# Patient Record
Sex: MALE | Race: WHITE | NOT HISPANIC OR LATINO | Employment: OTHER | URBAN - NONMETROPOLITAN AREA
[De-identification: names, ages, dates, MRNs, and addresses within clinical notes are randomized per-mention and may not be internally consistent; named-entity substitution may affect disease eponyms.]

---

## 2017-01-25 ENCOUNTER — HISTORY (OUTPATIENT)
Dept: SURGERY | Facility: OTHER | Age: 72
End: 2017-01-25

## 2017-01-25 ENCOUNTER — TRANSFERRED RECORDS (OUTPATIENT)
Dept: MULTI SPECIALTY CLINIC | Facility: CLINIC | Age: 72
End: 2017-01-25

## 2017-01-25 ENCOUNTER — SURGERY (OUTPATIENT)
Dept: SURGERY | Facility: OTHER | Age: 72
End: 2017-01-25

## 2017-01-25 ENCOUNTER — HOSPITAL ENCOUNTER (OUTPATIENT)
Dept: SURGERY | Facility: OTHER | Age: 72
Discharge: HOME | End: 2017-01-25
Attending: SURGERY | Admitting: SURGERY

## 2017-01-26 ENCOUNTER — COMMUNICATION - GICH (OUTPATIENT)
Dept: SURGERY | Facility: OTHER | Age: 72
End: 2017-01-26

## 2017-01-26 ENCOUNTER — HISTORY (OUTPATIENT)
Dept: SURGERY | Facility: OTHER | Age: 72
End: 2017-01-26

## 2017-07-10 ENCOUNTER — HISTORY (OUTPATIENT)
Dept: FAMILY MEDICINE | Facility: OTHER | Age: 72
End: 2017-07-10

## 2017-07-10 ENCOUNTER — OFFICE VISIT - GICH (OUTPATIENT)
Dept: FAMILY MEDICINE | Facility: OTHER | Age: 72
End: 2017-07-10

## 2017-07-10 DIAGNOSIS — H18.891 OTHER SPECIFIED DISORDERS OF CORNEA, RIGHT EYE: ICD-10-CM

## 2017-08-26 ENCOUNTER — COMMUNICATION - GICH (OUTPATIENT)
Dept: INTERNAL MEDICINE | Facility: OTHER | Age: 72
End: 2017-08-26

## 2017-08-26 DIAGNOSIS — I10 ESSENTIAL (PRIMARY) HYPERTENSION: ICD-10-CM

## 2017-08-31 ENCOUNTER — COMMUNICATION - GICH (OUTPATIENT)
Dept: INTERNAL MEDICINE | Facility: OTHER | Age: 72
End: 2017-08-31

## 2017-09-12 ENCOUNTER — OFFICE VISIT - GICH (OUTPATIENT)
Dept: INTERNAL MEDICINE | Facility: OTHER | Age: 72
End: 2017-09-12

## 2017-09-12 ENCOUNTER — HISTORY (OUTPATIENT)
Dept: INTERNAL MEDICINE | Facility: OTHER | Age: 72
End: 2017-09-12

## 2017-09-12 DIAGNOSIS — E78.5 HYPERLIPIDEMIA: ICD-10-CM

## 2017-09-12 DIAGNOSIS — Z23 ENCOUNTER FOR IMMUNIZATION: ICD-10-CM

## 2017-09-12 DIAGNOSIS — J45.20 MILD INTERMITTENT ASTHMA, UNCOMPLICATED: ICD-10-CM

## 2017-09-12 DIAGNOSIS — K21.9 GASTRO-ESOPHAGEAL REFLUX DISEASE WITHOUT ESOPHAGITIS: ICD-10-CM

## 2017-09-12 DIAGNOSIS — N13.8 OTHER OBSTRUCTIVE AND REFLUX UROPATHY: ICD-10-CM

## 2017-09-12 DIAGNOSIS — I10 ESSENTIAL (PRIMARY) HYPERTENSION: ICD-10-CM

## 2017-09-12 DIAGNOSIS — Z99.89 DEPENDENCE ON OTHER ENABLING MACHINES AND DEVICES: ICD-10-CM

## 2017-09-12 DIAGNOSIS — N40.1 ENLARGED PROSTATE WITH LOWER URINARY TRACT SYMPTOMS (LUTS): ICD-10-CM

## 2017-09-12 DIAGNOSIS — G47.33 OBSTRUCTIVE SLEEP APNEA: ICD-10-CM

## 2017-09-12 ASSESSMENT — PATIENT HEALTH QUESTIONNAIRE - PHQ9: SUM OF ALL RESPONSES TO PHQ QUESTIONS 1-9: 0

## 2017-11-17 ENCOUNTER — AMBULATORY - GICH (OUTPATIENT)
Dept: SCHEDULING | Facility: OTHER | Age: 72
End: 2017-11-17

## 2017-11-28 ENCOUNTER — AMBULATORY - GICH (OUTPATIENT)
Dept: SCHEDULING | Facility: OTHER | Age: 72
End: 2017-11-28

## 2017-12-18 ENCOUNTER — AMBULATORY - GICH (OUTPATIENT)
Dept: SCHEDULING | Facility: OTHER | Age: 72
End: 2017-12-18

## 2017-12-26 ENCOUNTER — AMBULATORY - GICH (OUTPATIENT)
Dept: SCHEDULING | Facility: OTHER | Age: 72
End: 2017-12-26

## 2017-12-28 NOTE — TELEPHONE ENCOUNTER
Patient Information     Patient Name MRJared Clay [id number] Male 1945      Telephone Encounter by Dahiana Alexis LPN at 2017  8:41 AM     Author:  Dahiana Alexis LPN Service:  (none) Author Type:  NURS- Licensed Practical Nurse     Filed:  2017  8:41 AM Encounter Date:  2017 Status:  Signed     :  Dahiana Alexis LPN (NURS- Licensed Practical Nurse)            Left a message for the patient to call back.  Dahiana Alexis LPN......2017  8:41 AM

## 2017-12-28 NOTE — TELEPHONE ENCOUNTER
Patient Information     Patient Name Jared Sewell [id number] Male 1945      Telephone Encounter by Poornima Paz at 2017 10:31 AM     Author:  Poornima Paz Service:  (none) Author Type:  (none)     Filed:  2017 10:31 AM Encounter Date:  2017 Status:  Signed     :  Poornima Paz            The patient has an appointment today with Dr Agrawal. He can address this issue then.  Poornima Paz LPN..................2017   10:31 AM

## 2017-12-28 NOTE — TELEPHONE ENCOUNTER
Patient Information     Patient Name Jared Sewell [id number] Male 1945      Telephone Encounter by Unique Balbuena at 2017  3:55 PM     Author:  Unique Balbuena Service:  (none) Author Type:  (none)     Filed:  2017  3:58 PM Encounter Date:  2017 Status:  Signed     :  Unique Balbuena            Patient is leaving [facility] for more than 90 days and wants to see if he can get more than a 90 day supply of meds.

## 2017-12-28 NOTE — PROGRESS NOTES
Patient Information     Patient Name MRN Sex Jared Khanna [id number] Male 1945      Progress Notes by Dia Guerrero NP at 7/10/2017  2:30 PM     Author:  Dia Guerrero NP Service:  (none) Author Type:  PHYS- Nurse Practitioner     Filed:  7/10/2017  4:50 PM Encounter Date:  7/10/2017 Status:  Signed     :  Dia Guerrero NP (PHYS- Nurse Practitioner)            HPI:    Jared Parry is a 71 y.o. male who presents to clinic today for eye concern.   States he was mowing the grass and there was dust and debris that got into his right eye.  Denies any eye trauma or penetrating injury.  Right eye with irritated feeling.  Denies eye pain.   Mild light sensitivity.  Denies vision loss or blurriness.  Mild watering.   Washed/rinsed it out at home with water.        Nursing Notes:   Ful-Juju ordered by Dia Guerrero NP.  Medication administered per order in [facility]   Lot # [id number]  Exp.   NDC 07444-646-85  Patient tolerated well.  Jackelyn Vanegas LPN..................7/10/2017  3:29 PM    Sodium Chloride Inhalation Solution ordered by Dia Guerrero NP.  Medication administered per order in [facility]   Lot # [id number]  Exp.   NDC 5363-8363-87  Patient tolerated well.  Jackelyn Vanegas LPN..................7/10/2017  3:30 PM      Tetracaine Hydrochloride ordered by Dia Guerrero NP.  Medication administered per order in [facility]   Lot # [id number]  Exp. 2018  NDC 6551-7608-05  Patient tolerated well.  Jackelyn Vanegas LPN..................7/10/2017  3:31 PM        Past Medical History:     Diagnosis  Date     ADD (attention deficit disorder with hyperactivity)      Adenomatous colon polyp     Next colonoscopy due 2016      Asthma      BPH (benign prostatic hyperplasia)      Hyperlipidemia      Hypertension      Hypertension      Nephrolithiasis      DUNIA on CPAP      Past Surgical History:      Procedure  Laterality Date     APPENDECTOMY       COLONOSCOPY SCREENING       INGUINAL  "HERNIA REPAIR BILATERAL       ORIF left femur       SC COLONOSCOPY BIOPSY SINGLE OR MULTIPLE  2017    tubular adenoma- due in  if indicated       TURP       Social History      Substance Use Topics        Smoking status:  Former Smoker     Types: Cigarettes     Quit date: 1983     Smokeless tobacco:  Former User     Quit date: 1986     Alcohol use  No     Current Outpatient Prescriptions       Medication  Sig Dispense Refill     losartan-hydrochlorothiazide, 50-12.5 mg, (HYZAAR) 50-12.5 mg tablet Take 1 tablet by mouth once daily. 90 tablet 3     No current facility-administered medications for this visit.      Medications have been reviewed by me and are current to the best of my knowledge and ability.    Allergies      Allergen   Reactions     Cephalexin  Hives     Sodium Pentothal [Thiopental]  *Unknown - Childhood Rxn     Patient states \"almost  from Pentothal as child\"        Past medical history, past surgical history, current medications and allergies reviewed and accurate to the best of my knowledge.        ROS:  Refer to HPI    /64  Pulse 68  Temp 97.5  F (36.4  C) (Tympanic)   Resp 18  Ht 1.702 m (5' 7\")  Wt 73.2 kg (161 lb 6.4 oz)  BMI 25.28 kg/m2    EXAM:  General Appearance: Well appearing elderly male, appropriate appearance for age. No acute distress  Head: normocephalic, atraumatic  Eyes: Right palpebral and bulbar conjunctivae with mild erythema and irritation.  Left conjunctivae normal.  Corneas clear bilaterally.  No conjunctival hemorrhage, hypopyon, or hyphema bilaterally.  No drainage or crusting noted bilaterally.  Patient able to look at lights without apparent light sensitivity or squinting.  PERRLA bilaterally.  EOMs intact.  No foreign body appreciated of right eye or beneath eyelids.  Fluorescein stain of right eye reveals no appreciated corneal abrasions.  Bilateral eyelids without swelling or erythema.    Respiratory: Normal effort.    Cardiac: RRR " with no murmurs  Musculoskeletal:  Normal gait.  Equal movement of bilateral upper extremities.  Equal movement of bilateral lower extremities.    Dermatological: no rashes or lesions noted of exposed skin  Psychological: normal affect, alert and pleasant        ASSESSMENT/PLAN:    ICD-10-CM    1. Corneal irritation of right eye H18.891 erythromycin ophthalmic ointment 0.5%        No foreign body appreciated of right eye or beneath eyelids.  Right eye flushed repeatedly with sterile saline without foreign body appreciated.  Fluorescein stain of right eye reveals no appreciated corneal abrasions.   Erythromycin eye ointment QID for 2-3 days for corneal irritation  May use lubricating eye drops PRN  Follow up if symptoms persist or worsen or concerns            There are no Patient Instructions on file for this visit.

## 2017-12-28 NOTE — PROGRESS NOTES
Patient Information     Patient Name MRN Jared Warren [id number] Male 1945      Progress Notes by Stevie Agrawal MD at 2017  3:00 PM     Author:  Stevie Agrawal MD Service:  (none) Author Type:  Physician     Filed:  2017  3:50 PM Encounter Date:  2017 Status:  Signed     :  Stevie Agrawal MD (Physician)            SUBJECTIVE:    Jared Parry is a 71 y.o. male who presents for comprehensive review of their multiple medical problems and review of medications, renewal of medications and update on necessary health maintenance issues.      HPI Comments: He is here today for complete evaluation. He is feeling well in most respects. He says that everything is great except for the fact that he is having a little bit of discomfort in his calf. He would like to have that checked.    Last year he had a colonoscopy showing a tubular adenoma as well as a sessile serrated adenoma. Both of these were very small. It was recommended that he consider follow-up colonoscopy after 5 years.    The patient was recently in [place] and had a physical done through his union. He had lab work done there. He apparently had a stress test done at some point as well. Apparently he passed that without difficulty.    He does have treated hypertension and is on single drug therapy. He has no known end organ disease as a result of this. He has mild hyperlipidemia that is not treated. He has some mild lower urinary tract symptoms but nothing too severe. He does have sleep apnea and wears his CPAP every evening. He has some very mild reflux disease, he takes over-the-counter Zantac and that seems to control his symptoms.    I spent time today updating his past medical history, past surgical history, family history and social history. Medications were reconciled. He is due for Prevnar, otherwise everything else is up-to-date.      Allergies      Allergen   Reactions     Cephalexin  Hives     Sodium Pentothal  "[Thiopental]  *Unknown - Childhood Rxn     Patient states \"almost  from Pentothal as child\"    ,   Current Outpatient Prescriptions     Medication  Sig     losartan-hydrochlorothiazide, 50-12.5 mg, (HYZAAR) 50-12.5 mg tablet TAKE 1 TABLET BY MOUTH EVERY DAY     Omega-3-DHA-EPA-Fish Oil (FISH OIL) 1,000 mg (120 mg-180 mg) cap Take 1 capsule by mouth once daily.     ranitidine (ZANTAC) 75 mg tablet Take 1 tablet by mouth once daily.     No current facility-administered medications for this visit.      Medications have been reviewed by me and are current to the best of my knowledge and ability. ,   Past Medical History:     Diagnosis  Date     ADD (attention deficit disorder with hyperactivity)      Adenomatous colon polyp     Next colonoscopy due       Asthma      BPH (benign prostatic hyperplasia)      Hyperlipidemia      Hypertension      Nephrolithiasis      DUNIA on CPAP    ,   Patient Active Problem List       Diagnosis  Date Noted     Hypertension       DUNIA on CPAP       ACP (advance care planning)  2013     BPH (benign prostatic hypertrophy) with urinary obstruction  2012     NEPHROLITHIASIS       GERD       CORONARY ARTERY DISEASE, FAMILY HX       COLONIC POLYPS, ADENOMATOUS, HX OF       ADD       ASTHMA, INTERMITTENT       probable          HYPERLIPIDEMIA, MILD     ,   Past Surgical History:      Procedure  Laterality Date     APPENDECTOMY       INGUINAL HERNIA REPAIR BILATERAL       KIDNEY STONE SURGERY  2016     ORIF left femur       PA COLONOSCOPY BIOPSY SINGLE OR MULTIPLE  2017    tubular adenoma- due in  if indicated       TURP      and   Social History      Substance Use Topics        Smoking status:  Former Smoker     Types: Cigarettes     Quit date: 1983     Smokeless tobacco:  Former User     Quit date: 1986     Alcohol use  No     Family Status     Relation  Status     Father  at age 56    MI      Mother  at age 82    Old age      Brother Alive " "    Brother Alive     Brother Alive     Sister      Sister Alive     Social History     Social History        Marital status:       Spouse name: N/A     Number of children:  N/A     Years of education:  N/A     Social History Main Topics        Smoking status:  Former Smoker     Types: Cigarettes     Quit date: 1983     Smokeless tobacco:  Former User     Quit date: 1986     Alcohol use  No     Drug use:  No     Sexual activity:  Not Asked     Other Topics  Concern     None      Social History Narrative     Samantha Wife, ~ with two daughters from a previous marriage and five grandchildren.  Retired as a .  Lives with his wife on [address] in [place].                           REVIEW OF SYSTEMS:  Review of Systems   Musculoskeletal: Positive for myalgias.   All other systems reviewed and are negative.      OBJECTIVE:  /68  Pulse 76  Ht 1.727 m (5' 8\")  Wt 73.8 kg (162 lb 12.8 oz)  BMI 24.75 kg/m2    EXAM:   Physical Exam   Constitutional: He is oriented to person, place, and time and well-developed, well-nourished, and in no distress. No distress.   HENT:   Head: Normocephalic and atraumatic.   Right Ear: Tympanic membrane and external ear normal.   Left Ear: Tympanic membrane and external ear normal.   Nose: Nose normal.   Mouth/Throat: Oropharynx is clear and moist and mucous membranes are normal. No oropharyngeal exudate.   Eyes: Conjunctivae are normal. Pupils are equal, round, and reactive to light. No scleral icterus.   Neck: Normal range of motion. Neck supple. Normal carotid pulses, no hepatojugular reflux and no JVD present. Carotid bruit is not present. No tracheal deviation and no edema present. No thyroid mass and no thyromegaly present.   Cardiovascular: Normal rate, regular rhythm, normal heart sounds and intact distal pulses.  Exam reveals no gallop and no friction rub.    No murmur heard.  Pulmonary/Chest: Effort normal and " breath sounds normal. No respiratory distress. He has no decreased breath sounds. He has no wheezes. He has no rhonchi. He has no rales. He exhibits no tenderness.   Abdominal: Soft. Bowel sounds are normal. He exhibits no distension and no mass. There is no hepatosplenomegaly. There is no tenderness. There is no rebound and no guarding. Hernia confirmed negative in the right inguinal area and confirmed negative in the left inguinal area.   Genitourinary: Rectum normal, prostate normal, testes/scrotum normal and penis normal.   Musculoskeletal: Normal range of motion. He exhibits no edema or tenderness.   Subjective pain in left calf.  No palpable abnormalities   Lymphadenopathy:     He has no cervical adenopathy.   Neurological: He is alert and oriented to person, place, and time. He has normal motor skills. He displays normal reflexes. No cranial nerve deficit. He exhibits normal muscle tone. Gait normal. Coordination normal.   Skin: Skin is warm and dry. No rash noted. He is not diaphoretic. No cyanosis or erythema. No pallor. Nails show no clubbing.   Psychiatric: Mood, memory, affect and judgment normal.   Nursing note and vitals reviewed.      ASSESSMENT/PLAN:    ICD-10-CM    1. Mild intermittent asthma without complication J45.20 OMNI PREVNAR 13 (AKA PNEUMOCOCCAL VACCINE 13-VALENT IM)   2. Hyperlipidemia, unspecified hyperlipidemia type E78.5    3. DUNIA on CPAP G47.33      Z99.89    4. Hypertension I10 losartan-hydrochlorothiazide, 50-12.5 mg, (HYZAAR) 50-12.5 mg tablet   5. BPH (benign prostatic hypertrophy) with urinary obstruction N40.1      N13.8    6. Gastroesophageal reflux disease, esophagitis presence not specified K21.9    7. Encounter for immunization  Z23 OMNI PREVNAR 13 (AKA PNEUMOCOCCAL VACCINE 13-VALENT IM)        Plan: He really appears to be healthy at this point in time. Reassured regarding his calf discomfort, I really don't find anything there that I would be concerned about. He probably  just has a strain of that muscle. Return if this doesn't improve. Medications will remain the same. Prevnar given today. He will come in at some point and drop off his lab work that he had recently in [place], therefore lab was not done today. Assuming all goes well he will follow-up on an annual basis or anytime sooner if there are problems.

## 2017-12-28 NOTE — TELEPHONE ENCOUNTER
Patient Information     Patient Name MRJared Clay [id number] Male 1945      Telephone Encounter by Dahiana Alexis LPN at 2017 10:10 AM     Author:  Dahiana Alexis LPN Service:  (none) Author Type:  NURS- Licensed Practical Nurse     Filed:  2017 10:10 AM Encounter Date:  2017 Status:  Signed     :  Dahiana Alexis LPN (NURS- Licensed Practical Nurse)            Left a message for the patient to call back.  Dahiana Alexis LPN......2017  10:10 AM

## 2017-12-28 NOTE — TELEPHONE ENCOUNTER
Patient Information     Patient Name MRJared Clay [id number] Male 1945      Telephone Encounter by Dahiana Alexis LPN at 2017  3:45 PM     Author:  Dahiana Alexis LPN Service:  (none) Author Type:  NURS- Licensed Practical Nurse     Filed:  2017  3:45 PM Encounter Date:  2017 Status:  Signed     :  Dahiana Alexis LPN (NURS- Licensed Practical Nurse)            Left a message for the patient to call back.  Dahiana Alexis LPN......2017  3:45 PM

## 2017-12-28 NOTE — TELEPHONE ENCOUNTER
Patient Information     Patient Name MRN Jared Warren [id number] Male 1945      Telephone Encounter by Renetta Powers RN at 2017  4:38 PM     Author:  Renetta Powers RN Service:  (none) Author Type:  NURS- Registered Nurse     Filed:  2017  4:41 PM Encounter Date:  2017 Status:  Signed     :  Renetta Powers RN (NURS- Registered Nurse)            Diuretic Combinations    Office visit in the past 12 months or per provider note.    Last visit with DONNIE GARCIA was on: 2016 in [facility]  Next visit with DONNIE GARCIA is on: No future appointment listed with this provider  Next visit with Internal Medicine is on: No future appointment listed in this department    Lab test requirements:  Creatinine and Potassium annually, if ordering lab, order BMP.  CREATININE (mg/dL)    Date Value   2016 0.95     POTASSIUM (mmol/L)    Date Value   2016 4.2       Max refill for 12 months from last office visit or per provider note.  BP Readings from Last 4 Encounters:    07/10/17 112/64   17 121/85   16 118/70   08/10/16 156/82         Patient is due for medication management appointment. Limited refill provided at this time. [employer] message and/or letter sent for reminder to patient. Prescription refilled per RN Medication Refill Policy.................... Renetta Powers RN ....................  2017   4:40 PM

## 2017-12-28 NOTE — TELEPHONE ENCOUNTER
Patient Information     Patient Name Jared Sewell [id number] Male 1945      Telephone Encounter by Poornima Paz at 2017 10:08 AM     Author:  Poornima Paz Service:  (none) Author Type:  (none)     Filed:  2017 10:08 AM Encounter Date:  2017 Status:  Signed     :  Poornima Paz            Left a message to call back with his wife.  Poornima Paz LPN..................2017   10:08 AM

## 2017-12-30 NOTE — NURSING NOTE
Patient Information     Patient Name MRN Jared Warren [id number] Male 1945      Nursing Note by Jackelyn Vanegas at 7/10/2017  2:30 PM     Author:  Jackelyn Vanegas  Service:  (none) Author Type:  (none)     Filed:  7/10/2017  3:32 PM  Encounter Date:  7/10/2017 Status:  Addendum     :  Jackelyn Vanegas        Related Notes: Original Note by Jackelyn Vanegas filed at 7/10/2017  3:30 PM            Patient presents to clinic with right eye pain. He was mowing the grass and dust blew up and his eye became irritated.  Jackelyn FinneganN ....................  7/10/2017   2:51 PM    Ful-Juju ordered by Dia Guerrero NP.  Medication administered per order in [facility]   Lot # [id number]  Exp.   NDC 49106-922-23  Patient tolerated well.  Jackelyn Vanegas LPN..................7/10/2017  3:29 PM    Sodium Chloride Inhalation Solution ordered by Dia Guerrero NP.  Medication administered per order in [facility]   Lot # [id number]  Exp.   NDC 7366-7581-25  Patient tolerated well.  Jackelyn Vanegas LPN..................7/10/2017  3:30 PM      Tetracaine Hydrochloride ordered by Dia Guerrero NP.  Medication administered per order in [facility]   Lot # [id number]  Exp. 2018  NDC 1309-0851-03  Patient tolerated well.  Jackelyn Vanegas LPN..................7/10/2017  3:31 PM

## 2018-01-02 ENCOUNTER — AMBULATORY - GICH (OUTPATIENT)
Dept: SCHEDULING | Facility: OTHER | Age: 73
End: 2018-01-02

## 2018-01-03 NOTE — H&P
"Patient Information     Patient Name MRN Jared Warren [id number] Male 1945      H&P by Marianne Nguyễn MD at 2017  8:30 AM     Author:  Marianne Nguyễn MD Service:  (none) Author Type:  Physician     Filed:  2017  8:31 AM Date of Service:  2017  8:30 AM Status:  Signed     :  Marianne Nguyễn MD (Physician)            PRE-PROCEDURE NOTE    CHIEF COMPLAINT / REASON FOR PROCEDURE:  Need for screening colonoscopy.    PERTINENT HISTORY   Patient with no complaints. Previous colonoscopy -no polyps but had polyps in . No diarrhea, constipation, abdominal pain or rectal bleeding. No family history of colon polyps or colon cancer.    Past Medical History      Diagnosis   Date     ADD (attention deficit disorder with hyperactivity)       Adenomatous colon polyp       Next colonoscopy due 2016      Asthma       BPH (benign prostatic hyperplasia)       Hyperlipidemia       Hypertension       Hypertension       Nephrolithiasis       DUNIA on CPAP       Past Surgical History      Procedure  Laterality Date     Appendectomy       Orif left femur       Inguinal hernia repair bilateral       Colonoscopy screening       Turp       Other:  None  Bleeding tendencies:  No    Relevant Family History:  None    Relevant Social History:  None    A relevant review of systems was performed and was negative.    ALLERGIES/SENSITIVITIES:   Allergies      Allergen   Reactions     Cephalexin  Hives     Sodium Pentothal [Thiopental]  *Unknown - Childhood Rxn     Patient states \"almost  from Pentothal as child\"         CURRENT MEDICATIONS:    Current Facility-Administered Medications        Medication  Dose Route Frequency Last Rate     lactated ringers infusion  25 mL/hr Intravenous continuous       lidocaine (1%) injection 0.1-1 mL  0.1-1 mL Intra-Dermal one time prn       sodium chloride 0.9% 5 mL syringe (NORMAL SALINE)  5 mL Intravenous Each Time PRN        Prior to Admission medications        "   Medication Sig Start Date End Date Taking? Last Dose Authorizing Provider   losartan-hydrochlorothiazide, 50-12.5 mg, (HYZAAR) 50-12.5 mg tablet Take 1 tablet by mouth once daily. 8/31/16 1/25/2017 at Unknown time Stevie Agrawal MD       PRE-SEDATION ASSESSMENT:    Lung Exam:  Normal  Heart Exam:  Normal    Comment(s):      IMPRESSION:  Need for screening colonoscopy.    PLAN:  I discussed screening colonoscopy with the patient. Anesthesia coverage requested due to age more than 71..    Marianne Nguyễn MD

## 2018-01-03 NOTE — PROCEDURES
Patient Information     Patient Name MRN Jared Warren [id number] Male 1945      Procedures by Marianne Nguyễn MD at 2017  9:17 AM     Author:  Marianne Nguyễn MD Service:  (none) Author Type:  Physician     Filed:  2017  9:21 AM Date of Service:  2017  9:17 AM Status:  Signed     :  Marianne Nguyễn MD (Physician)        Pre-procedure Diagnoses:    1. Special screening for malignant neoplasms, colon [Z12.11]           Post-procedure Diagnoses:    1. Colon polyps [K63.5]    2. Diverticulosis of colon without diverticulitis [K57.30]           Procedures:    1. NH COLONOSCOPY BIOPSY SINGLE OR MULTIPLE [89809.0]               PROCEDURE NOTE    SURGEON: Marianne Nguyễn MD.    PRE-OP DIAGNOSIS:  Screening Colonoscopy      POST-OP DIAGNOSIS: colon polyps transverse and sigmoid colon, diverticula of colon    PROCEDURE:  Colonoscopy with polypectomies-cold forceps    ESTIMATED BLOOD LOSS: none    COMPLICATIONS:  None    SPECIMEN:  Transverse and sigmoid polyps    ANESTHESIA:  See anesthesia note, anesthesia requested due to:age more than 71    INDICATION FOR THE PROCEDURE: The patient is a 71 y.o. male. The patient has no complaints. I explained to the patient the risks, benefits and alternatives to screening colonoscopy for evaluating the colon for colon polyps and colon cancer. We specifically discussed the risks of bleeding, infection, perforation, potential inability to reach the cecum and the risks of sedation. The patient's questions were answered and the patient wished to proceed. Informed consent paperwork was completed.    PROCEDURE: The patient was taken to the endoscopy suite. Appropriate monitors were attached. The patient was placed in the left lateral decubitus position.Timeout was performed confirming the patient's identity and procedure to be performed.  After appropriate sedation was confirmed, digital rectal exam was performed.  There was normal tone and no gross  abnormality was noted. The lubricated colonoscope was introduced into the anus the colon was insufflated with air. The prep quality was adequate. Under direct visualization the scope was advanced to the cecum. The terminal ileum was inspected. No gross abnormality was noted. The scope was withdrawn back into the cecum. The mucosa of colon was inspected while withdrawing the scope. A small sessile polyp was noted in the transverse colon and removed with cold forceps. A tiny sessile polyp was noted in the distal sigmoid colon and removed with cold forceps. The scope was retroflexed in the rectum and the anorectal junction was inspected. No abnormalities were noted other than internal hemorrhoids. The scope was returned to a neutral position and the colon was decompressed. The scope was removed. The patient tolerated the procedure with no immediately apparent complication. The patient was taken to recovery in stable condition.    FOLLOW UP:  RECOMMEND high fiber diet, follow up: will call with pathology results.    Marianne Nguyễn MD

## 2018-01-03 NOTE — OR ANESTHESIA
Patient Information     Patient Name MRN Sex     Jared Parry [id number] Male 1945      OR Anesthesia by Manolo Bajwa CRNA at 2017 10:01 AM     Author:  Manolo Bajwa CRNA Service:  (none) Author Type:  NURS- Nurse Anesthetist     Filed:  2017 10:01 AM Date of Service:  2017 10:01 AM Status:  Signed     :  Manolo Bajwa CRNA (NURS- Nurse Anesthetist)            Anesthesia Post Operative Care Note    Name: Jared Parry  MRN:   [id number]  :    1945       Procedure Done:  See Surgeon Note   Case Cancelled for Anesthetic Reason:  No      Anesthesia Technique    Anesthetic Type:  MAC       MAC Type:  NC     Oral Trauma:  No    Intraoperative Course   Hemodynamics:  Stable    Ventilation Normal:  Yes Lung Sounds:  Normal      PACU Course        Nondepolarizer Used:       Reversed: N/A   Hemodynamics:  Stable      Hydration: Euvolemic   Temperature:  36.1 - 38.3      Mental Status:  Awake, alert, follows commands   Pain Management:  Adequate   Regional Block:  No   Anesthesia Complications:  None      Vital Signs:  Temp: 97.1  F (36.2  C)  Pulse: 67  BP: 121/85  Resp: 14  SpO2: 98 %    O2 Device: Room Air                  Active Lines:  Patient Lines/Drains/Airways Status    Active Line     Name: Placement date: Placement time: Site: Days:    PERIPHERAL VAD Right Hand 17   Hand   less than 1                Intake & Output:  Date  17 07 - 17 0659(Not Admitted)    17 07 - 17 0659      Shift  0549-4737 6633-5573 9274-1013 24 Hour Total 5296-6931 1480-5502 6225-3026 24 Hour Total   I  N  T  A  K  E   Shift Total           O  U  T  P  U  T   Other     200   200       +I/O+    Liquid Stool     200   200    Shift Total     200   200   NET      -200   -200   Weight (kg)  73.5 73.5 73.5 73.5 73.5 73.5 73.5 73.5         Labs:  No results for input(s): UB2NZVKOFMJ, HCK5BYMEPSGW, PHARTERIAL, BSZ0GCKUMUUL, H4OKJGOZITAJ in the  last 24 hours.    No results for input(s): MAGNESIUM in the last 24 hours.    No results for input(s): GLUCOSEMETER in the last 720 hours.        Manolo Bajwa CRNA ....................  1/25/2017   10:01 AM

## 2018-01-03 NOTE — OR NURSING
Patient Information     Patient Name MRN Jared Warren [id number] Male 1945      OR Nursing by Arlyn Ch RN at 2017  8:37 AM     Author:  Arlyn Ch RN Service:  (none) Author Type:  NURS- Registered Nurse     Filed:  2017  8:37 AM Date of Service:  2017  8:37 AM Status:  Signed     :  Arlyn Ch RN (NURS- Registered Nurse)            Received preop report from Patricia Stephens RN.

## 2018-01-03 NOTE — OR ANESTHESIA
"Patient Information     Patient Name MRN Sex Jared Khanna [id number] Male 1945      OR Anesthesia by Manolo Bajwa CRNA at 2017  8:21 AM     Author:  Manolo Bajwa CRNA Service:  (none) Author Type:  NURS- Nurse Anesthetist     Filed:  2017  8:21 AM Date of Service:  2017  8:21 AM Status:  Signed     :  Manolo Bajwa CRNA (NURS- Nurse Anesthetist)            ANESTHESIAPREOP      PREANESTHETIC EXAM    Jared Parry is a 71 y.o. male    Visit Vitals       /78     Pulse 58     Temp 97.4  F (36.3  C)     Resp 14     Ht 1.727 m (5' 8\")     Wt 73.5 kg (162 lb)     SpO2 97%     BMI 24.63 kg/m2     Body mass index is 24.63 kg/(m^2).    ALLERGIES    Cephalexin and Sodium pentothal [thiopental]      PAST MEDICAL HISTORY    Past Medical History      Diagnosis   Date     ADD (attention deficit disorder with hyperactivity)       Adenomatous colon polyp       Next colonoscopy due 2016      Asthma       BPH (benign prostatic hyperplasia)       Hyperlipidemia       Hypertension       Hypertension       Nephrolithiasis       DUNIA on CPAP         Patient Active Problem List     Diagnosis  Code     GERD K21.9     CORONARY ARTERY DISEASE, FAMILY HX Z82.49     COLONIC POLYPS, ADENOMATOUS, HX OF Z86.010     ADD F98.8     ASTHMA, INTERMITTENT J45.909     HYPERLIPIDEMIA, MILD E78.5     NEPHROLITHIASIS N20.0     BPH (benign prostatic hypertrophy) with urinary obstruction N40.1, N13.8     ACP (advance care planning) Z71.89     DUNIA on CPAP G47.33     Hypertension I10     Special screening for malignant neoplasms, colon Z12.11       Family History       Problem   Relation Age of Onset     Diabetes  Mother       at age 82       Heart Disease  Mother      \"enlarged heart.\"        Heart Disease  Father       at age 56 of an MI       Hypertension  Father      Hyperlipidemia  Father      hyperlipidemia       Alcohol/Drug  Father      alcohol use       Hypertension  " Brother      Heart Disease  Brother      CAD starting in his late 50s       Hyperlipidemia  Brother      hyperlipidemia       Other  Brother      obesity       Diabetes  Brother      type 2       Hypertension  Brother      Heart Disease  Brother      CAD starting in his late 50s       Hyperlipidemia  Brother      Other  Brother      obesity       Diabetes  Brother      type 2       Hypertension  Brother      Heart Disease  Brother      Other  Sister       in her mid 60's.  Viral cardiomyopathy,       Diabetes  Sister      Arthritis  Sister      Hypertension  Sister      Hyperlipidemia  Sister       possible hyperlipidemia         Past Surgical History      Procedure  Laterality Date     Appendectomy       Orif left femur       Inguinal hernia repair bilateral       Colonoscopy screening       Turp         Major Anesthetic Reactions: none    PMH/PSH Reviewed      History     Smoking Status       Former Smoker      Types: Cigarettes     Quit date: 1983   Smokeless Tobacco       Former User      Quit date: 1986     History    Alcohol Use No       Medications have been reviewed in coordination with proposed intra-procedure medications.    Prescriptions Prior to Admission       Medication  Sig Dispense Refill     losartan-hydrochlorothiazide, 50-12.5 mg, (HYZAAR) 50-12.5 mg tablet Take 1 tablet by mouth once daily. 90 tablet 3       Recent Labs  No results found for this visit on 17.    NPO Status Noted:  Yes    Airway Class:  2 Short TMD    ASA Physical Status: 2    ANESTHETIC PLAN    Anesthetic Plan: Mac    The risks, benefits, and alternatives of the procedure were discussed.    Postop Note: Please see the chart for the postop note.    Manolo Bajwa CRNA ....................  2017   8:21 AM

## 2018-01-03 NOTE — OR POSTOP
Patient Information     Patient Name MRN Sex Jared Khanna [id number] Male 1945      OR PostOp by Carolyn Lomeli RN at 2017 10:09 AM     Author:  Carolyn Lomeli RN Service:  (none) Author Type:  NURS- Registered Nurse     Filed:  2017 10:09 AM Date of Service:  2017 10:09 AM Status:  Signed     :  Carolyn Lomeli RN (NURS- Registered Nurse)            Discharge Note    Data:  Jared Parry has been discharged home at 1002 via ambulatory accompanied by Registered Nurse.      Action:  Written discharge/follow-up instructions were provided to patient and Spouse. Prescriptions : None.  Belongings sent with patient. Medications from home sent with patient/family: Not Applicable  Equipment none .     Response:  Patient and Spouse verbalized understanding of discharge instructions, reason for discharge, and necessary follow-up appointments.

## 2018-01-10 ENCOUNTER — OFFICE VISIT - GICH (OUTPATIENT)
Dept: INTERNAL MEDICINE | Facility: OTHER | Age: 73
End: 2018-01-10

## 2018-01-10 ENCOUNTER — HISTORY (OUTPATIENT)
Dept: INTERNAL MEDICINE | Facility: OTHER | Age: 73
End: 2018-01-10

## 2018-01-10 DIAGNOSIS — E78.5 HYPERLIPIDEMIA: ICD-10-CM

## 2018-01-10 DIAGNOSIS — I74.3 EMBOLISM AND THROMBOSIS OF ARTERY OF LOWER EXTREMITY (H): ICD-10-CM

## 2018-01-10 DIAGNOSIS — I10 ESSENTIAL (PRIMARY) HYPERTENSION: ICD-10-CM

## 2018-01-10 ASSESSMENT — PATIENT HEALTH QUESTIONNAIRE - PHQ9: SUM OF ALL RESPONSES TO PHQ QUESTIONS 1-9: 0

## 2018-01-25 ENCOUNTER — DOCUMENTATION ONLY (OUTPATIENT)
Dept: FAMILY MEDICINE | Facility: OTHER | Age: 73
End: 2018-01-25

## 2018-01-25 PROBLEM — I74.3: Status: ACTIVE | Noted: 2017-01-01

## 2018-01-25 PROBLEM — E78.5 HYPERLIPIDEMIA, MILD: Status: ACTIVE | Noted: 2018-01-25

## 2018-01-25 PROBLEM — F98.8 ATTENTION DEFICIT DISORDER: Status: ACTIVE | Noted: 2018-01-25

## 2018-01-25 PROBLEM — G47.33 OSA ON CPAP: Status: ACTIVE | Noted: 2018-01-25

## 2018-01-25 PROBLEM — I10 HYPERTENSION: Status: ACTIVE | Noted: 2018-01-25

## 2018-01-25 PROBLEM — J45.909 ASTHMA: Status: ACTIVE | Noted: 2018-01-25

## 2018-01-25 PROBLEM — Z86.0100 HISTORY OF COLONIC POLYPS: Status: ACTIVE | Noted: 2018-01-25

## 2018-01-25 PROBLEM — K21.9 ESOPHAGEAL REFLUX: Status: ACTIVE | Noted: 2018-01-25

## 2018-01-25 PROBLEM — N20.0 NEPHROLITHIASIS: Status: ACTIVE | Noted: 2018-01-25

## 2018-01-25 PROBLEM — Z82.49 FAMILY HISTORY OF ISCHEMIC HEART DISEASE: Status: ACTIVE | Noted: 2018-01-25

## 2018-01-25 RX ORDER — LOSARTAN POTASSIUM AND HYDROCHLOROTHIAZIDE 12.5; 5 MG/1; MG/1
1 TABLET ORAL DAILY
COMMUNITY
Start: 2017-09-12 | End: 2018-08-20

## 2018-01-25 RX ORDER — FERROUS SULFATE 325(65) MG
1 TABLET, DELAYED RELEASE (ENTERIC COATED) ORAL DAILY
COMMUNITY
Start: 2018-01-10 | End: 2018-05-29

## 2018-01-25 RX ORDER — ATORVASTATIN CALCIUM 20 MG/1
1 TABLET, FILM COATED ORAL DAILY
COMMUNITY
Start: 2018-01-10 | End: 2019-06-26

## 2018-01-28 VITALS
DIASTOLIC BLOOD PRESSURE: 68 MMHG | BODY MASS INDEX: 24.67 KG/M2 | WEIGHT: 162.8 LBS | HEIGHT: 68 IN | HEART RATE: 76 BPM | SYSTOLIC BLOOD PRESSURE: 102 MMHG

## 2018-01-28 VITALS
SYSTOLIC BLOOD PRESSURE: 112 MMHG | DIASTOLIC BLOOD PRESSURE: 64 MMHG | WEIGHT: 161.4 LBS | RESPIRATION RATE: 18 BRPM | BODY MASS INDEX: 25.33 KG/M2 | TEMPERATURE: 97.5 F | HEART RATE: 68 BPM | HEIGHT: 67 IN

## 2018-02-03 ASSESSMENT — PATIENT HEALTH QUESTIONNAIRE - PHQ9: SUM OF ALL RESPONSES TO PHQ QUESTIONS 1-9: 0

## 2018-02-09 VITALS
WEIGHT: 167.6 LBS | BODY MASS INDEX: 25.48 KG/M2 | DIASTOLIC BLOOD PRESSURE: 78 MMHG | SYSTOLIC BLOOD PRESSURE: 122 MMHG | HEART RATE: 76 BPM

## 2018-02-10 ASSESSMENT — PATIENT HEALTH QUESTIONNAIRE - PHQ9: SUM OF ALL RESPONSES TO PHQ QUESTIONS 1-9: 0

## 2018-02-12 NOTE — PROGRESS NOTES
Patient Information     Patient Name MRN Jared Warren [id number] Male 1945      Progress Notes by Stevie Agrawal MD at 1/10/2018  3:20 PM     Author:  Stevie Agrawal MD Service:  (none) Author Type:  Physician     Filed:  1/10/2018  3:47 PM Encounter Date:  1/10/2018 Status:  Signed     :  Stevie Agrawal MD (Physician)            SUBJECTIVE:    Jared Parry is a 72 y.o. male who presents for f/u on medical issues.    HPI Comments: He is here for follow-up. He had a popliteal embolectomy done in [place]. The source of this was unclear but it was felt to be possibly cardiac. He was placed on Xarelto which she is currently on and tolerates without difficulty. For secondary prevention, they recommended an antiplatelet. He does not tolerate aspirin or Plavix because of GI upset so he is no longer on either of these. He was started on moderate intensity statin therapy and is taking atorvastatin 20 mg daily. His cholesterol last fall in [place] was 220, most recently it was 160 in [place]. He is tolerating this without difficulty.    They did fairly extensive evaluation on him while he was there so nothing else really needs to be done today. He was a little bit anemic initially and he is taking some iron and is just finishing that off. He was having a little bit of diarrhea and the iron has helped with that so he is happy to take the iron for the time being. His last hemoglobin was 14.5 so I told him he could certainly go off of that whenever he so desired.    He is having minimal leg symptoms. He has an occasional little ache in the calf and some numbness in his toe on occasion but otherwise feels well. It's interesting because he was having actual claudication in the left calf prior to the embolectomy. His history sounded more like progressive vascular obstruction from atherosclerosis rather than an acute embolic event.      Allergies      Allergen   Reactions     Aspirin  GI Upset      "Cephalexin  Hives     Sodium Pentothal [Thiopental]  *Unknown - Childhood Rxn     Patient states \"almost  from Pentothal as child\"    ,   Current Outpatient Prescriptions     Medication  Sig     atorvastatin (LIPITOR) 20 mg tablet Take 1 tablet by mouth once daily.     clopidogrel (PLAVIX) 75 mg tablet Take 1 tablet by mouth once daily.     ferrous sulfate 325 mg delayed release tablet Take 1 tablet by mouth once daily.     losartan-hydrochlorothiazide, 50-12.5 mg, (HYZAAR) 50-12.5 mg tablet Take 1 tablet by mouth once daily.     Omega-3-DHA-EPA-Fish Oil (FISH OIL) 1,000 mg (120 mg-180 mg) cap Take 1 capsule by mouth once daily.     ranitidine (ZANTAC) 150 mg tablet Take 1 tablet by mouth 2 times daily.     rivaroxaban (XARELTO) 20 mg tablet Take 1 tablet by mouth once daily with evening meal.     No current facility-administered medications for this visit.      Medications have been reviewed by me and are current to the best of my knowledge and ability. ,   Past Medical History:     Diagnosis  Date     ADD (attention deficit disorder with hyperactivity)      Adenomatous colon polyp     Next colonoscopy due 2016      Asthma      BPH (benign prostatic hyperplasia)      Hyperlipidemia      Hypertension      Nephrolithiasis      DUNIA on CPAP      Popliteal artery embolism, left (HC) 2017   ,   Patient Active Problem List       Diagnosis  Date Noted     Popliteal artery embolism, left (HC)  2017     Hypertension       DUNIA on CPAP       ACP (advance care planning)  2013     NEPHROLITHIASIS       GERD       CORONARY ARTERY DISEASE, FAMILY HX       COLONIC POLYPS, ADENOMATOUS, HX OF       ADD       ASTHMA, INTERMITTENT       probable          HYPERLIPIDEMIA, MILD     ,   Past Surgical History:      Procedure  Laterality Date     APPENDECTOMY       EMBOLECTOMY Bilateral 2017    Popliteal       INGUINAL HERNIA REPAIR BILATERAL       KIDNEY STONE SURGERY  2016     ORIF left femur       [facility] COLONOSCOPY BIOPSY " SINGLE OR MULTIPLE  01/25/2017    tubular adenoma- due in 2022 if indicated       TURP      and   Social History      Substance Use Topics        Smoking status:  Former Smoker     Types: Cigarettes     Quit date: 9/11/1983     Smokeless tobacco:  Former User     Quit date: 9/11/1986     Alcohol use  No       REVIEW OF SYSTEMS:  Review of Systems   All other systems reviewed and are negative.      OBJECTIVE:  /78 (Cuff Site: Right Arm, Position: Sitting, Cuff Size: Adult Regular)  Pulse 76  Wt 76 kg (167 lb 9.6 oz)  BMI 25.48 kg/m2    EXAM:   Physical Exam   Constitutional: He is well-developed, well-nourished, and in no distress. No distress.   Cardiovascular: Normal rate, regular rhythm and normal heart sounds.  Exam reveals no gallop and no friction rub.    No murmur heard.  Pulmonary/Chest: Effort normal and breath sounds normal. No respiratory distress. He has no wheezes. He has no rales.   Skin: He is not diaphoretic.   Nursing note and vitals reviewed.      ASSESSMENT/PLAN:    ICD-10-CM    1. Hypertension I10    2. 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 Hyperlipidemia, unspecified hyperlipidemia type E78.5         Plan:  He appears to be stable at this time. He is not able to take any antiplatelets because of GI distress. He will continue on Lipitor and continue on Xarelto. He was wondering about alternative anticoagulants due to the cost of the Xarelto. I reviewed warfarin with him, he is not interested in having his levels monitored so he will continue as is. Currently appears to be stable and will follow-up on an as-needed basis.

## 2018-02-12 NOTE — NURSING NOTE
Patient Information     Patient Name MRJared Clay [id number] Male 1945      Nursing Note by Dahiana Alexis LPN at 1/10/2018  3:20 PM     Author:  Dahiana Alexis LPN Service:  (none) Author Type:  NURS- Licensed Practical Nurse     Filed:  1/10/2018  3:21 PM Encounter Date:  1/10/2018 Status:  Signed     :  Dahiana Alexis LPN (NURS- Licensed Practical Nurse)            The patient is here today to have a hospital follow up.  Dahiana Alexis LPN......1/10/2018  3:17 PM

## 2018-03-14 ENCOUNTER — TRANSFERRED RECORDS (OUTPATIENT)
Dept: HEALTH INFORMATION MANAGEMENT | Facility: OTHER | Age: 73
End: 2018-03-14

## 2018-05-18 ENCOUNTER — OFFICE VISIT (OUTPATIENT)
Dept: FAMILY MEDICINE | Facility: OTHER | Age: 73
End: 2018-05-18
Attending: NURSE PRACTITIONER
Payer: COMMERCIAL

## 2018-05-18 VITALS
WEIGHT: 165.38 LBS | HEART RATE: 76 BPM | SYSTOLIC BLOOD PRESSURE: 130 MMHG | DIASTOLIC BLOOD PRESSURE: 80 MMHG | BODY MASS INDEX: 25.15 KG/M2

## 2018-05-18 DIAGNOSIS — J01.10 ACUTE NON-RECURRENT FRONTAL SINUSITIS: Primary | ICD-10-CM

## 2018-05-18 PROCEDURE — G0463 HOSPITAL OUTPT CLINIC VISIT: HCPCS

## 2018-05-18 PROCEDURE — 99213 OFFICE O/P EST LOW 20 MIN: CPT | Performed by: NURSE PRACTITIONER

## 2018-05-18 ASSESSMENT — PAIN SCALES - GENERAL: PAINLEVEL: MILD PAIN (3)

## 2018-05-18 NOTE — NURSING NOTE
Patient presents to clinic today for a headache. He is unsure if it is a sinus headache. He states it has been going on for 2-2 1/2 months.     Destiny Velasquez LPN...................5/18/2018  2:54 PM

## 2018-05-18 NOTE — PROGRESS NOTES
HPI:    Jared Parry is a 72 year old male who presents to clinic today for headaches. Started while in [place] about 2 months ago. Has had influenza x2. He has had ongoing intermittent headaches. Headache is in his forehead. No trouble sleeping at night. Headaches start around noon if he bends over, coughs, sneezes. Continuous headaches until he sleeps. He has been taking tylenol for headaches with minimal relief. He does have ongoing sinus sx including runny nose, sinus drainage, PND. Minimal coughing. No fevers. Does not smoke. On Xaralto, saw this may cause headaches so stopped this for 3 weeks, no change so restarted this medication. No hx of headaches.     Past Medical History:   Diagnosis Date     Attention-deficit hyperactivity disorder     No Comments Provided     Benign neoplasm of colon     Next colonoscopy due 2016     Calculus of kidney     No Comments Provided     Dependence on other enabling machines and devices     No Comments Provided     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 (H)     2017     Enlarged prostate without lower urinary tract symptoms (luts)     No Comments Provided     Essential (primary) hypertension     No Comments Provided     Hyperlipidemia     No Comments Provided     Uncomplicated asthma     No Comments Provided       Past Surgical History:   Procedure Laterality Date     APPENDECTOMY OPEN      No Comments Provided     CYSTOSCOPY, TRANSURETHRAL RESECTION (TUR) PROSTATE, COMBINED      No Comments Provided     HERNIORRHAPHY INGUINAL BILATERAL      No Comments Provided     OTHER SURGICAL HISTORY      [id number],OTHER     OTHER SURGICAL HISTORY      01/25/2017,96946.0,[facility] COLONOSCOPY BIOPSY SINGLE OR MULTIPLE,tubular adenoma- due in 2022 if indicated     OTHER SURGICAL HISTORY      2016,[id number],KIDNEY STONE SURGERY     OTHER SURGICAL HISTORY      2017,,EMBOLECTOMY,Bilateral,Popliteal       Family History   Problem Relation Age of Onset     HEART DISEASE Father       "Heart Disease, at age 56 of an MI     Hypertension Father      Hypertension     Hyperlipidemia Father      Hyperlipidemia     Substance Abuse Father      Alcohol/Drug,alcohol use     DIABETES Mother      Diabetes, at age 82     HEART DISEASE Mother      Heart Disease,\"enlarged heart.\"     Hypertension Brother      Hypertension     HEART DISEASE Brother      Heart Disease,CAD starting in his late 50s     Hyperlipidemia Brother      Hyperlipidemia,hyperlipidemia     Other - See Comments Brother      obesity     DIABETES Brother      Diabetes,type 2     Hypertension Brother      Hypertension     HEART DISEASE Brother      Heart Disease,CAD starting in his late 50s     Hyperlipidemia Brother      Hyperlipidemia     Other - See Comments Brother      obesity     DIABETES Brother      Diabetes,type 2     Hypertension Brother      Hypertension     HEART DISEASE Brother      Heart Disease     Prostate Cancer Brother      Cancer-prostate     Other - See Comments Sister       in her mid 60's.  Viral cardiomyopathy,     DIABETES Sister      Diabetes     Arthritis Sister      Arthritis     Hypertension Sister      Hypertension     Hyperlipidemia Sister      Hyperlipidemia, possible hyperlipidemia       Social History     Social History     Marital status:      Spouse name: N/A     Number of children: N/A     Years of education: N/A     Occupational History     Not on file.     Social History Main Topics     Smoking status: Former Smoker     Types: Cigarettes     Quit date: 1983     Smokeless tobacco: Former User     Quit date: 1986     Alcohol use No     Drug use: No      Comment: Drug use: No     Sexual activity: Not on file     Other Topics Concern     Not on file     Social History Narrative    Samantha Wife,      with two daughters from a previous marriage and five grandchildren.  Retired as a .  Lives with his wife on [facility]. " "      Current Outpatient Prescriptions   Medication Sig Dispense Refill     amoxicillin-clavulanate (AUGMENTIN) 875-125 MG per tablet Take 1 tablet by mouth 2 times daily 20 tablet 0     atorvastatin (LIPITOR) 20 MG tablet Take 1 tablet by mouth daily       ferrous sulfate 325 (65 FE) MG TBEC EC tablet Take 1 tablet by mouth daily       losartan-hydrochlorothiazide (HYZAAR) 50-12.5 MG per tablet Take 1 tablet by mouth daily       ranitidine (ZANTAC) 150 MG tablet Take 1 tablet by mouth 2 times daily       rivaroxaban ANTICOAGULANT (XARELTO) 20 MG TABS tablet Take 1 tablet by mouth daily (with dinner)         Allergies   Allergen Reactions     Aspirin      Other reaction(s): GI Upset     Cephalexin Hives     Thiopental      Other reaction(s): *Unknown - Childhood Rxn  Patient states \"almost  from Pentothal as child\"       ROS:  Pertinent positives and negatives are noted in HPI.    EXAM:  General appearance: well appearing male, in no acute distress  Head: normocephalic, atraumatic, frontal sinus tenderness  Ears: TM's with cone of light, no erythema, canals clear bilaterally  Eyes: conjunctivae normal  Orophayrnx: moist mucous membranes, tonsils without erythema, exudates or petechiae, post nasal drip seen  Nose: bilateral turbinates swollen, stringy discharge in nose  Neck: supple without adenopathy  Respiratory: clear to auscultation bilaterally  Cardiac: RRR with no murmurs  Psychological: normal affect, alert and pleasant      ASSESSMENT AND PLAN:    1. Acute non-recurrent frontal sinusitis      Suspect given hx that headache is related to sinus issues. Tx with Augmentin. Will f/u with PCP as needed if sx persist or worsen. Reviewed need to complete all antibiotics. Discussed typical course of illness, symptomatic treatment and when to return to clinic. Patient in agreement with plan and all questions were answered.         Rosangela Arreaga..................2018 3:04 PM  "

## 2018-05-18 NOTE — MR AVS SNAPSHOT
"              After Visit Summary   5/18/2018    Jared Parry    MRN: [id number]           Patient Information     Date Of Birth          1945        Visit Information        Provider Department      5/18/2018 2:45 PM Rosangela Arreaga APRN CNP [facility]        Today's Diagnoses     Acute non-recurrent frontal sinusitis    -  1      Care Instructions    Augmentin twice daily for 10 days  Take yogurt or probiotic while on antibiotic  Follow up in 2 weeks if headaches continue          Follow-ups after your visit        Your next 10 appointments already scheduled     May 21, 2018  2:40 PM CDT   [facility] with Stevie [facility]rawal [facility]   [facility]    [address]  [place] [address] [phone]              Who to contact     If you have questions or need follow up information about today's clinic visit or your schedule please contact [facility] directly at [phone].  Normal or non-critical lab and imaging results will be communicated to you by Yammerhart, letter or phone within 4 business days after the clinic has received the results. If you do not hear from us within 7 days, please contact the clinic through Oonyt or phone. If you have a critical or abnormal lab result, we will notify you by phone as soon as possible.  Submit refill requests through exurbe cosmetics or call your pharmacy and they will forward the refill request to us. Please allow 3 business days for your refill to be completed.          Additional Information About Your Visit        YammerharMobivox Information     [employer] lets you send messages to your doctor, view your test results, renew your prescriptions, schedule appointments and more. To sign up, go to [email or web address] . Click on \"Log in\" on the left side of the screen, which will take you to the Welcome page. Then click on \"Sign up Now\" on the right side of the page.     You will " be asked to enter the access code listed below, as well as some personal information. Please follow the directions to create your username and password.     Your access code is: [email or web address]  Expires: 2018  3:12 PM     Your access code will  in 90 days. If you need help or a new code, please call your [place] clinic or [phone].        Care EveryWhere ID     This is your Care EveryWhere ID. This could be used by other organizations to access your [place] medical records  [id number]        Your Vitals Were     Pulse BMI (Body Mass Index)                76 25.15 kg/m2           Blood Pressure from Last 3 Encounters:   18 130/80   01/10/18 122/78   17 102/68    Weight from Last 3 Encounters:   18 165 lb 6 oz (75 kg)   01/10/18 167 lb 9.6 oz (76 kg)   17 162 lb 12.8 oz (73.8 kg)              Today, you had the following     No orders found for display         Today's Medication Changes          These changes are accurate as of 18  3:12 PM.  If you have any questions, ask your nurse or doctor.               Start taking these medicines.        Dose/Directions    amoxicillin-clavulanate 875-125 MG per tablet   Commonly known as:  AUGMENTIN   Used for:  Acute non-recurrent frontal sinusitis   Started by:  Rosangela Arreaga APRN CNP        Dose:  1 tablet   Take 1 tablet by mouth 2 times daily   Quantity:  20 tablet   Refills:  0            Where to get your medicines      These medications were sent to [id number] - [place], [place] - [address]  [address], [facility] [address]     Phone:  [phone]     amoxicillin-clavulanate 875-125 MG per tablet                Primary Care Provider Office Phone # Fax #    Stevie Agrawal [phone] [address] [facility] [address]        Equal Access to Services     LC CONNER AH: Hadii fannie Madera, divine amanda, alberto mckinnon  jessica spraguechanelle tysoniam byrnesaan ah. So [facility] [phone].    ATENCIÓN: Si ignaciola teetee, tiene a cavanaugh disposición servicios gratuitos de asistencia lingüística. Chelly al [phone].    We comply with applicable federal civil rights laws and [place] laws. We do not discriminate on the basis of race, color, national origin, age, disability, sex, sexual orientation, or gender identity.            Thank you!     Thank you for choosing [facility] AND [facility]  for your care. Our goal is always to provide you with excellent care. Hearing back from our patients is one way we can continue to improve our services. Please take a few minutes to complete the written survey that you may receive in the mail after your visit with us. Thank you!             Your Updated Medication List - Protect others around you: Learn how to safely use, store and throw away your medicines at www.disposemymeds.org.          This list is accurate as of 5/18/18  3:12 PM.  Always use your most recent med list.                   Brand Name Dispense Instructions for use Diagnosis    amoxicillin-clavulanate 875-125 MG per tablet    AUGMENTIN    20 tablet    Take 1 tablet by mouth 2 times daily    Acute non-recurrent frontal sinusitis       atorvastatin 20 MG tablet    LIPITOR     Take 1 tablet by mouth daily        ferrous sulfate 325 (65 Fe) MG Tbec EC tablet      Take 1 tablet by mouth daily        losartan-hydrochlorothiazide 50-12.5 MG per tablet    HYZAAR     Take 1 tablet by mouth daily        ranitidine 150 MG tablet    ZANTAC     Take 1 tablet by mouth 2 times daily        rivaroxaban ANTICOAGULANT 20 MG Tabs tablet    XARELTO     Take 1 tablet by mouth daily (with dinner)

## 2018-05-18 NOTE — PATIENT INSTRUCTIONS
Augmentin twice daily for 10 days  Take yogurt or probiotic while on antibiotic  Follow up in 2 weeks if headaches continue

## 2018-05-19 ASSESSMENT — PATIENT HEALTH QUESTIONNAIRE - PHQ9: SUM OF ALL RESPONSES TO PHQ QUESTIONS 1-9: 0

## 2018-05-29 ENCOUNTER — OFFICE VISIT (OUTPATIENT)
Dept: FAMILY MEDICINE | Facility: OTHER | Age: 73
End: 2018-05-29
Attending: NURSE PRACTITIONER
Payer: COMMERCIAL

## 2018-05-29 VITALS
DIASTOLIC BLOOD PRESSURE: 70 MMHG | SYSTOLIC BLOOD PRESSURE: 122 MMHG | BODY MASS INDEX: 25.09 KG/M2 | HEART RATE: 60 BPM | WEIGHT: 165 LBS

## 2018-05-29 DIAGNOSIS — J30.1 CHRONIC SEASONAL ALLERGIC RHINITIS DUE TO POLLEN: ICD-10-CM

## 2018-05-29 DIAGNOSIS — G47.33 OSA ON CPAP: Primary | ICD-10-CM

## 2018-05-29 DIAGNOSIS — S70.261A TICK BITE OF HIP, RIGHT, INITIAL ENCOUNTER: ICD-10-CM
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
## 2018-05-29 PROCEDURE — G0463 HOSPITAL OUTPT CLINIC VISIT: HCPCS

## 2018-05-29 PROCEDURE — 99213 OFFICE O/P EST LOW 20 MIN: CPT | Performed by: NURSE PRACTITIONER

## 2018-05-29 RX ORDER — DOXYCYCLINE 100 MG/1
100 CAPSULE ORAL 2 TIMES DAILY
Qty: 6 CAPSULE | Refills: 0 | Status: SHIPPED | OUTPATIENT
Start: 2018-05-29 | End: 2018-06-01

## 2018-05-29 ASSESSMENT — PAIN SCALES - GENERAL: PAINLEVEL: NO PAIN (0)

## 2018-05-29 ASSESSMENT — ENCOUNTER SYMPTOMS
SINUS PAIN: 1
RHINORRHEA: 1

## 2018-05-29 NOTE — PATIENT INSTRUCTIONS
"  Tick Bites  Ticks are small arachnids that feed on the blood of rodents, rabbits, birds, deer, dogs, and people. A tick bite may cause a reaction like a spider bite. You may have redness, itching, and slight swelling at the site. Sometimes you may have no reaction where the tick bit you.  Ticks may gorge themselves for days before you find and remove them. The bites themselves aren't cause for concern. But ticks can carry and pass on illnesses such as Lyme disease and [place] spotted fever. Both diseases begin with a rash and symptoms similar to the flu. In advanced stages, these diseases can be quite serious.     A \"bull's eye\" rash is a common symptom of Lyme disease.   When to go to the emergency room (ER)  Not all ticks carry disease. And a tick must stay attached for at least 24 hours to infect you. If you find a tick, don't panic. Try to carefully remove it with tweezers. Grasp the tick near its head and pull without twisting. If you can't easily dislodge the tick or if you leave the head in your skin, get medical care right away.  What to expect in the ER    The tick or any parts of the tick will be removed and the bite will be cleaned.    To prevent disease, you may be given antibiotics. Both Lyme disease and [place] spotted fever respond quickly to these medicines.    You may be asked to see your healthcare provider for a blood test to check for Lyme disease.  Follow-up care  Some states and [facility] have services that test ticks for Lyme disease and other diseases. Check with your local officials to see if this service is available in your area.  If you remove a tick yourself, watch for signs of a tick-borne illness. Symptoms may show up within a few days or weeks after a bite. Call your healthcare provider if you notice any of the following:    Rash. The rash may spread outward in a ring from a hard white lump. Or it may move up your arms and legs to your chest.    Chills and fever    Body " aches and joint pain    Severe headache  Date Last Reviewed: 12/1/2016 2000-2017 [employer]. [address] [place] [address], [place] [address]. All rights reserved. This information is not intended as a substitute for professional medical care. Always follow your healthcare professional's instructions.

## 2018-05-29 NOTE — MR AVS SNAPSHOT
"              After Visit Summary   5/29/2018    Jared Parry    MRN: [id number]           Patient Information     Date Of Birth          1945        Visit Information        Provider Department      5/29/2018 12:45 PM Jacqueline Velasco APRN CNP [facility]        Today's Diagnoses     DUNIA on CPAP    -  1    Tick bite of hip, right, initial encounter        Chronic seasonal allergic rhinitis due to pollen          Care Instructions      Tick Bites  Ticks are small arachnids that feed on the blood of rodents, rabbits, birds, deer, dogs, and people. A tick bite may cause a reaction like a spider bite. You may have redness, itching, and slight swelling at the site. Sometimes you may have no reaction where the tick bit you.  Ticks may gorge themselves for days before you find and remove them. The bites themselves aren't cause for concern. But ticks can carry and pass on illnesses such as Lyme disease and [place] spotted fever. Both diseases begin with a rash and symptoms similar to the flu. In advanced stages, these diseases can be quite serious.     A \"bull's eye\" rash is a common symptom of Lyme disease.   When to go to the emergency room (ER)  Not all ticks carry disease. And a tick must stay attached for at least 24 hours to infect you. If you find a tick, don't panic. Try to carefully remove it with tweezers. Grasp the tick near its head and pull without twisting. If you can't easily dislodge the tick or if you leave the head in your skin, get medical care right away.  What to expect in the ER    The tick or any parts of the tick will be removed and the bite will be cleaned.    To prevent disease, you may be given antibiotics. Both Lyme disease and [place] spotted fever respond quickly to these medicines.    You may be asked to see your healthcare provider for a blood test to check for Lyme disease.  Follow-up care  Some states and counties have services that test ticks " for Lyme disease and other diseases. Check with your local officials to see if this service is available in your area.  If you remove a tick yourself, watch for signs of a tick-borne illness. Symptoms may show up within a few days or weeks after a bite. Call your healthcare provider if you notice any of the following:    Rash. The rash may spread outward in a ring from a hard white lump. Or it may move up your arms and legs to your chest.    Chills and fever    Body aches and joint pain    Severe headache  Date Last Reviewed: 12/1/2016 2000-2017 [employer]. [address] [place] [address]. All rights reserved. This information is not intended as a substitute for professional medical care. Always follow your healthcare professional's instructions.                Follow-ups after your visit        Follow-up notes from your care team     Return if symptoms worsen or fail to improve.      Your next 10 appointments already scheduled     Mauricio 15, 2018  2:00 PM CDT   PHYSICAL with Stevie Agrawal MD   [facility]    [address]  [facility] [id number] [phone]              Who to contact     If you have questions or need follow up information about today's clinic visit or your schedule please contact [facility] directly at [phone].  Normal or non-critical lab and imaging results will be communicated to you by MyChart, letter or phone within 4 business days after the clinic has received the results. If you do not hear from us within 7 days, please contact the clinic through MyChart or phone. If you have a critical or abnormal lab result, we will notify you by phone as soon as possible.  Submit refill requests through frintit or call your pharmacy and they will forward the refill request to us. Please allow 3 business days for your refill to be completed.          Additional Information About Your  Visit        [employer] Information     [employer] gives you secure access to your electronic health record. If you see a primary care provider, you can also send messages to your care team and make appointments. If you have questions, please call your primary care clinic.  If you do not have a primary care provider, please call [phone] and they will assist you.        Care EveryWhere ID     This is your Care EveryWhere ID. This could be used by other organizations to access your [place] medical records  [id number]        Your Vitals Were     Pulse BMI (Body Mass Index)                60 25.09 kg/m2           Blood Pressure from Last 3 Encounters:   05/29/18 122/70   05/18/18 130/80   01/10/18 122/78    Weight from Last 3 Encounters:   05/29/18 165 lb (74.8 kg)   05/18/18 165 lb 6 oz (75 kg)   01/10/18 167 lb 9.6 oz (76 kg)              Today, you had the following     No orders found for display         Today's Medication Changes          These changes are accurate as of 5/29/18  1:23 PM.  If you have any questions, ask your nurse or doctor.               Start taking these medicines.        Dose/Directions    doxycycline 100 MG capsule   Commonly known as:  VIBRAMYCIN   Used for:  Tick bite of hip, right, initial encounter   Started by:  Jacqueline Velasco APRN CNP        Dose:  100 mg   Take 1 capsule (100 mg) by mouth 2 times daily for 3 days   Quantity:  6 capsule   Refills:  0            Where to get your medicines      These medications were sent to [facility] - [place], [place] - [address]  [address], [facility] [address]     Phone:  [phone]     doxycycline 100 MG capsule                Primary Care Provider Office Phone # Fax #    Stevie Agrawal [phone] [address]  [facility] [address]        Equal Access to Services     CHRISTIN CONNER AH: Rachna Madera, wanicoleda vasiliy, qacedta ino sprague,  jessica grandachanelle frey'aan ah. So [facility] [phone].    ATENCIÓN: Si habla teetee, tiene a cavanaugh disposición servicios gratuitos de asistencia lingüística. Chelly morfin [phone].    We comply with applicable federal civil rights laws and [place] laws. We do not discriminate on the basis of race, color, national origin, age, disability, sex, sexual orientation, or gender identity.            Thank you!     Thank you for choosing [facility] AND [facility]  for your care. Our goal is always to provide you with excellent care. Hearing back from our patients is one way we can continue to improve our services. Please take a few minutes to complete the written survey that you may receive in the mail after your visit with us. Thank you!             Your Updated Medication List - Protect others around you: Learn how to safely use, store and throw away your medicines at www.disposemymeds.org.          This list is accurate as of 5/29/18  1:23 PM.  Always use your most recent med list.                   Brand Name Dispense Instructions for use Diagnosis    atorvastatin 20 MG tablet    LIPITOR     Take 1 tablet by mouth daily        doxycycline 100 MG capsule    VIBRAMYCIN    6 capsule    Take 1 capsule (100 mg) by mouth 2 times daily for 3 days    Tick bite of hip, right, initial encounter       losartan-hydrochlorothiazide 50-12.5 MG per tablet    HYZAAR     Take 1 tablet by mouth daily        ranitidine 150 MG tablet    ZANTAC     Take 1 tablet by mouth 2 times daily        rivaroxaban ANTICOAGULANT 20 MG Tabs tablet    XARELTO     Take 1 tablet by mouth daily (with dinner)

## 2018-05-29 NOTE — PROGRESS NOTES
SUBJECTIVE:   Jared Parry is a 72 year old male who presents to clinic today for the following health issues:    Presents with black tick that he found embedded right hip area uncertain of how long has an area about 2 cm diameter macular erythema.  Denies any systemic symptoms at this time    His completed Augmentin was treated for sinus infection, reports he initially felt better but then his sinus pain and occasional headache has returned a few days after starting the Augmentin  Reports he does have seasonal allergies with rhinitis and started using a nasal corticosteroid 3 days ago.  Has tried Claritin in the past but reports that has increased his blood pressure before.    He has an appointment with Dr. Agrawal in 2 weeks for his physical    Previously lived in [place], reports Dr. Maldonado was his vascular surgeon who had done his embolectomy in his left lower leg  He is on Xarelto----    HPI    Patient Active Problem List   Diagnosis     ACP (advance care planning)     Attention deficit disorder     Asthma     History of colonic polyps     Family history of ischemic heart disease     Esophageal reflux     Hyperlipidemia, mild     Hypertension     Nephrolithiasis     DUNIA on CPAP     Popliteal artery embolism, left (H)     Past Surgical History:   Procedure Laterality Date     APPENDECTOMY OPEN      No Comments Provided     CYSTOSCOPY, TRANSURETHRAL RESECTION (TUR) PROSTATE, COMBINED      No Comments Provided     HERNIORRHAPHY INGUINAL BILATERAL      No Comments Provided     OTHER SURGICAL HISTORY      263254,OTHER     OTHER SURGICAL HISTORY      01/25/2017,56738.0,TN COLONOSCOPY BIOPSY SINGLE OR MULTIPLE,tubular adenoma- due in 2022 if indicated     OTHER SURGICAL HISTORY      2016,GHX906,KIDNEY STONE SURGERY     OTHER SURGICAL HISTORY      2017,,EMBOLECTOMY,Bilateral,Popliteal       Social History   Substance Use Topics     Smoking status: Former Smoker     Types: Cigarettes     Quit date: 9/11/1983      "Smokeless tobacco: Former User     Quit date: 1986     Alcohol use No     Family History   Problem Relation Age of Onset     HEART DISEASE Father      Heart Disease, at age 56 of an MI     Hypertension Father      Hypertension     Hyperlipidemia Father      Hyperlipidemia     Substance Abuse Father      Alcohol/Drug,alcohol use     DIABETES Mother      Diabetes, at age 82     HEART DISEASE Mother      Heart Disease,\"enlarged heart.\"     Hypertension Brother      Hypertension     HEART DISEASE Brother      Heart Disease,CAD starting in his late 50s     Hyperlipidemia Brother      Hyperlipidemia,hyperlipidemia     Other - See Comments Brother      obesity     DIABETES Brother      Diabetes,type 2     Hypertension Brother      Hypertension     HEART DISEASE Brother      Heart Disease,CAD starting in his late 50s     Hyperlipidemia Brother      Hyperlipidemia     Other - See Comments Brother      obesity     DIABETES Brother      Diabetes,type 2     Hypertension Brother      Hypertension     HEART DISEASE Brother      Heart Disease     Prostate Cancer Brother      Cancer-prostate     Other - See Comments Sister       in her mid 60's.  Viral cardiomyopathy,     DIABETES Sister      Diabetes     Arthritis Sister      Arthritis     Hypertension Sister      Hypertension     Hyperlipidemia Sister      Hyperlipidemia, possible hyperlipidemia         Current Outpatient Prescriptions   Medication Sig Dispense Refill     atorvastatin (LIPITOR) 20 MG tablet Take 1 tablet by mouth daily       doxycycline (VIBRAMYCIN) 100 MG capsule Take 1 capsule (100 mg) by mouth 2 times daily for 3 days 6 capsule 0     losartan-hydrochlorothiazide (HYZAAR) 50-12.5 MG per tablet Take 1 tablet by mouth daily       ranitidine (ZANTAC) 150 MG tablet Take 1 tablet by mouth 2 times daily       rivaroxaban ANTICOAGULANT (XARELTO) 20 MG TABS tablet Take 1 tablet by mouth daily (with dinner)       Allergies   Allergen " "Reactions     Aspirin      Other reaction(s): GI Upset     Cephalexin Hives     Thiopental      Other reaction(s): *Unknown - Childhood Rxn  Patient states \"almost  from Pentothal as child\"     BP Readings from Last 3 Encounters:   18 122/70   18 130/80   01/10/18 122/78    Wt Readings from Last 3 Encounters:   18 165 lb (74.8 kg)   18 165 lb 6 oz (75 kg)   01/10/18 167 lb 9.6 oz (76 kg)                  Labs reviewed in EPIC    Review of Systems   HENT: Positive for congestion, postnasal drip, rhinorrhea and sinus pain.    Skin: Positive for rash.   All other systems reviewed and are negative.       OBJECTIVE:     /70 (BP Location: Right arm, Patient Position: Sitting, Cuff Size: Adult Regular)  Pulse 60  Wt 165 lb (74.8 kg)  BMI 25.09 kg/m2  Body mass index is 25.09 kg/(m^2).  Physical Exam   Constitutional: He is oriented to person, place, and time. He appears well-developed and well-nourished.   Cardiovascular: Normal rate.    Pulmonary/Chest: Effort normal.   Musculoskeletal: Normal range of motion.   Neurological: He is alert and oriented to person, place, and time.   Skin: Skin is warm and dry. Rash noted. There is erythema.   2-3 cm macular erythematous rash surrounding tick bite left anterior hip area  Tick appears mildly engorged   Psychiatric: He has a normal mood and affect. His behavior is normal. Judgment and thought content normal.   Nursing note and vitals reviewed.          ASSESSMENT/PLAN:   We will treat with prophylactic doxycycline ×3 days for embedded tick and history of multiple vascular comorbidities    Recommend using nasal saline and continuing to use his daily corticosteroid to improve congestion, sinus flow and hopefully reduce intermittent headaches        1. DUNIA on CPAP      2. Tick bite of hip, right, initial encounter    - doxycycline (VIBRAMYCIN) 100 MG capsule; Take 1 capsule (100 mg) by mouth 2 times daily for 3 days  Dispense: 6 capsule; " Refill: 0    3. Chronic seasonal allergic rhinitis due to pollen            KIANNA Schultz [facility]

## 2018-05-29 NOTE — NURSING NOTE
Patient presents for Tick bite patient feels thick was attached for less than 24 hours.   Edita Bronson LPN........................5/29/2018  12:47 PM

## 2018-06-15 ENCOUNTER — OFFICE VISIT (OUTPATIENT)
Dept: INTERNAL MEDICINE | Facility: OTHER | Age: 73
End: 2018-06-15
Attending: INTERNAL MEDICINE
Payer: MEDICARE

## 2018-06-15 VITALS
DIASTOLIC BLOOD PRESSURE: 70 MMHG | BODY MASS INDEX: 24.86 KG/M2 | HEIGHT: 68 IN | HEART RATE: 72 BPM | SYSTOLIC BLOOD PRESSURE: 122 MMHG | WEIGHT: 164 LBS

## 2018-06-15 DIAGNOSIS — F98.8 ATTENTION DEFICIT DISORDER, UNSPECIFIED HYPERACTIVITY PRESENCE: ICD-10-CM

## 2018-06-15 DIAGNOSIS — I74.3: ICD-10-CM

## 2018-06-15 DIAGNOSIS — I70.212 ATHEROSCLEROSIS OF NATIVE ARTERY OF LEFT LOWER EXTREMITY WITH INTERMITTENT CLAUDICATION (H): ICD-10-CM

## 2018-06-15 DIAGNOSIS — G47.33 OSA ON CPAP: ICD-10-CM

## 2018-06-15 DIAGNOSIS — E78.5 HYPERLIPIDEMIA, MILD: Primary | ICD-10-CM

## 2018-06-15 DIAGNOSIS — I10 ESSENTIAL HYPERTENSION: ICD-10-CM

## 2018-06-15 DIAGNOSIS — K21.9 GASTROESOPHAGEAL REFLUX DISEASE WITHOUT ESOPHAGITIS: ICD-10-CM

## 2018-06-15 DIAGNOSIS — R09.89 DECREASED CAROTID PULSE: ICD-10-CM

## 2018-06-15 PROBLEM — I70.219 ATHEROSCLEROSIS OF NATIVE ARTERY OF EXTREMITY WITH INTERMITTENT CLAUDICATION (H): Status: ACTIVE | Noted: 2018-06-15

## 2018-06-15 LAB
ALBUMIN SERPL-MCNC: 4.4 G/DL (ref 3.5–5.7)
ALP SERPL-CCNC: 69 U/L (ref 34–104)
ALT SERPL W P-5'-P-CCNC: 20 U/L (ref 7–52)
ANION GAP SERPL CALCULATED.3IONS-SCNC: 8 MMOL/L (ref 3–14)
AST SERPL W P-5'-P-CCNC: 20 U/L (ref 13–39)
BILIRUB SERPL-MCNC: 0.6 MG/DL (ref 0.3–1)
BUN SERPL-MCNC: 26 MG/DL (ref 7–25)
CALCIUM SERPL-MCNC: 9.6 MG/DL (ref 8.6–10.3)
CHLORIDE SERPL-SCNC: 102 MMOL/L (ref 98–107)
CHOLEST SERPL-MCNC: 141 MG/DL
CO2 SERPL-SCNC: 31 MMOL/L (ref 21–31)
CREAT SERPL-MCNC: 0.96 MG/DL (ref 0.7–1.3)
ERYTHROCYTE [DISTWIDTH] IN BLOOD BY AUTOMATED COUNT: 13.3 % (ref 10–15)
GFR SERPL CREATININE-BSD FRML MDRD: 77 ML/MIN/1.7M2
GLUCOSE SERPL-MCNC: 113 MG/DL (ref 70–105)
HCT VFR BLD AUTO: 43.7 % (ref 40–53)
HDLC SERPL-MCNC: 53 MG/DL (ref 23–92)
HGB BLD-MCNC: 14.6 G/DL (ref 13.3–17.7)
LDLC SERPL CALC-MCNC: 55 MG/DL
MCH RBC QN AUTO: 29.1 PG (ref 26.5–33)
MCHC RBC AUTO-ENTMCNC: 33.4 G/DL (ref 31.5–36.5)
MCV RBC AUTO: 87 FL (ref 78–100)
NONHDLC SERPL-MCNC: 88 MG/DL
PLATELET # BLD AUTO: 199 10E9/L (ref 150–450)
POTASSIUM SERPL-SCNC: 3.6 MMOL/L (ref 3.5–5.1)
PROT SERPL-MCNC: 6.8 G/DL (ref 6.4–8.9)
RBC # BLD AUTO: 5.02 10E12/L (ref 4.4–5.9)
SODIUM SERPL-SCNC: 141 MMOL/L (ref 134–144)
TRIGL SERPL-MCNC: 165 MG/DL
WBC # BLD AUTO: 9.3 10E9/L (ref 4–11)

## 2018-06-15 PROCEDURE — 85027 COMPLETE CBC AUTOMATED: CPT | Performed by: INTERNAL MEDICINE

## 2018-06-15 PROCEDURE — 80061 LIPID PANEL: CPT | Performed by: INTERNAL MEDICINE

## 2018-06-15 PROCEDURE — 36415 COLL VENOUS BLD VENIPUNCTURE: CPT | Performed by: INTERNAL MEDICINE

## 2018-06-15 PROCEDURE — 80053 COMPREHEN METABOLIC PANEL: CPT | Performed by: INTERNAL MEDICINE

## 2018-06-15 PROCEDURE — 99215 OFFICE O/P EST HI 40 MIN: CPT | Performed by: INTERNAL MEDICINE

## 2018-06-15 PROCEDURE — G0463 HOSPITAL OUTPT CLINIC VISIT: HCPCS

## 2018-06-15 ASSESSMENT — ENCOUNTER SYMPTOMS
NECK PAIN: 0
FLANK PAIN: 0
CONFUSION: 0
CHILLS: 0
WEAKNESS: 0
SLEEP DISTURBANCE: 0
ABDOMINAL DISTENTION: 0
NUMBNESS: 0
WOUND: 0
SINUS PRESSURE: 0
BLOOD IN STOOL: 0
FREQUENCY: 0
FEVER: 0
LIGHT-HEADEDNESS: 0
CHEST TIGHTNESS: 0
VOMITING: 0
AGITATION: 0
HALLUCINATIONS: 0
TROUBLE SWALLOWING: 0
HEADACHES: 0
EYE REDNESS: 0
EYE PAIN: 0
HEMATURIA: 0
WHEEZING: 0
NAUSEA: 0
JOINT SWELLING: 0
DIFFICULTY URINATING: 0
CONSTIPATION: 0
ABDOMINAL PAIN: 0
BRUISES/BLEEDS EASILY: 0
SINUS PAIN: 0
BACK PAIN: 0
SORE THROAT: 0
APPETITE CHANGE: 0
UNEXPECTED WEIGHT CHANGE: 0
SEIZURES: 0
SPEECH DIFFICULTY: 0
DYSURIA: 0
RHINORRHEA: 0
DIZZINESS: 0
PALPITATIONS: 0
ADENOPATHY: 0
VOICE CHANGE: 0
COUGH: 0
DIAPHORESIS: 0
NECK STIFFNESS: 0
DIARRHEA: 0
SHORTNESS OF BREATH: 0
TREMORS: 0
NERVOUS/ANXIOUS: 0
FATIGUE: 0

## 2018-06-15 NOTE — LETTER
Christelle 15, 2018      Jared Parry  [address] [facility] [place] [address]        Dear Jared Parry,    Below are the results of your recent labs:    Results for orders placed or performed in visit on 06/15/18   Comprehensive metabolic panel   Result Value Ref Range    Sodium 141 134 - 144 mmol/L    Potassium 3.6 3.5 - 5.1 mmol/L    Chloride 102 98 - 107 mmol/L    Carbon Dioxide 31 21 - 31 mmol/L    Anion Gap 8 3 - 14 mmol/L    Glucose 113 (H) 70 - 105 mg/dL    Urea Nitrogen 26 (H) 7 - 25 mg/dL    Creatinine 0.96 0.70 - 1.30 mg/dL    GFR Estimate 77 >60 mL/min/1.7m2    GFR Estimate If Black >90 >60 mL/min/1.7m2    Calcium 9.6 8.6 - 10.3 mg/dL    Bilirubin Total 0.6 0.3 - 1.0 mg/dL    Albumin 4.4 3.5 - 5.7 g/dL    Protein Total 6.8 6.4 - 8.9 g/dL    Alkaline Phosphatase 69 34 - 104 U/L    ALT 20 7 - 52 U/L    AST 20 13 - 39 U/L   Lipid Panel   Result Value Ref Range    Cholesterol 141 <200 mg/dL    Triglycerides 165 (H) <150 mg/dL    HDL Cholesterol 53 23 - 92 mg/dL    LDL Cholesterol Calculated 55 <100 mg/dL    Non HDL Cholesterol 88 <130 mg/dL   CBC W PLT No Diff   Result Value Ref Range    WBC 9.3 4.0 - 11.0 10e9/L    RBC Count 5.02 4.4 - 5.9 10e12/L    Hemoglobin 14.6 13.3 - 17.7 g/dL    Hematocrit 43.7 40.0 - 53.0 %    MCV 87 78 - 100 fl    MCH 29.1 26.5 - 33.0 pg    MCHC 33.4 31.5 - 36.5 g/dL    RDW 13.3 10.0 - 15.0 %    Platelet Count 199 150 - 450 10e9/L        Your blood tests look great.  Congratulations on this report and keep up the good work.    Sincerely,        Stevie Agrawal MD  Internal Medicine  [facility]

## 2018-06-15 NOTE — NURSING NOTE
The patient is here today to be seen for a refill on his medications.  Dahiana Alexis LPN on 6/15/2018 at 2:06 PM

## 2018-06-15 NOTE — MR AVS SNAPSHOT
After Visit Summary   6/15/2018    Jared Parry    MRN: [id number]           Patient Information     Date Of Birth          1945        Visit Information        Provider Department      6/15/2018 2:00 PM Stevie Agrawal MD [facility]        Today's Diagnoses     Hyperlipidemia, mild    -  1    Essential hypertension        DUNIA on CPAP        Popliteal artery embolism, left (H)        Gastroesophageal reflux disease without esophagitis        Attention deficit disorder, unspecified hyperactivity presence        Atherosclerosis of native artery of left lower extremity with intermittent claudication (H)        Decreased carotid pulse           Follow-ups after your visit        Future tests that were ordered for you today     Open Future Orders        Priority Expected Expires Ordered    US Carotid Bilateral Routine  6/15/2019 6/15/2018            Who to contact     If you have questions or need follow up information about today's clinic visit or your schedule please contact [facility] directly at [phone].  Normal or non-critical lab and imaging results will be communicated to you by MyChart, letter or phone within 4 business days after the clinic has received the results. If you do not hear from us within 7 days, please contact the clinic through [employer] or phone. If you have a critical or abnormal lab result, we will notify you by phone as soon as possible.  Submit refill requests through [employer] or call your pharmacy and they will forward the refill request to us. Please allow 3 business days for your refill to be completed.          Additional Information About Your Visit        MyChart Information     [employer] gives you secure access to your electronic health record. If you see a primary care provider, you can also send messages to your care team and make appointments. If you have questions, please call your primary care clinic.  If you do not  "have a primary care provider, please call [phone] and they will assist you.        Care EveryWhere ID     This is your Care EveryWhere ID. This could be used by other organizations to access your [place] medical records  [id number]        Your Vitals Were     Pulse Height BMI (Body Mass Index)             72 5' 8\" (1.727 m) 24.94 kg/m2          Blood Pressure from Last 3 Encounters:   06/15/18 122/70   05/29/18 122/70   05/18/18 130/80    Weight from Last 3 Encounters:   06/15/18 164 lb (74.4 kg)   05/29/18 165 lb (74.8 kg)   05/18/18 165 lb 6 oz (75 kg)              We Performed the Following     CBC W PLT No Diff     Comprehensive metabolic panel     Lipid Panel        Primary Care Provider Office Phone # Fax #    Stevie Agrawal [phone] [address] [facility] [address]        Equal Access to Services     [facility]: Hadii aad ku hadasho Somary jane, waaxda luqadaha, qaybta kaalmada adeegyada, jessica sorenson . So [facility] [phone].    ATENCIÓN: Si habla español, tiene a cavanaugh disposición servicios gratuitos de asistencia lingüística. Llame al [phone].    We comply with applicable federal civil rights laws and [place] laws. We do not discriminate on the basis of race, color, national origin, age, disability, sex, sexual orientation, or gender identity.            Thank you!     Thank you for choosing [facility]  for your care. Our goal is always to provide you with excellent care. Hearing back from our patients is one way we can continue to improve our services. Please take a few minutes to complete the written survey that you may receive in the mail after your visit with us. Thank you!             Your Updated Medication List - Protect others around you: Learn how to safely use, store and throw away your medicines at www.disposemymeds.org.          This list is accurate as of 6/15/18  2:39 PM.  Always use your most " recent med list.                   Brand Name Dispense Instructions for use Diagnosis    atorvastatin 20 MG tablet    LIPITOR     Take 1 tablet by mouth daily        losartan-hydrochlorothiazide 50-12.5 MG per tablet    HYZAAR     Take 1 tablet by mouth daily        ranitidine 150 MG tablet    ZANTAC     Take 1 tablet by mouth 2 times daily        rivaroxaban ANTICOAGULANT 20 MG Tabs tablet    XARELTO     Take 1 tablet by mouth daily (with dinner)

## 2018-06-15 NOTE — PROGRESS NOTES
Chief Complaint:  This patient is here for a comprehensive review of their multiplemedical problems and review of medications, renewal of medications and update on necessary health maintenance issues.      HPI: This patient is here today for complete evaluation and review of his chronic medical problems.  He is feeling well in most respects.  He has a history of hypertension and is on single drug therapy for that.  He tolerates this medication without difficulty and has excellent control of his blood pressure.  He has hyperlipidemia and is treated with moderate intensity statin therapy.  He has good results with this and no side effects.  He does have some atherosclerotic peripheral vascular disease necessitating treatment with the statin.    Within the last year the patient [place]  The source of the thrombus was never found.  He is on long-term Xarelto which he tolerates without difficulty.  He does not have any bleeding problems at all.    He has a couple spots on his cheeks that he would like looked at.  He has had some recent troubles with sinuses and allergies but is now better with Flonase.  He has no other complaints or concerns.    Medications are reconciled.  Past medical history, past surgical history, family history and social histories are reviewed and updated.  Immunizations up-to-date.  Colonoscopy up-to-date.    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)     2017     Enlarged prostate without lower urinary tract symptoms (luts)     No Comments Provided     Essential (primary) hypertension     No Comments Provided     GERD (gastroesophageal reflux disease)      Hyperlipidemia     No Comments Provided     DUNIA on CPAP        Past  "Surgical History:   Procedure Laterality Date     APPENDECTOMY OPEN      No Comments Provided     AS REMOVAL OF KIDNEY STONE  2016     COLONOSCOPY  2017    Adenomatous, f/u in      CYSTOSCOPY, TRANSURETHRAL RESECTION (TUR) PROSTATE, COMBINED      No Comments Provided     EMBOLECTOMY LOWER EXTREMITY  2017    Popliteal     HERNIORRHAPHY INGUINAL BILATERAL      No Comments Provided     OPEN REDUCTION INTERNAL FIXATION FEMUR DISTAL         Current Outpatient Prescriptions   Medication Sig Dispense Refill     atorvastatin (LIPITOR) 20 MG tablet Take 1 tablet by mouth daily       losartan-hydrochlorothiazide (HYZAAR) 50-12.5 MG per tablet Take 1 tablet by mouth daily       ranitidine (ZANTAC) 150 MG tablet Take 1 tablet by mouth 2 times daily       rivaroxaban ANTICOAGULANT (XARELTO) 20 MG TABS tablet Take 1 tablet by mouth daily (with dinner)         Allergies   Allergen Reactions     Aspirin      Other reaction(s): GI Upset     Cephalexin Hives     Thiopental      Other reaction(s): *Unknown - Childhood Rxn  Patient states \"almost  from Pentothal as child\"       Family History   Problem Relation Age of Onset     HEART DISEASE Father       at age 56 of an MI     Hypertension Father      Hyperlipidemia Father      Substance Abuse Father      alcohol use     DIABETES Mother      HEART DISEASE Mother      Hypertension Brother      HEART DISEASE Brother      Heart Disease,CAD starting in his late 50s     Hyperlipidemia Brother      Other - See Comments Brother      obesity     DIABETES Brother      Hypertension Brother      HEART DISEASE Brother      Hyperlipidemia Brother      Other - See Comments Brother      obesity     DIABETES Brother      Hypertension Brother      HEART DISEASE Brother      Prostate Cancer Brother      Other - See Comments Sister       in her mid 60's.  Viral cardiomyopathy,     DIABETES Sister      Arthritis Sister      Hypertension Sister      Hyperlipidemia Sister  "       Social History     Social History     Marital status:      Spouse name: N/A     Number of children: N/A     Years of education: N/A     Occupational History     Not on file.     Social History Main Topics     Smoking status: Former Smoker     Types: Cigarettes     Quit date: 9/11/1983     Smokeless tobacco: Former User     Quit date: 9/11/1986     Alcohol use No     Drug use: No      Comment: Drug use: No     Sexual activity: Not on file     Other Topics Concern     Not on file     Social History Narrative     with two daughters from a previous marriage and five grandchildren.  Retired as a .  Lives with his wife on [address] with wife Samantha in [place].       Review of Systems   Constitutional: Negative for appetite change, chills, diaphoresis, fatigue, fever and unexpected weight change.   HENT: Negative for ear pain, rhinorrhea, sinus pain, sinus pressure, sore throat, trouble swallowing and voice change.    Eyes: Negative for pain, redness and visual disturbance.   Respiratory: Negative for cough, chest tightness, shortness of breath and wheezing.    Cardiovascular: Negative for chest pain, palpitations and leg swelling.   Gastrointestinal: Negative for abdominal distention, abdominal pain, blood in stool, constipation, diarrhea, nausea and vomiting.   Endocrine: Negative for cold intolerance and heat intolerance.   Genitourinary: Negative for difficulty urinating, dysuria, flank pain, frequency and hematuria.   Musculoskeletal: Negative for back pain, joint swelling, neck pain and neck stiffness.   Skin: Negative for rash and wound.   Allergic/Immunologic: Negative for immunocompromised state.   Neurological: Negative for dizziness, tremors, seizures, syncope, speech difficulty, weakness, light-headedness, numbness and headaches.   Hematological: Negative for adenopathy. Does not bruise/bleed easily.   Psychiatric/Behavioral: Negative for agitation, behavioral  problems, confusion, hallucinations and sleep disturbance. The patient is not nervous/anxious.        Physical Exam   Constitutional: He is oriented to person, place, and time. He appears well-developed and well-nourished. No distress.   HENT:   Head: Normocephalic.   Right Ear: External ear normal.   Left Ear: External ear normal.   Nose: Nose normal.   Mouth/Throat: Oropharynx is clear and moist.   Eyes: Conjunctivae are normal. Pupils are equal, round, and reactive to light.   Neck: Normal range of motion. Neck supple. Decreased carotid pulses present. No JVD present. Carotid bruit is not present. No tracheal deviation present. No thyromegaly present.   Decreased carotid pulsation noted on the right   Cardiovascular: Normal rate and regular rhythm.   Occasional extrasystoles are present. Exam reveals no gallop and no friction rub.    No murmur heard.  Pulmonary/Chest: Effort normal and breath sounds normal. No respiratory distress. He has no wheezes. He has no rales.   Abdominal: Soft. Bowel sounds are normal. He exhibits no distension and no mass. There is no tenderness. There is no rebound and no guarding. No hernia.   Genitourinary: Rectum normal, prostate normal and penis normal.   Musculoskeletal: Normal range of motion. He exhibits no edema.   Lymphadenopathy:     He has no cervical adenopathy.   Neurological: He is alert and oriented to person, place, and time. He has normal reflexes. No cranial nerve deficit. He exhibits normal muscle tone. Coordination normal.   Skin: Skin is warm and dry. No rash noted. He is not diaphoretic.   Actinic lesions on his cheeks, nothing of concern.   Psychiatric: He has a normal mood and affect. His behavior is normal.   Nursing note and vitals reviewed.      Assessment:      ICD-10-CM    1. Hyperlipidemia, mild E78.5 Comprehensive metabolic panel     Lipid Panel   2. Essential hypertension I10    3. DUNIA on CPAP G47.33     Z99.89    4. Popliteal artery embolism, left (H)  I74.3 CBC W PLT No Diff   5. Gastroesophageal reflux disease without esophagitis K21.9    6. Attention deficit disorder, unspecified hyperactivity presence F98.8    7. Atherosclerosis of native artery of left lower extremity with intermittent claudication (H) I70.212    8. Decreased carotid pulse R09.89 US Carotid Bilateral        Plan: This patient appears to be stable at this point in time.  Since he is tolerating the anticoagulation, I would not make any changes with this.  He needs to be treated aggressively because of his vascular disease so he will continue with his current medications including his statin therapy.  Complete lab drawn and pending, I will send him a letter with the results.  Carotid ultrasound scheduled to rule out carotid artery disease, I will let him know those results as well.  He will be following up with his vascular [place]  We talked about having him seen by cardiology or hematology here but we will hold on that for the present time.  Continue taking Flonase and add Zyrtec if needed for headaches and allergy symptoms, return for reevaluation on those if needed.

## 2018-06-16 ASSESSMENT — PATIENT HEALTH QUESTIONNAIRE - PHQ9: SUM OF ALL RESPONSES TO PHQ QUESTIONS 1-9: 0

## 2018-06-16 ASSESSMENT — ASTHMA QUESTIONNAIRES: ACT_TOTALSCORE: 25

## 2018-06-18 ENCOUNTER — HOSPITAL ENCOUNTER (OUTPATIENT)
Dept: ULTRASOUND IMAGING | Facility: OTHER | Age: 73
Discharge: HOME | End: 2018-06-18
Attending: INTERNAL MEDICINE | Admitting: INTERNAL MEDICINE
Payer: MEDICARE

## 2018-06-18 DIAGNOSIS — R09.89 DECREASED CAROTID PULSE: ICD-10-CM

## 2018-06-18 PROCEDURE — 93880 EXTRACRANIAL BILAT STUDY: CPT

## 2018-07-23 NOTE — PROGRESS NOTES
Patient Information     Patient Name  Jared Parry MRN  [id number] Sex  Male   1945      Letter by Stevie Agrawal MD at      Author:  Stevie Agrawal MD Service:  (none) Author Type:  (none)    Filed:   Encounter Date:  2017 Status:  (Other)           Jared Parry  [address] [address] [address] [address]          2017    Dear Mr. Parry:    This letter is to remind you that you are due for your annual exam with Stevie Agrawal MD. Your last comprehensive visit was more than 12 months ago.    A LIMITED refill of losartan-hydrochlorothiazide, 50-12.5 mg, (HYZAAR) 50-12.5 mg tablet has been called into your pharmacy. Additional refills require you to complete this appointment.    Please call the clinic at [phone] to schedule your appointment.    If you should require additional refills before your scheduled appointment, please contact your pharmacy and we will refill your medication until the date of your appointment.    If you are no longer seeing Stevie Agrawal MD for primary care, please call to let us know. Doing so will remove you from our call/contact list.      Thank you for choosing [facility] and [facility] for your health care needs.    Sincerely,    Refill RN  [facility]

## 2018-07-24 NOTE — PROGRESS NOTES
Patient Information     Patient Name  Jared Parry MRN  [id number] Sex  Male   1945      Letter by Marianne Nguyễn MD at      Author:  Marianne Nguyễn MD Service:  (none) Author Type:  (none)    Filed:   Encounter Date:  2017 Status:  (Other)           Jared Parry  [address] [address] [address]          2017    Dear Mr. Parry:    This letter is in regards to your colonoscopy that was done by Dr Nguyễn on 17.   The polyp(s) removed from your colon were TUBULAR ADENOMAS.  Adenomatous polyps are  pre-cancerous, yet BENIGN.  The polyp was removed. You have an increases risk for developing other polyps in the future.   Dr. Nguyễn recommends a repeat colonoscopy in 5 years if your primary care giver thinks it is appropriate.      Dr. Nguyễn also recommends a high fiber diet. A fiber supplement such as Metamucil, FiberCon, or Citrucel is recommended. Generic forms of these supplements are fine. These are available over the counter.       If you have any questions regarding this report, please call the Surgery department at [phone].  A copy of this report will be placed in your electronic medical record for your primary care provider's review.    Sincerely,      Carmen Cohen  General Surgery      Reviewed and electronically signed by provider.

## 2018-08-20 DIAGNOSIS — I10 ESSENTIAL HYPERTENSION: Primary | ICD-10-CM

## 2018-08-22 RX ORDER — LOSARTAN POTASSIUM AND HYDROCHLOROTHIAZIDE 12.5; 5 MG/1; MG/1
TABLET ORAL
Qty: 90 TABLET | Refills: 2 | Status: SHIPPED | OUTPATIENT
Start: 2018-08-22 | End: 2019-02-13

## 2018-08-22 NOTE — TELEPHONE ENCOUNTER
Hyzaar  LOV and labs-06/15/2018  Prescription refilled per RN Medication RefillPolicy.................... Nayeli Ledesma ....................  8/22/2018   3:26 PM

## 2018-11-13 ENCOUNTER — TRANSFERRED RECORDS (OUTPATIENT)
Dept: HEALTH INFORMATION MANAGEMENT | Facility: OTHER | Age: 73
End: 2018-11-13

## 2018-12-26 ENCOUNTER — OFFICE VISIT (OUTPATIENT)
Dept: INTERNAL MEDICINE | Facility: OTHER | Age: 73
End: 2018-12-26
Attending: INTERNAL MEDICINE
Payer: MEDICARE

## 2018-12-26 VITALS
RESPIRATION RATE: 16 BRPM | BODY MASS INDEX: 26 KG/M2 | HEART RATE: 72 BPM | DIASTOLIC BLOOD PRESSURE: 78 MMHG | TEMPERATURE: 98.3 F | WEIGHT: 171 LBS | SYSTOLIC BLOOD PRESSURE: 136 MMHG

## 2018-12-26 DIAGNOSIS — R05.9 COUGH: Primary | ICD-10-CM

## 2018-12-26 LAB
FLUAV+FLUBV RNA SPEC QL NAA+PROBE: NEGATIVE
FLUAV+FLUBV RNA SPEC QL NAA+PROBE: NEGATIVE
RSV RNA SPEC NAA+PROBE: NEGATIVE
SPECIMEN SOURCE: NORMAL

## 2018-12-26 PROCEDURE — 99213 OFFICE O/P EST LOW 20 MIN: CPT | Performed by: INTERNAL MEDICINE

## 2018-12-26 PROCEDURE — G0463 HOSPITAL OUTPT CLINIC VISIT: HCPCS

## 2018-12-26 PROCEDURE — 87631 RESP VIRUS 3-5 TARGETS: CPT | Performed by: INTERNAL MEDICINE

## 2018-12-26 RX ORDER — CLOPIDOGREL BISULFATE 75 MG/1
75 TABLET ORAL DAILY
Qty: 90 TABLET | Refills: 3 | COMMUNITY
Start: 2018-12-26 | End: 2019-06-26

## 2018-12-26 RX ORDER — BUSPIRONE HYDROCHLORIDE 10 MG/1
TABLET ORAL
Qty: 270 TABLET | Refills: 3 | COMMUNITY
Start: 2018-12-26 | End: 2019-06-26

## 2018-12-26 RX ORDER — CODEINE PHOSPHATE AND GUAIFENESIN 10; 100 MG/5ML; MG/5ML
2 SOLUTION ORAL EVERY 4 HOURS PRN
Qty: 236 ML | Refills: 0 | Status: SHIPPED | OUTPATIENT
Start: 2018-12-26 | End: 2019-06-26

## 2018-12-26 ASSESSMENT — ENCOUNTER SYMPTOMS
CONSTITUTIONAL NEGATIVE: 1
ENDOCRINE NEGATIVE: 1
EYES NEGATIVE: 1
ALLERGIC/IMMUNOLOGIC NEGATIVE: 1

## 2018-12-26 ASSESSMENT — PAIN SCALES - GENERAL: PAINLEVEL: NO PAIN (0)

## 2018-12-26 NOTE — PROGRESS NOTES
"Chief Complaint:  Flu symptoms.    HPI: This patient comes in today complaining that he has the flu.  He has been sick for 2-3 days with a cough and head congestion.  He has not had a fever.  He is a little bit of a sore throat with this.    He had a number of other issues he wanted to discuss as well including his anxiety and his anticoagulants.  Those were reviewed and medication list was updated today.  He was concerned because his blood pressure would go up when he is anxious, I told him that was a normal physiologic response.    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)     2017     Enlarged prostate without lower urinary tract symptoms (luts)     No Comments Provided     Essential (primary) hypertension     No Comments Provided     GERD (gastroesophageal reflux disease)      Hyperlipidemia     No Comments Provided     DUNIA on CPAP        Past Surgical History:   Procedure Laterality Date     APPENDECTOMY OPEN      No Comments Provided     AS REMOVAL OF KIDNEY STONE  2016     COLONOSCOPY  2017    Adenomatous, f/u in      CYSTOSCOPY, TRANSURETHRAL RESECTION (TUR) PROSTATE, COMBINED      No Comments Provided     EMBOLECTOMY LOWER EXTREMITY  2017    Popliteal     HERNIORRHAPHY INGUINAL BILATERAL      No Comments Provided     OPEN REDUCTION INTERNAL FIXATION FEMUR DISTAL         Allergies   Allergen Reactions     Aspirin      Other reaction(s): GI Upset     Cephalexin Hives     Thiopental      Other reaction(s): *Unknown - Childhood Rxn  Patient states \"almost  from Pentothal as child\"       Current Outpatient Medications   Medication Sig Dispense Refill     atorvastatin (LIPITOR) 20 MG tablet Take 1 tablet by mouth daily       busPIRone (BUSPAR) 10 MG tablet 1/2 to 1 q 8 hours " prn anxiety 270 tablet 3     clopidogrel (PLAVIX) 75 MG tablet Take 1 tablet (75 mg) by mouth daily 90 tablet 3     losartan-hydrochlorothiazide (HYZAAR) 50-12.5 MG per tablet TAKE 1 TABLET BY MOUTH EVERY DAY 90 tablet 2     ranitidine (ZANTAC) 150 MG tablet Take 1 tablet by mouth 2 times daily         Review of Systems   Constitutional: Negative.    Eyes: Negative.    Endocrine: Negative.    Allergic/Immunologic: Negative.        Physical Exam   Constitutional: He appears well-developed and well-nourished. No distress.   HENT:   Mouth/Throat: Oropharynx is clear and moist. No oropharyngeal exudate.   Neck: Normal range of motion.   Pulmonary/Chest: Effort normal and breath sounds normal.   Lymphadenopathy:     He has no cervical adenopathy.   Skin: He is not diaphoretic.   Nursing note and vitals reviewed.      Assessment:        ICD-10-CM    1. Cough R05 Influenza A and B and RSV PCR       Plan: Although I had a low index of suspicion, the patient was convinced that he had the flu so I did a influenza screen which was negative.  He has a viral URI, reassurance provided.  Cough syrup was also prescribed.  Follow-up if not improving.  He wanted to try back on the Advair which seems reasonable so a prescription was given for that as well.

## 2018-12-26 NOTE — NURSING NOTE
Patient presenting with a 2 to 3 day history of a cough, sore throat, and body aches.  No LMP for male patient.  Medication Reconciliation: complete    Ayleen Flores LPN  12/26/2018 11:13 AM

## 2019-02-13 DIAGNOSIS — I10 ESSENTIAL HYPERTENSION: ICD-10-CM

## 2019-02-15 RX ORDER — LOSARTAN POTASSIUM AND HYDROCHLOROTHIAZIDE 12.5; 5 MG/1; MG/1
TABLET ORAL
Qty: 90 TABLET | Refills: 0 | Status: SHIPPED | OUTPATIENT
Start: 2019-02-15 | End: 2019-05-14

## 2019-02-15 NOTE — TELEPHONE ENCOUNTER
"[id number] in [place], [place], sent Rx request for the following:      LOSARTAN/HCTZ 50/12.5MG TABLETS  Sig: TAKE 1 TABLET BY MOUTH EVERY DAY    Last Written Prescription:  losartan-hydrochlorothiazide (HYZAAR) 50-12.5 MG per tablet 90 tablet 2 2018  No   Sig: TAKE 1 TABLET BY MOUTH EVERY DAY   Sent to pharmacy as: losartan-hydrochlorothiazide (HYZAAR) 50-12.5 MG per tablet     [id number] - [place], [place] - [address]     Called and spoke with KAIA (Pharmacist) at [facility] in [facility], after verifying Patient's last name and . He states that the above Rx was transferred to [facility]'s pharmacy (location unknown) and then transferred to their store with no refills. They are seeking a new Rx, as \"the refills must have dropped off somewhere along the line.\"    Last Office Visit: 18 (cough), 6/15/18 (comprehensive review)  Future Office visit: None.    Prescription approved per [facility] Refill Protocol for 90 days at this time. Cassia Rivera RN .............. 2/15/2019  2:07 PM            "

## 2019-02-27 ENCOUNTER — TRANSFERRED RECORDS (OUTPATIENT)
Dept: HEALTH INFORMATION MANAGEMENT | Facility: OTHER | Age: 74
End: 2019-02-27

## 2019-05-14 DIAGNOSIS — I10 ESSENTIAL HYPERTENSION: ICD-10-CM

## 2019-05-14 NOTE — LETTER
May 16, 2019      Jared Parry  [address]  [place] [address]        A refill request was received from your pharmacy for lisinopril-hydrochlorothiazide .    Additional refills require an office visit with Dr. Agrawal sometime after 6/15/18 for annual review and labs.    Please call [phone] to schedule appointment.      Sincerely,      Refill Nurse

## 2019-05-16 RX ORDER — LOSARTAN POTASSIUM AND HYDROCHLOROTHIAZIDE 12.5; 5 MG/1; MG/1
TABLET ORAL
Qty: 90 TABLET | Refills: 0 | Status: SHIPPED | OUTPATIENT
Start: 2019-05-16 | End: 2019-06-26

## 2019-05-16 NOTE — TELEPHONE ENCOUNTER
Prescription approved per [facility] Refill Protocol.  LOV and labs: 6/15/18  Patient will be due for annual review and labs.    Letter and my chart sent.  Cherri Flores RN on 5/16/2019 at 2:43 PM

## 2019-05-18 ENCOUNTER — DOCUMENTATION ONLY (OUTPATIENT)
Dept: OTHER | Facility: CLINIC | Age: 74
End: 2019-05-18

## 2019-05-21 ENCOUNTER — TELEPHONE (OUTPATIENT)
Dept: INTERNAL MEDICINE | Facility: OTHER | Age: 74
End: 2019-05-21

## 2019-05-21 DIAGNOSIS — J45.20 MILD INTERMITTENT ASTHMA, UNCOMPLICATED: Primary | ICD-10-CM

## 2019-05-21 DIAGNOSIS — I74.3 EMBOLISM AND THROMBOSIS OF ARTERY OF LOWER EXTREMITY (H): ICD-10-CM

## 2019-05-21 NOTE — TELEPHONE ENCOUNTER
Received a fax from [facility] stating that the patients insurance will not cover his advair 250/50mcg, but they will cover the advair disk UAER or breo elliptain. Please choose inhaler for patient.   Dahiana Alexis LPN on 5/21/2019 at 8:38 AM

## 2019-06-03 ENCOUNTER — OFFICE VISIT (OUTPATIENT)
Dept: PEDIATRICS | Facility: OTHER | Age: 74
End: 2019-06-03
Attending: INTERNAL MEDICINE
Payer: COMMERCIAL

## 2019-06-03 VITALS
WEIGHT: 165.5 LBS | RESPIRATION RATE: 20 BRPM | TEMPERATURE: 97.7 F | BODY MASS INDEX: 25.08 KG/M2 | HEIGHT: 68 IN | OXYGEN SATURATION: 98 % | HEART RATE: 70 BPM | DIASTOLIC BLOOD PRESSURE: 80 MMHG | SYSTOLIC BLOOD PRESSURE: 126 MMHG

## 2019-06-03 DIAGNOSIS — Z87.891 HISTORY OF TOBACCO USE DISORDER: ICD-10-CM

## 2019-06-03 DIAGNOSIS — R05.9 COUGH: Primary | ICD-10-CM

## 2019-06-03 PROCEDURE — 99213 OFFICE O/P EST LOW 20 MIN: CPT | Performed by: INTERNAL MEDICINE

## 2019-06-03 PROCEDURE — G0463 HOSPITAL OUTPT CLINIC VISIT: HCPCS

## 2019-06-03 RX ORDER — INHALER, ASSIST DEVICES
SPACER (EA) MISCELLANEOUS
Qty: 1 EACH | Refills: 11 | Status: SHIPPED | OUTPATIENT
Start: 2019-06-03 | End: 2019-06-26

## 2019-06-03 RX ORDER — ALBUTEROL SULFATE 90 UG/1
2 AEROSOL, METERED RESPIRATORY (INHALATION) EVERY 4 HOURS PRN
Qty: 8.5 G | Refills: 11 | Status: SHIPPED | OUTPATIENT
Start: 2019-06-03 | End: 2020-01-08

## 2019-06-03 ASSESSMENT — ANXIETY QUESTIONNAIRES
6. BECOMING EASILY ANNOYED OR IRRITABLE: NOT AT ALL
GAD7 TOTAL SCORE: 0
7. FEELING AFRAID AS IF SOMETHING AWFUL MIGHT HAPPEN: NOT AT ALL
3. WORRYING TOO MUCH ABOUT DIFFERENT THINGS: NOT AT ALL
1. FEELING NERVOUS, ANXIOUS, OR ON EDGE: NOT AT ALL
2. NOT BEING ABLE TO STOP OR CONTROL WORRYING: NOT AT ALL
5. BEING SO RESTLESS THAT IT IS HARD TO SIT STILL: NOT AT ALL
IF YOU CHECKED OFF ANY PROBLEMS ON THIS QUESTIONNAIRE, HOW DIFFICULT HAVE THESE PROBLEMS MADE IT FOR YOU TO DO YOUR WORK, TAKE CARE OF THINGS AT HOME, OR GET ALONG WITH OTHER PEOPLE: NOT DIFFICULT AT ALL

## 2019-06-03 ASSESSMENT — MIFFLIN-ST. JEOR: SCORE: 1475.2

## 2019-06-03 ASSESSMENT — PAIN SCALES - GENERAL: PAINLEVEL: MILD PAIN (2)

## 2019-06-03 ASSESSMENT — PATIENT HEALTH QUESTIONNAIRE - PHQ9: 5. POOR APPETITE OR OVEREATING: NOT AT ALL

## 2019-06-03 NOTE — PROGRESS NOTES
"Subjective  Jared Parry is a 73 year old male with a PMH notable for HTN, GERD, and DUNIA on CPAP who presents for 2 days of concerns of having the flu. Starting  he started having increasing fatigue, muscle aches, sore throat, a runny nose with clear drainage, a mild cough with yellow sputum production, mild SOB, and chills. He denies any sick contacts or chest pains and says that he did receive his flu vaccine in the fall.     Upon further questioning is sounds as if Jared has had recurring \"flu\" like symptoms that occur almost monthly at times, sometimes lasting up to 2 weeks. He said there was a period of a few years where he went without the flu after taking a few months of a maintenance inhaler. He has not been using this for many years now and has never used a rescue inhaler. He does see a pulmonologist for his DUNIA, but on chart review they did not seem to address his recurrent URIs. He also see occupational health and says that they have had him perform yearly breathing tests that he consistently performs poorly on, but these records couldn't be found.     He says that he cleans his CPAP regularly.     He has approximately 30 pack year smoking history but quit many years ago.      Problem List/PMH: reviewed in EMR, and made relevant updates today.  Medications: reviewed in EMR, and made relevant updates today.  Allergies: reviewed in EMR, and made relevant updates today.    Social Hx:  Social History     Tobacco Use     Smoking status: Former Smoker     Packs/day: 1.00     Years: 30.00     Pack years: 30.00     Types: Cigarettes     Last attempt to quit: 1983     Years since quittin.7     Smokeless tobacco: Former User     Quit date: 1986   Substance Use Topics     Alcohol use: No     Alcohol/week: 0.0 oz     Drug use: No     Comment: Drug use: No     Social History     Social History Narrative     with two daughters from a previous marriage and five grandchildren.  Retired as a " ".  Lives with his wife on [address] with wife Samantha in [place].     I reviewed social history and made relevant updates today.    Family Hx:   Family History   Problem Relation Age of Onset     Heart Disease Father          at age 56 of an MI     Hypertension Father      Hyperlipidemia Father      Substance Abuse Father         alcohol use     Diabetes Mother      Heart Disease Mother      Hypertension Brother      Heart Disease Brother         Heart Disease,CAD starting in his late 50s     Hyperlipidemia Brother      Other - See Comments Brother         obesity     Diabetes Brother      Hypertension Brother      Heart Disease Brother      Hyperlipidemia Brother      Other - See Comments Brother         obesity     Diabetes Brother      Hypertension Brother      Heart Disease Brother      Prostate Cancer Brother      Other - See Comments Sister          in her mid 60's.  Viral cardiomyopathy,     Diabetes Sister      Arthritis Sister      Hypertension Sister      Hyperlipidemia Sister        Objective  Vitals: reviewed in EMR.  /80 (BP Location: Right arm, Patient Position: Sitting, Cuff Size: Adult Large)   Pulse 70   Temp 97.7  F (36.5  C) (Tympanic)   Resp 20   Ht 1.727 m (5' 8\")   Wt 75.1 kg (165 lb 8 oz)   SpO2 98%   BMI 25.16 kg/m      Gen: Pleasant male, NAD.  HEENT: MMM, mild pharyngeal erythema. TMs without posterior fluid, non-erythematous, not bulging. Ear canals appear normal. Turbinates without erythema/pallor.   Neck: Supple, no JVD, no bruits.  CV: RRR no m/r/g.   Pulm: CTAB no w/r/r  GI: Soft, NT, ND.   Neuro: Grossly intact  Msk: No lower extremity edema.  Skin: No concerning lesions.  Psychiatric: Normal affect and insight. Does not appear anxious or depressed.    Assessment    ICD-10-CM    1. Cough R05 albuterol (PROAIR HFA/PROVENTIL HFA/VENTOLIN HFA) 108 (90 Base) MCG/ACT inhaler     Respiratory Therapy Supplies ([employer] HOLDING CHAMBER/MASK) " NICO   2. History of tobacco use disorder Z87.891 albuterol (PROAIR HFA/PROVENTIL HFA/VENTOLIN HFA) 108 (90 Base) MCG/ACT inhaler     Respiratory Therapy Supplies ([employer]ING CHAMBER/MASK) NICO Parry is a 73 year old male with a PMH notable for HTN, GERD, and DUNIA on CPAP who presents for 2 days of concerns of having the flu, with chills, rhinorrhea, myalgias, cough, and fatigue. Exam reassuring. Upon further questioning this issue seems to be recurring often for several years and given Jared's smoking history there is concern for underlying COPD. On interview it seems that he has had previous PFTs but these weren't found in the chart. We suspect these will show a COPD pattern and that he may benefit from inhaler/nebulizor medications, however if previous tests cannot be obtained he should acquire PFTs here. Given the benign exam and expense of maintenance inhalers we recommend albuterol for now, and returning to us or Dr. Agrawal once his PFT results have been obtained. If he worsens we would consider antibiotic treatment and a PO steroid burst. Other etiologies could include recurrent viral pharyngitis, worsening allergic rhinitis, pulmonary malignancy, others.     Plan   -- Expected clinical course discussed   -- Medications and their side effects discussed    Patient Instructions    -- Obtain PFT reports and studies from [facility]   -- Use albuterol with holding chamber as needed for cough   -- Follow-up with Dr. Agrawal when breathing test results are back to discuss      Return if symptoms worsen or fail to improve.    Boris Muller MS4  [employer]      Attestation Statement:  I was present with the medical student who participated in the service and in the documentation of this note. I have verified the history and personally performed the physical exam and medical decision making, and have verified the content of the note which accurately reflects my assessment  of the patient and the plan of care.    Signed, Papo Mo MD  Internal Medicine & Pediatrics  6/4/2019 9:28 AM

## 2019-06-03 NOTE — PATIENT INSTRUCTIONS
-- Obtain PFT reports and studies from [facility]   -- Use albuterol with holding chamber as needed for cough   -- Follow-up with Dr. Agrawal when breathing test results are back to discuss

## 2019-06-03 NOTE — NURSING NOTE
Patient presents to clinic for cough and body aches that started last week.  Mily Collazo LPN ....................  6/3/2019   10:47 AM      No LMP for male patient.  Medication Reconciliation: complete    Mily Collazo LPN  6/3/2019 10:47 AM

## 2019-06-04 ENCOUNTER — TELEPHONE (OUTPATIENT)
Dept: INTERNAL MEDICINE | Facility: OTHER | Age: 74
End: 2019-06-04

## 2019-06-04 DIAGNOSIS — J45.20 MILD INTERMITTENT ASTHMA, UNCOMPLICATED: Primary | ICD-10-CM

## 2019-06-04 ASSESSMENT — ANXIETY QUESTIONNAIRES: GAD7 TOTAL SCORE: 0

## 2019-06-04 NOTE — TELEPHONE ENCOUNTER
Called and spoke with patient after proper verification. Patient would like to see a pulmonologist in [place]. Ref has been T'd up. Please review and sign.   Vivienne Borges LPN............. June 4, 2019 9:21 AM

## 2019-06-10 ENCOUNTER — TRANSFERRED RECORDS (OUTPATIENT)
Dept: HEALTH INFORMATION MANAGEMENT | Facility: OTHER | Age: 74
End: 2019-06-10

## 2019-06-11 ENCOUNTER — TRANSFERRED RECORDS (OUTPATIENT)
Dept: HEALTH INFORMATION MANAGEMENT | Facility: OTHER | Age: 74
End: 2019-06-11

## 2019-06-26 ENCOUNTER — OFFICE VISIT (OUTPATIENT)
Dept: INTERNAL MEDICINE | Facility: OTHER | Age: 74
End: 2019-06-26
Attending: INTERNAL MEDICINE
Payer: COMMERCIAL

## 2019-06-26 VITALS
BODY MASS INDEX: 24.71 KG/M2 | RESPIRATION RATE: 18 BRPM | DIASTOLIC BLOOD PRESSURE: 82 MMHG | WEIGHT: 166.8 LBS | HEIGHT: 69 IN | HEART RATE: 76 BPM | TEMPERATURE: 97.3 F | SYSTOLIC BLOOD PRESSURE: 130 MMHG

## 2019-06-26 DIAGNOSIS — N42.9 DISORDER OF PROSTATE: ICD-10-CM

## 2019-06-26 DIAGNOSIS — J45.20 MILD INTERMITTENT ASTHMA WITHOUT COMPLICATION: ICD-10-CM

## 2019-06-26 DIAGNOSIS — G47.33 OSA ON CPAP: ICD-10-CM

## 2019-06-26 DIAGNOSIS — E78.5 HYPERLIPIDEMIA, MILD: ICD-10-CM

## 2019-06-26 DIAGNOSIS — N39.41 URGE INCONTINENCE OF URINE: ICD-10-CM

## 2019-06-26 DIAGNOSIS — K21.9 GASTROESOPHAGEAL REFLUX DISEASE WITHOUT ESOPHAGITIS: ICD-10-CM

## 2019-06-26 DIAGNOSIS — I10 ESSENTIAL HYPERTENSION: ICD-10-CM

## 2019-06-26 DIAGNOSIS — I70.212 ATHEROSCLEROSIS OF NATIVE ARTERY OF LEFT LOWER EXTREMITY WITH INTERMITTENT CLAUDICATION (H): ICD-10-CM

## 2019-06-26 DIAGNOSIS — I70.212 ATHEROSCLEROSIS OF NATIVE ARTERY OF LEFT LOWER EXTREMITY WITH INTERMITTENT CLAUDICATION (H): Primary | ICD-10-CM

## 2019-06-26 LAB
ALBUMIN SERPL-MCNC: 4.3 G/DL (ref 3.5–5.7)
ALP SERPL-CCNC: 67 U/L (ref 34–104)
ALT SERPL W P-5'-P-CCNC: 19 U/L (ref 7–52)
ANION GAP SERPL CALCULATED.3IONS-SCNC: 7 MMOL/L (ref 3–14)
AST SERPL W P-5'-P-CCNC: 20 U/L (ref 13–39)
BILIRUB SERPL-MCNC: 0.4 MG/DL (ref 0.3–1)
BUN SERPL-MCNC: 23 MG/DL (ref 7–25)
CALCIUM SERPL-MCNC: 9.4 MG/DL (ref 8.6–10.3)
CHLORIDE SERPL-SCNC: 96 MMOL/L (ref 98–107)
CHOLEST SERPL-MCNC: 127 MG/DL
CO2 SERPL-SCNC: 29 MMOL/L (ref 21–31)
CREAT SERPL-MCNC: 0.89 MG/DL (ref 0.7–1.3)
GFR SERPL CREATININE-BSD FRML MDRD: 84 ML/MIN/{1.73_M2}
GLUCOSE SERPL-MCNC: 97 MG/DL (ref 70–105)
HDLC SERPL-MCNC: 52 MG/DL (ref 23–92)
LDLC SERPL CALC-MCNC: 50 MG/DL
NONHDLC SERPL-MCNC: 75 MG/DL
POTASSIUM SERPL-SCNC: 3.9 MMOL/L (ref 3.5–5.1)
PROT SERPL-MCNC: 7 G/DL (ref 6.4–8.9)
PSA SERPL-MCNC: 0.53 NG/ML
SODIUM SERPL-SCNC: 132 MMOL/L (ref 134–144)
TRIGL SERPL-MCNC: 123 MG/DL

## 2019-06-26 PROCEDURE — G0463 HOSPITAL OUTPT CLINIC VISIT: HCPCS

## 2019-06-26 PROCEDURE — 80061 LIPID PANEL: CPT | Mod: ZL | Performed by: INTERNAL MEDICINE

## 2019-06-26 PROCEDURE — 99215 OFFICE O/P EST HI 40 MIN: CPT | Performed by: INTERNAL MEDICINE

## 2019-06-26 PROCEDURE — 80053 COMPREHEN METABOLIC PANEL: CPT | Mod: ZL | Performed by: INTERNAL MEDICINE

## 2019-06-26 PROCEDURE — 84153 ASSAY OF PSA TOTAL: CPT | Mod: ZL | Performed by: INTERNAL MEDICINE

## 2019-06-26 PROCEDURE — 36415 COLL VENOUS BLD VENIPUNCTURE: CPT | Mod: ZL | Performed by: INTERNAL MEDICINE

## 2019-06-26 RX ORDER — LOSARTAN POTASSIUM AND HYDROCHLOROTHIAZIDE 12.5; 5 MG/1; MG/1
1 TABLET ORAL DAILY
Qty: 90 TABLET | Refills: 3 | Status: SHIPPED | OUTPATIENT
Start: 2019-06-26 | End: 2020-01-08

## 2019-06-26 RX ORDER — CLOPIDOGREL BISULFATE 75 MG/1
75 TABLET ORAL DAILY
Qty: 90 TABLET | Refills: 3 | Status: SHIPPED | OUTPATIENT
Start: 2019-06-26 | End: 2020-07-28

## 2019-06-26 RX ORDER — DIPHENHYDRAMINE HCL 25 MG
25 TABLET ORAL DAILY
COMMUNITY
Start: 2019-06-26 | End: 2020-01-08

## 2019-06-26 RX ORDER — ATORVASTATIN CALCIUM 20 MG/1
20 TABLET, FILM COATED ORAL DAILY
Qty: 90 TABLET | Refills: 3 | Status: SHIPPED | OUTPATIENT
Start: 2019-06-26 | End: 2020-09-14

## 2019-06-26 RX ORDER — CETIRIZINE HYDROCHLORIDE 10 MG/1
10 TABLET ORAL DAILY
COMMUNITY
Start: 2019-06-26 | End: 2020-01-08

## 2019-06-26 ASSESSMENT — ENCOUNTER SYMPTOMS
NERVOUS/ANXIOUS: 0
HEMATURIA: 0
SLEEP DISTURBANCE: 0
PALPITATIONS: 0
DYSURIA: 0
AGITATION: 0
ABDOMINAL DISTENTION: 0
WEAKNESS: 0
BLOOD IN STOOL: 0
TROUBLE SWALLOWING: 0
COUGH: 0
DIFFICULTY URINATING: 0
SORE THROAT: 0
APPETITE CHANGE: 0
CONSTIPATION: 0
DIZZINESS: 0
HEADACHES: 0
ADENOPATHY: 0
BACK PAIN: 0
DIARRHEA: 0
CHILLS: 0
CONFUSION: 0
FEVER: 0
SHORTNESS OF BREATH: 1
BRUISES/BLEEDS EASILY: 0
UNEXPECTED WEIGHT CHANGE: 0
SINUS PAIN: 0
WOUND: 0
HALLUCINATIONS: 0
EYE REDNESS: 0
CHEST TIGHTNESS: 0
ABDOMINAL PAIN: 0
NUMBNESS: 0
EYE PAIN: 0
JOINT SWELLING: 0
FLANK PAIN: 0
RHINORRHEA: 0
FATIGUE: 0
SEIZURES: 0
WHEEZING: 0
DIAPHORESIS: 0
NECK PAIN: 0
VOICE CHANGE: 0
FREQUENCY: 0
NECK STIFFNESS: 0
LIGHT-HEADEDNESS: 0
VOMITING: 0
SINUS PRESSURE: 0
SPEECH DIFFICULTY: 0
NAUSEA: 0
TREMORS: 0

## 2019-06-26 ASSESSMENT — ANXIETY QUESTIONNAIRES
1. FEELING NERVOUS, ANXIOUS, OR ON EDGE: NOT AT ALL
7. FEELING AFRAID AS IF SOMETHING AWFUL MIGHT HAPPEN: NOT AT ALL
2. NOT BEING ABLE TO STOP OR CONTROL WORRYING: NOT AT ALL
6. BECOMING EASILY ANNOYED OR IRRITABLE: NOT AT ALL
3. WORRYING TOO MUCH ABOUT DIFFERENT THINGS: NOT AT ALL
5. BEING SO RESTLESS THAT IT IS HARD TO SIT STILL: NOT AT ALL
GAD7 TOTAL SCORE: 0
IF YOU CHECKED OFF ANY PROBLEMS ON THIS QUESTIONNAIRE, HOW DIFFICULT HAVE THESE PROBLEMS MADE IT FOR YOU TO DO YOUR WORK, TAKE CARE OF THINGS AT HOME, OR GET ALONG WITH OTHER PEOPLE: NOT DIFFICULT AT ALL

## 2019-06-26 ASSESSMENT — PATIENT HEALTH QUESTIONNAIRE - PHQ9
5. POOR APPETITE OR OVEREATING: NOT AT ALL
SUM OF ALL RESPONSES TO PHQ QUESTIONS 1-9: 0

## 2019-06-26 ASSESSMENT — MIFFLIN-ST. JEOR: SCORE: 1489.04

## 2019-06-26 NOTE — PROGRESS NOTES
Chief Complaint:  This patient is here for a comprehensive review of their multiple medical problems, renewal of medications and update on necessary health maintenance issues.    HPI: This patient is here today for complete evaluation and a review of his chronic medical problems.  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  He was on Xarelto but his surgeon this winter changed him to Plavix in place of the Xarelto.  I am not sure what the exact reason for that was, but obviously it was felt not to be thrombotic in nature.  He is doing well with his and with the medication so we will not make a change at this time.    The patient has a history of hypertension.  He is on single drug therapy for this.  He has good control of his blood pressure and he tolerates this medication without difficulty.  He also has mild hyperlipidemia.  He is on moderate intensity statin therapy which he tolerates without difficulty.  He is due for recheck on his lab in this respect.    He has a history of some mild reflux disease.  He is on ranitidine for that.  He has obstructive sleep apnea and uses CPAP on a regular daily basis.  He has a history of mild intermittent asthma versus COPD.  He will be seeing pulmonary next month for further review including spirometry.  He was on Advair but he was recently changed to Breo because his insurance would no longer pay for the Advair.  He rarely needs to use his rescue inhaler.  He admits that allergies really do bother him quite a bit and make this worse.  For about 10 years he actually did very well and did not have much for problems.    He takes diphenhydramine on a daily basis for urinary incontinence.    Medications are reconciled.  Past medical history, past surgical history, family history and social history reviewed and updated.  Immunizations are up-to-date, [facility]  Colon cancer screening up-to-date,  colonoscopy will be due in 2022.    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    On plavix per vascular surgeon     Enlarged prostate without lower urinary tract symptoms (luts)     No Comments Provided     Essential (primary) hypertension     No Comments Provided     GERD (gastroesophageal reflux disease)      Hyperlipidemia     No Comments Provided     DUNIA on CPAP        Past Surgical History:   Procedure Laterality Date     APPENDECTOMY OPEN      No Comments Provided     AS REMOVAL OF KIDNEY STONE  2016     COLONOSCOPY  01/25/2017    Adenomatous, f/u in 2022     CYSTOSCOPY, TRANSURETHRAL RESECTION (TUR) PROSTATE, COMBINED      No Comments Provided     EMBOLECTOMY LOWER EXTREMITY  2017    Popliteal     HERNIORRHAPHY INGUINAL BILATERAL      No Comments Provided     OPEN REDUCTION INTERNAL FIXATION FEMUR DISTAL         Current Outpatient Medications   Medication Sig Dispense Refill     albuterol (PROAIR HFA/PROVENTIL HFA/VENTOLIN HFA) 108 (90 Base) MCG/ACT inhaler Inhale 2 puffs into the lungs every 4 hours as needed for shortness of breath / dyspnea or wheezing 8.5 g 11     atorvastatin (LIPITOR) 20 MG tablet Take 1 tablet by mouth daily       cetirizine (ZYRTEC) 10 MG tablet Take 1 tablet (10 mg) by mouth daily       clopidogrel (PLAVIX) 75 MG tablet Take 1 tablet (75 mg) by mouth daily 90 tablet 3     diphenhydrAMINE (BENADRYL) 25 MG tablet Take 1 tablet (25 mg) by mouth daily       fluticasone-vilanterol (BREO ELLIPTA) 200-25 MCG/INH inhaler Inhale 1 puff into the lungs daily 3 Inhaler 3     losartan-hydrochlorothiazide (HYZAAR) 50-12.5 MG tablet TAKE 1 TABLET BY MOUTH EVERY DAY 90 tablet 0     ranitidine (ZANTAC) 150 MG tablet Take 1 tablet by mouth 2 times daily    "      Allergies   Allergen Reactions     Aspirin      Other reaction(s): GI Upset     Cephalexin Hives     Thiopental      Other reaction(s): *Unknown - Childhood Rxn  Patient states \"almost  from Pentothal as child\"       Family History   Problem Relation Age of Onset     Heart Disease Father          at age 56 of an MI     Hypertension Father      Hyperlipidemia Father      Substance Abuse Father         alcohol use     Diabetes Mother      Heart Disease Mother      Hypertension Brother      Heart Disease Brother         Heart Disease,CAD starting in his late 50s     Hyperlipidemia Brother      Other - See Comments Brother         obesity     Diabetes Brother      Hypertension Brother      Heart Disease Brother      Hyperlipidemia Brother      Other - See Comments Brother         obesity     Diabetes Brother      Hypertension Brother      Heart Disease Brother      Prostate Cancer Brother      Other - See Comments Sister          in her mid 60's.  Viral cardiomyopathy,     Diabetes Sister      Arthritis Sister      Hypertension Sister      Hyperlipidemia Sister        Social History     Socioeconomic History     Marital status:      Spouse name: Not on file     Number of children: Not on file     Years of education: Not on file     Highest education level: Not on file   Occupational History     Not on file   Social Needs     Financial resource strain: Not on file     Food insecurity:     Worry: Not on file     Inability: Not on file     Transportation needs:     Medical: Not on file     Non-medical: Not on file   Tobacco Use     Smoking status: Former Smoker     Packs/day: 1.00     Years: 30.00     Pack years: 30.00     Types: Cigarettes     Last attempt to quit: 1983     Years since quittin.8     Smokeless tobacco: Former User     Quit date: 1986   Substance and Sexual Activity     Alcohol use: No     Alcohol/week: 0.0 oz     Drug use: No     Comment: Drug use: No     " Sexual activity: Yes   Lifestyle     Physical activity:     Days per week: Not on file     Minutes per session: Not on file     Stress: Not on file   Relationships     Social connections:     Talks on phone: Not on file     Gets together: Not on file     Attends Restoration service: Not on file     Active member of club or organization: Not on file     Attends meetings of clubs or organizations: Not on file     Relationship status: Not on file     Intimate partner violence:     Fear of current or ex partner: Not on file     Emotionally abused: Not on file     Physically abused: Not on file     Forced sexual activity: Not on file   Other Topics Concern     Not on file   Social History Narrative     with two daughters from a previous marriage and five grandchildren.  Retired as a .  Lives with his wife on [address] with wife Samantha in [place].       Review of Systems   Constitutional: Negative for appetite change, chills, diaphoresis, fatigue, fever and unexpected weight change.   HENT: Negative for ear pain, rhinorrhea, sinus pressure, sinus pain, sore throat, trouble swallowing and voice change.    Eyes: Negative for pain, redness and visual disturbance.   Respiratory: Positive for shortness of breath. Negative for cough, chest tightness and wheezing.    Cardiovascular: Negative for chest pain, palpitations and leg swelling.   Gastrointestinal: Negative for abdominal distention, abdominal pain, blood in stool, constipation, diarrhea, nausea and vomiting.   Endocrine: Negative for cold intolerance and heat intolerance.   Genitourinary: Negative for difficulty urinating, dysuria, flank pain, frequency and hematuria.   Musculoskeletal: Negative for back pain, joint swelling, neck pain and neck stiffness.   Skin: Negative for rash and wound.   Allergic/Immunologic: Negative for immunocompromised state.   Neurological: Negative for dizziness, tremors, seizures, syncope, speech difficulty,  weakness, light-headedness, numbness and headaches.   Hematological: Negative for adenopathy. Does not bruise/bleed easily.   Psychiatric/Behavioral: Negative for agitation, behavioral problems, confusion, hallucinations and sleep disturbance. The patient is not nervous/anxious.        Physical Exam   Constitutional: He is oriented to person, place, and time. He appears well-developed and well-nourished. No distress.   HENT:   Head: Normocephalic.   Right Ear: External ear normal.   Left Ear: External ear normal.   Nose: Nose normal.   Mouth/Throat: Oropharynx is clear and moist. No oropharyngeal exudate.   Eyes: Pupils are equal, round, and reactive to light. Conjunctivae are normal.   Neck: Normal range of motion. Neck supple. Normal carotid pulses and no JVD present. Carotid bruit is not present. No tracheal deviation present. No thyromegaly present.   Cardiovascular: Normal rate, regular rhythm and normal heart sounds. Exam reveals no gallop and no friction rub.   No murmur heard.  Pulmonary/Chest: Effort normal and breath sounds normal. No stridor. No respiratory distress. He has no wheezes. He has no rales.   Abdominal: Soft. Bowel sounds are normal. He exhibits no distension and no mass. There is no tenderness. There is no rebound and no guarding. No hernia.   Genitourinary: Rectum normal, prostate normal and penis normal.   Musculoskeletal: Normal range of motion. He exhibits no edema.   Lymphadenopathy:     He has no cervical adenopathy.   Neurological: He is alert and oriented to person, place, and time. He displays normal reflexes. No cranial nerve deficit or sensory deficit. He exhibits normal muscle tone. Coordination normal.   Skin: Skin is warm and dry. No rash noted. He is not diaphoretic.   Psychiatric: He has a normal mood and affect.   Nursing note and vitals reviewed.      Assessment:      ICD-10-CM    1. 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 DUNIA on CPAP G47.33     Z99.89    3. Gastroesophageal reflux disease without esophagitis K21.9    4. Hyperlipidemia, mild E78.5    5. Essential hypertension I10         Plan: Overall he appears to be doing well at this time.  Medications will continue without change, refills were done as necessary.  Lab is pending, I will send him a letter with the results.  All other health maintenance issues are up-to-date.  He will have an appointment with pulmonary medicine in a month or so including spirometry and to review.  I recommended that for the time being he continue with his inhalers and his Zyrtec daily.  If all goes well, follow-up with me on an annual basis.

## 2019-06-26 NOTE — LETTER
My Asthma Action Plan  Name: Jared Parry   YOB: 1945  Date: 6/26/2019   My doctor: DONNIE GARCIA MD   My clinic: [facility]        My Control Medicine: Fluticasone furoate + vilanterol (Breo Ellipta)-  100/25mcg daily  My Rescue Medicine: Albuterol (Proair/Ventolin/Proventil) inhaler Q 4 hours prn   My Asthma Severity: intermittent  Avoid your asthma triggers: pollens               GREEN ZONE   Good Control    I feel good    No cough or wheeze    Can work, sleep and play without asthma symptoms       Take your asthma control medicine every day.     1. If exercise triggers your asthma, take your rescue medication    15 minutes before exercise or sports, and    During exercise if you have asthma symptoms  2. Spacer to use with inhaler: If you have a spacer, make sure to use it with your inhaler             YELLOW ZONE Getting Worse  I have ANY of these:    I do not feel good    Cough or wheeze    Chest feels tight    Wake up at night   1. Keep taking your Green Zone medications  2. Start taking your rescue medicine:    every 20 minutes for up to 1 hour. Then every 4 hours for 24-48 hours.  3. If you stay in the Yellow Zone for more than 12-24 hours, contact your doctor.  4. If you do not return to the Green Zone in 12-24 hours or you get worse, start taking your oral steroid medicine if prescribed by your provider.           RED ZONE Medical Alert - Get Help  I have ANY of these:    I feel awful    Medicine is not helping    Breathing getting harder    Trouble walking or talking    Nose opens wide to breathe       1. Take your rescue medicine NOW  2. If your provider has prescribed an oral steroid medicine, start taking it NOW  3. Call your doctor NOW  4. If you are still in the Red Zone after 20 minutes and you have not reached your doctor:    Take your rescue medicine again and    Call 911 or go to the emergency room right away    See your regular doctor within 2 weeks of  an Emergency Room or Urgent Care visit for follow-up treatment.          Annual Reminders:  Meet with Asthma Educator,  Flu Shot in the Fall, consider Pneumonia Vaccination for patients with asthma (aged 19 and older).    Pharmacy:    [id number] - [place], [place] - [address]  [id number] - [place], [place] - [address] [facility] [address] [facility]                      Asthma Triggers  How To Control Things That Make Your Asthma Worse    Triggers are things that make your asthma worse.  Look at the list below to help you find your triggers and what you can do about them.  You can help prevent asthma flare-ups by staying away from your triggers.      Trigger                                                          What you can do   Cigarette Smoke  Tobacco smoke can make asthma worse. Do not allow smoking in your home, car or around you.  Be sure no one smokes at a child s day care or school.  If you smoke, ask your health care provider for ways to help you quit.  Ask family members to quit too.  Ask your health care provider for a referral to Quit Plan to help you quit smoking, or call 8-908-153-PLAN.     Colds, Flu, Bronchitis  These are common triggers of asthma. Wash your hands often.  Don t touch your eyes, nose or mouth.  Get a flu shot every year.     Dust Mites  These are tiny bugs that live in cloth or carpet. They are too small to see. Wash sheets and blankets in hot water every week.   Encase pillows and mattress in dust mite proof covers.  Avoid having carpet if you can. If you have carpet, vacuum weekly.   Use a dust mask and HEPA vacuum.   Pollen and Outdoor Mold  Some people are allergic to trees, grass, or weed pollen, or molds. Try to keep your windows closed.  Limit time out doors when pollen count is high.   Ask you health care provider about taking medicine during allergy season.     Animal Dander  Some people are allergic  to skin flakes, urine or saliva from pets with fur or feathers. Keep pets with fur or feathers out of your home.    If you can t keep the pet outdoors, then keep the pet out of your bedroom.  Keep the bedroom door closed.  Keep pets off cloth furniture and away from stuffed toys.     Mice, Rats, and Cockroaches  Some people are allergic to the waste from these pests.   Cover food and garbage.  Clean up spills and food crumbs.  Store grease in the refrigerator.   Keep food out of the bedroom.   Indoor Mold  This can be a trigger if your home has high moisture. Fix leaking faucets, pipes, or other sources of water.   Clean moldy surfaces.  Dehumidify basement if it is damp and smelly.   Smoke, Strong Odors, and Sprays  These can reduce air quality. Stay away from strong odors and sprays, such as perfume, powder, hair spray, paints, smoke incense, paint, cleaning products, candles and new carpet.   Exercise or Sports  Some people with asthma have this trigger. Be active!  Ask your doctor about taking medicine before sports or exercise to prevent symptoms.    Warm up for 5-10 minutes before and after sports or exercise.     Other Triggers of Asthma  Cold air:  Cover your nose and mouth with a scarf.  Sometimes laughing or crying can be a trigger.  Some medicines and food can trigger asthma.

## 2019-06-26 NOTE — NURSING NOTE
"The patient is here today to get a refill on his medications.  Dahiana Alexis LPN on 6/26/2019 at 1:26 PM  Chief Complaint   Patient presents with     Recheck Medication       Initial /82 (BP Location: Right arm, Patient Position: Sitting, Cuff Size: Adult Regular)   Pulse 76   Temp 97.3  F (36.3  C) (Tympanic)   Resp 18   Ht 1.74 m (5' 8.5\")   Wt 75.7 kg (166 lb 12.8 oz)   BMI 24.99 kg/m   Estimated body mass index is 24.99 kg/m  as calculated from the following:    Height as of this encounter: 1.74 m (5' 8.5\").    Weight as of this encounter: 75.7 kg (166 lb 12.8 oz).  Medication Reconciliation: complete    Dahiana Alexis LPN    "

## 2019-06-27 ASSESSMENT — ASTHMA QUESTIONNAIRES: ACT_TOTALSCORE: 15

## 2019-06-27 ASSESSMENT — ANXIETY QUESTIONNAIRES: GAD7 TOTAL SCORE: 0

## 2019-07-08 ENCOUNTER — TELEPHONE (OUTPATIENT)
Dept: INTERNAL MEDICINE | Facility: OTHER | Age: 74
End: 2019-07-08

## 2019-07-08 DIAGNOSIS — J45.20 MILD INTERMITTENT ASTHMA, UNCOMPLICATED: ICD-10-CM

## 2019-07-08 NOTE — TELEPHONE ENCOUNTER
Contacted the patient he stated that [facility]o [facility]a [facility]. He needs a prescription printed and faxed to susan frankel at [phone].  Dahiana Alexis LPN on 7/8/2019 at 1:40 PM

## 2019-07-17 DIAGNOSIS — J45.20 MILD INTERMITTENT ASTHMA, UNCOMPLICATED: Primary | ICD-10-CM

## 2019-07-22 ENCOUNTER — MEDICAL CORRESPONDENCE (OUTPATIENT)
Dept: HEALTH INFORMATION MANAGEMENT | Facility: OTHER | Age: 74
End: 2019-07-22

## 2019-07-22 ENCOUNTER — TRANSFERRED RECORDS (OUTPATIENT)
Dept: HEALTH INFORMATION MANAGEMENT | Facility: OTHER | Age: 74
End: 2019-07-22

## 2019-08-02 ENCOUNTER — HOSPITAL ENCOUNTER (OUTPATIENT)
Dept: RESPIRATORY THERAPY | Facility: OTHER | Age: 74
Discharge: HOME | End: 2019-08-02
Attending: HOSPITALIST | Admitting: FAMILY MEDICINE
Payer: MEDICARE

## 2019-08-02 DIAGNOSIS — R06.00 DYSPNEA: Primary | ICD-10-CM

## 2019-08-02 PROCEDURE — 94726 PLETHYSMOGRAPHY LUNG VOLUMES: CPT | Mod: 26 | Performed by: INTERNAL MEDICINE

## 2019-08-02 PROCEDURE — 94729 DIFFUSING CAPACITY: CPT

## 2019-08-02 PROCEDURE — 94010 BREATHING CAPACITY TEST: CPT

## 2019-08-02 PROCEDURE — 94726 PLETHYSMOGRAPHY LUNG VOLUMES: CPT

## 2019-08-02 PROCEDURE — 40000275 ZZH STATISTIC RCP TIME EA 10 MIN

## 2019-08-02 PROCEDURE — 94729 DIFFUSING CAPACITY: CPT | Mod: 26 | Performed by: INTERNAL MEDICINE

## 2019-08-02 PROCEDURE — 94010 BREATHING CAPACITY TEST: CPT | Mod: 26 | Performed by: INTERNAL MEDICINE

## 2019-08-08 ENCOUNTER — TRANSFERRED RECORDS (OUTPATIENT)
Dept: HEALTH INFORMATION MANAGEMENT | Facility: OTHER | Age: 74
End: 2019-08-08

## 2019-11-06 ENCOUNTER — TELEPHONE (OUTPATIENT)
Dept: INTERNAL MEDICINE | Facility: OTHER | Age: 74
End: 2019-11-06

## 2019-11-06 DIAGNOSIS — I10 ESSENTIAL HYPERTENSION: ICD-10-CM

## 2019-11-06 DIAGNOSIS — I10 ESSENTIAL HYPERTENSION: Primary | ICD-10-CM

## 2019-11-06 RX ORDER — HYDROCHLOROTHIAZIDE 12.5 MG/1
25 TABLET ORAL DAILY
Qty: 90 TABLET | Refills: 1 | Status: SHIPPED | OUTPATIENT
Start: 2019-11-06 | End: 2019-11-06

## 2019-11-06 RX ORDER — LOSARTAN POTASSIUM 50 MG/1
50 TABLET ORAL DAILY
Qty: 90 TABLET | Refills: 1 | Status: SHIPPED | OUTPATIENT
Start: 2019-11-06 | End: 2020-09-29

## 2019-11-06 NOTE — TELEPHONE ENCOUNTER
[facility] pharmacy is calling looking for two different prescriptions to be sent in for the patients hyzaar. As they are not able to get the combination medication.  Dahiana Alexis LPN on 11/6/2019 at 12:31 PM

## 2019-11-06 NOTE — TELEPHONE ENCOUNTER
Yanna from [facility] in [place], [place] called needing to discuss a prescription that they received for the patient.  Please call [phone] to discuss and advise.  Joanne Velasquez on 11/6/2019 at 12:11 PM

## 2019-11-11 RX ORDER — HYDROCHLOROTHIAZIDE 12.5 MG/1
TABLET ORAL
Qty: 180 TABLET | Refills: 3 | Status: SHIPPED | OUTPATIENT
Start: 2019-11-11 | End: 2020-01-08

## 2019-11-11 NOTE — TELEPHONE ENCOUNTER
Routing refill request to provider for review/approval because:  Labs out of range:  Sodium    LOV: 6/26/19  Cherri Flores RN on 11/11/2019 at 7:57 AM

## 2020-01-08 ENCOUNTER — OFFICE VISIT (OUTPATIENT)
Dept: INTERNAL MEDICINE | Facility: OTHER | Age: 75
End: 2020-01-08
Attending: INTERNAL MEDICINE
Payer: COMMERCIAL

## 2020-01-08 VITALS
HEART RATE: 80 BPM | WEIGHT: 166.4 LBS | BODY MASS INDEX: 24.93 KG/M2 | TEMPERATURE: 96.8 F | SYSTOLIC BLOOD PRESSURE: 136 MMHG | RESPIRATION RATE: 18 BRPM | DIASTOLIC BLOOD PRESSURE: 82 MMHG

## 2020-01-08 DIAGNOSIS — F51.01 PRIMARY INSOMNIA: ICD-10-CM

## 2020-01-08 DIAGNOSIS — I10 ESSENTIAL HYPERTENSION: ICD-10-CM

## 2020-01-08 DIAGNOSIS — F41.1 GAD (GENERALIZED ANXIETY DISORDER): Primary | ICD-10-CM

## 2020-01-08 PROCEDURE — G0463 HOSPITAL OUTPT CLINIC VISIT: HCPCS

## 2020-01-08 PROCEDURE — 99214 OFFICE O/P EST MOD 30 MIN: CPT | Performed by: INTERNAL MEDICINE

## 2020-01-08 RX ORDER — BUSPIRONE HYDROCHLORIDE 15 MG/1
15 TABLET ORAL 2 TIMES DAILY
COMMUNITY
Start: 2020-01-05 | End: 2020-01-08

## 2020-01-08 RX ORDER — MONTELUKAST SODIUM 10 MG/1
TABLET ORAL
Refills: 2 | COMMUNITY
Start: 2019-10-21 | End: 2020-01-08

## 2020-01-08 RX ORDER — HYDROCHLOROTHIAZIDE 12.5 MG/1
12.5 TABLET ORAL DAILY
Qty: 180 TABLET | Refills: 3 | COMMUNITY
Start: 2020-01-08 | End: 2020-09-29

## 2020-01-08 RX ORDER — BUSPIRONE HYDROCHLORIDE 15 MG/1
15 TABLET ORAL 2 TIMES DAILY
Qty: 180 TABLET | Refills: 3 | Status: SHIPPED | OUTPATIENT
Start: 2020-01-08 | End: 2020-07-16

## 2020-01-08 RX ORDER — FAMOTIDINE 10 MG
10 TABLET ORAL 2 TIMES DAILY
COMMUNITY
Start: 2020-01-08 | End: 2022-10-10

## 2020-01-08 RX ORDER — MONTELUKAST SODIUM 10 MG/1
10 TABLET ORAL AT BEDTIME
Refills: 2 | COMMUNITY
Start: 2020-01-08 | End: 2020-07-20

## 2020-01-08 RX ORDER — ZOLPIDEM TARTRATE 5 MG/1
5 TABLET ORAL
COMMUNITY
Start: 2020-01-08 | End: 2020-07-16

## 2020-01-08 RX ORDER — TRAZODONE HYDROCHLORIDE 50 MG/1
50 TABLET, FILM COATED ORAL AT BEDTIME
Qty: 15 TABLET | Refills: 1 | Status: SHIPPED | OUTPATIENT
Start: 2020-01-08 | End: 2020-07-16

## 2020-01-08 ASSESSMENT — ENCOUNTER SYMPTOMS
ALLERGIC/IMMUNOLOGIC NEGATIVE: 1
ENDOCRINE NEGATIVE: 1
CONSTITUTIONAL NEGATIVE: 1
HEMATOLOGIC/LYMPHATIC NEGATIVE: 1

## 2020-01-08 ASSESSMENT — ANXIETY QUESTIONNAIRES
2. NOT BEING ABLE TO STOP OR CONTROL WORRYING: SEVERAL DAYS
3. WORRYING TOO MUCH ABOUT DIFFERENT THINGS: SEVERAL DAYS
5. BEING SO RESTLESS THAT IT IS HARD TO SIT STILL: SEVERAL DAYS
6. BECOMING EASILY ANNOYED OR IRRITABLE: NOT AT ALL
1. FEELING NERVOUS, ANXIOUS, OR ON EDGE: SEVERAL DAYS
7. FEELING AFRAID AS IF SOMETHING AWFUL MIGHT HAPPEN: SEVERAL DAYS
IF YOU CHECKED OFF ANY PROBLEMS ON THIS QUESTIONNAIRE, HOW DIFFICULT HAVE THESE PROBLEMS MADE IT FOR YOU TO DO YOUR WORK, TAKE CARE OF THINGS AT HOME, OR GET ALONG WITH OTHER PEOPLE: SOMEWHAT DIFFICULT
GAD7 TOTAL SCORE: 6

## 2020-01-08 ASSESSMENT — PATIENT HEALTH QUESTIONNAIRE - PHQ9: 5. POOR APPETITE OR OVEREATING: SEVERAL DAYS

## 2020-01-08 NOTE — PROGRESS NOTES
Chief Complaint:  F/U on anxiety.    HPI: This patient comes in today for follow-up.  He was in [place] and he had all kinds of problems.  He had significant anxiety.  He was started on BuSpar 10 mg twice daily which helped but not adequately.  His dose was increased to 15 mg twice daily and this helped substantially.  He is now on that same dose.  He wonders if he can go off of that.    He also was having some insomnia.  He will take Tylenol PM or Benadryl but on occasion he will take Ambien 5 mg nightly.  I reviewed with him the questionable safety of Ambien.  He certainly does not use many of them but he would be interested in trying something different for sleep as there are some nights when he cannot sleep very well at all.    I reconciled his medications today.  He is no longer on any inhalers.  The Singulair seems to be doing very well for his breathing and his allergies.  Of note is that he was having some back pain when he was in [place].  An MRI of his lumbar spine revealed that he had a right-sided L5 disc impingement.    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    On plavix per vascular surgeon     Enlarged prostate without lower urinary tract symptoms (luts)     No Comments Provided     Essential (primary) hypertension     No Comments Provided     GERD (gastroesophageal reflux disease)      Hyperlipidemia     No Comments Provided     DUNIA on CPAP        Past Surgical History:   Procedure Laterality Date     APPENDECTOMY OPEN      No Comments Provided     AS REMOVAL OF KIDNEY STONE  2016     COLONOSCOPY  01/25/2017    Adenomatous, f/u in 2022     CYSTOSCOPY, TRANSURETHRAL RESECTION (TUR) PROSTATE, COMBINED      No Comments Provided     EMBOLECTOMY LOWER EXTREMITY  2017  "   Popliteal     HERNIORRHAPHY INGUINAL BILATERAL      No Comments Provided     OPEN REDUCTION INTERNAL FIXATION FEMUR DISTAL         Allergies   Allergen Reactions     Aspirin      Other reaction(s): GI Upset     Cephalexin Hives     Thiopental      Other reaction(s): *Unknown - Childhood Rxn  Patient states \"almost  from Pentothal as child\"       Current Outpatient Medications   Medication Sig Dispense Refill     atorvastatin (LIPITOR) 20 MG tablet Take 1 tablet (20 mg) by mouth daily 90 tablet 3     busPIRone (BUSPAR) 15 MG tablet Take 1 tablet (15 mg) by mouth 2 times daily 180 tablet 3     clopidogrel (PLAVIX) 75 MG tablet Take 1 tablet (75 mg) by mouth daily 90 tablet 3     famotidine (PEPCID) 10 MG tablet Take 1 tablet (10 mg) by mouth 2 times daily       hydrochlorothiazide (HYDRODIURIL) 12.5 MG tablet Take 1 tablet (12.5 mg) by mouth daily 180 tablet 3     losartan (COZAAR) 50 MG tablet Take 1 tablet (50 mg) by mouth daily 90 tablet 1     montelukast (SINGULAIR) 10 MG tablet Take 1 tablet (10 mg) by mouth At Bedtime  2     traZODone (DESYREL) 50 MG tablet Take 1 tablet (50 mg) by mouth At Bedtime 15 tablet 1     zolpidem (AMBIEN) 5 MG tablet Take 1 tablet (5 mg) by mouth nightly as needed for sleep         Social History     Socioeconomic History     Marital status:      Spouse name: Not on file     Number of children: Not on file     Years of education: Not on file     Highest education level: Not on file   Occupational History     Not on file   Social Needs     Financial resource strain: Not on file     Food insecurity:     Worry: Not on file     Inability: Not on file     Transportation needs:     Medical: Not on file     Non-medical: Not on file   Tobacco Use     Smoking status: Former Smoker     Packs/day: 1.00     Years: 30.00     Pack years: 30.00     Types: Cigarettes     Last attempt to quit: 1983     Years since quittin.3     Smokeless tobacco: Former User     Quit date: " 9/11/1986   Substance and Sexual Activity     Alcohol use: No     Alcohol/week: 0.0 standard drinks     Drug use: No     Comment: Drug use: No     Sexual activity: Yes   Lifestyle     Physical activity:     Days per week: Not on file     Minutes per session: Not on file     Stress: Not on file   Relationships     Social connections:     Talks on phone: Not on file     Gets together: Not on file     Attends Yazdanism service: Not on file     Active member of club or organization: Not on file     Attends meetings of clubs or organizations: Not on file     Relationship status: Not on file     Intimate partner violence:     Fear of current or ex partner: Not on file     Emotionally abused: Not on file     Physically abused: Not on file     Forced sexual activity: Not on file   Other Topics Concern     Not on file   Social History Narrative     with two daughters from a previous marriage and five grandchildren.  Retired as a .  Lives with his wife on [address] with wife Samantha in [place].       Review of Systems   Constitutional: Negative.    Endocrine: Negative.    Skin: Negative.    Allergic/Immunologic: Negative.    Hematological: Negative.        Physical Exam  Vitals signs and nursing note reviewed.   Constitutional:       General: He is not in acute distress.     Appearance: Normal appearance. He is not ill-appearing, toxic-appearing or diaphoretic.   Neurological:      Mental Status: He is alert.   Psychiatric:      Comments: Slightly anxious         Assessment:      ICD-10-CM    1. JALYN (generalized anxiety disorder) F41.1 busPIRone (BUSPAR) 15 MG tablet   2. Essential hypertension I10 hydrochlorothiazide (HYDRODIURIL) 12.5 MG tablet   3. Primary insomnia F51.01 traZODone (DESYREL) 50 MG tablet       Plan: I encouraged him to stay on the BuSpar on a more regular basis.  At some point in time if he wants to try to wean off of this he could.  He is going to get some counseling  through [facility] which I encouraged.  Trial of trazodone 50 mg nightly as needed for sleep.  Warned of possible side effects.  Medications are otherwise reconciled.  He will follow-up in [place] as they plan to go back down there again in a couple of weeks but if he has problems prior to that he will return to see me.  Over 50% of a 25-minute visit spent in counseling and coordination of care.

## 2020-01-08 NOTE — NURSING NOTE
"The patient is here today to discuss his anxiety.  Dahiana Alexis LPN on 1/8/2020 at 12:34 PM  Chief Complaint   Patient presents with     Anxiety       Initial /82 (BP Location: Right arm, Patient Position: Sitting, Cuff Size: Adult Regular)   Pulse 80   Temp 96.8  F (36  C) (Tympanic)   Resp 18   Wt 75.5 kg (166 lb 6.4 oz)   BMI 24.93 kg/m   Estimated body mass index is 24.93 kg/m  as calculated from the following:    Height as of 6/26/19: 1.74 m (5' 8.5\").    Weight as of this encounter: 75.5 kg (166 lb 6.4 oz).  Medication Reconciliation: complete    Dahiana Alexis LPN    "

## 2020-01-09 ASSESSMENT — ANXIETY QUESTIONNAIRES: GAD7 TOTAL SCORE: 6

## 2020-02-25 ENCOUNTER — TRANSFERRED RECORDS (OUTPATIENT)
Dept: HEALTH INFORMATION MANAGEMENT | Facility: OTHER | Age: 75
End: 2020-02-25

## 2020-03-11 ENCOUNTER — HEALTH MAINTENANCE LETTER (OUTPATIENT)
Age: 75
End: 2020-03-11

## 2020-04-29 ENCOUNTER — TRANSFERRED RECORDS (OUTPATIENT)
Dept: HEALTH INFORMATION MANAGEMENT | Facility: OTHER | Age: 75
End: 2020-04-29

## 2020-05-27 DIAGNOSIS — M25.512 LEFT SHOULDER PAIN, UNSPECIFIED CHRONICITY: Primary | ICD-10-CM

## 2020-06-02 ENCOUNTER — OFFICE VISIT (OUTPATIENT)
Dept: ORTHOPEDICS | Facility: OTHER | Age: 75
End: 2020-06-02
Attending: ORTHOPAEDIC SURGERY
Payer: COMMERCIAL

## 2020-06-02 ENCOUNTER — HOSPITAL ENCOUNTER (OUTPATIENT)
Dept: GENERAL RADIOLOGY | Facility: OTHER | Age: 75
End: 2020-06-02
Attending: ORTHOPAEDIC SURGERY
Payer: COMMERCIAL

## 2020-06-02 VITALS — DIASTOLIC BLOOD PRESSURE: 70 MMHG | SYSTOLIC BLOOD PRESSURE: 126 MMHG | HEART RATE: 68 BPM

## 2020-06-02 DIAGNOSIS — M25.512 CHRONIC LEFT SHOULDER PAIN: Primary | ICD-10-CM

## 2020-06-02 DIAGNOSIS — M25.512 LEFT SHOULDER PAIN, UNSPECIFIED CHRONICITY: ICD-10-CM

## 2020-06-02 DIAGNOSIS — G89.29 CHRONIC LEFT SHOULDER PAIN: Primary | ICD-10-CM

## 2020-06-02 PROCEDURE — 99203 OFFICE O/P NEW LOW 30 MIN: CPT | Performed by: ORTHOPAEDIC SURGERY

## 2020-06-02 PROCEDURE — G0463 HOSPITAL OUTPT CLINIC VISIT: HCPCS

## 2020-06-02 PROCEDURE — 73030 X-RAY EXAM OF SHOULDER: CPT | Mod: LT

## 2020-06-02 NOTE — PROGRESS NOTES
Patient is here for consult on his left shoulder pain.   Niki Ramos LPN .....................6/2/2020 1:27 PM

## 2020-06-03 NOTE — PROGRESS NOTES
Visit Date:   06/02/2020      REASON FOR EVALUATION:  Left shoulder pain.      HISTORY:  Jared comes in with regards to his left shoulder.  He has had pain here kind of on and for up to 5 years.  It has become more frequent over the past year or so.  He has never had an injection done.  He is here for x-rays today, and that was completed, and I did review that, which did reveal a moderate amount of shoulder arthritis.  He reports that swimming does exacerbate it.  Since he has not been swimming as much lately since COVID and pools being closed, his shoulder is actually starting to feel much better for him.  He wanted to look at working through the process at this point in time.  The patient denies any other major concerns.  Reports pain with reaching out, reaching across, occasional nighttime discomfort.  His pain is very minimal right now and is actually not causing much distress for him.  He is unable to take anti-inflammatories secondary to being on Plavix and just simply wanted to explore his options moving forward.      MEDICATIONS:  Reviewed and as noted in the chart.      ALLERGIES:  PRESENT TO ASPIRIN, KEFLEX AS WELL AS THIOPENTAL.      REVIEW OF SYSTEMS: 12 point review of systems is otherwise negative with the exception as stated above.      PHYSICAL EXAMINATION:  The patient is 5 feet 8 inches, 166 pounds, alert and oriented x 3, cooperative with exam, in no acute distress.  Does ambulate with satisfactory gait.  Affect appropriate.  Examination of the left shoulder shows full range of motion.  Mild crepitation with extreme extension as well as abduction.  Overall rotator cuff strength with abduction and internal strength testing as well as external is adequate at this point in time.  No instability is otherwise seen.  Minimal swelling is seen otherwise.  Radial pulse otherwise 2+ in addition to that.  Spurling testing also negative as well at this point in time.      IMAGING:  X-rays obtained here  today, 4 views, do show moderate glenohumeral joint arthrosis plus spur formation and type 2 acromion.      IMPRESSION:  Left shoulder pain secondary to glenohumeral joint arthrosis.      PLAN:  At this time, we talked about modifying activities, probably limiting some of his swimming at this point.  We did discuss potentially looking at an injection down the road.  We are going to wait at this point in time.  If symptoms do progress significantly, certainly we would like to have that completed down the road.  The patient is in agreement with that plan, and he will keep me posted in regards to those potential needs, and his long-term management would include the potential need for joint reconstruction if symptoms continue to progress.         BRANDON RICH MD             D: 2020   T: 2020   MT: JOÃO      Name:     HANG STEIN   MRN:      [id number]        Account:      [id number]   :      1945           Visit Date:   2020      Document: [id number]

## 2020-07-15 ENCOUNTER — OFFICE VISIT (OUTPATIENT)
Dept: ORTHOPEDICS | Facility: OTHER | Age: 75
End: 2020-07-15
Attending: ORTHOPAEDIC SURGERY
Payer: COMMERCIAL

## 2020-07-15 DIAGNOSIS — G89.29 CHRONIC LEFT SHOULDER PAIN: Primary | ICD-10-CM

## 2020-07-15 DIAGNOSIS — M25.512 CHRONIC LEFT SHOULDER PAIN: Primary | ICD-10-CM

## 2020-07-15 PROCEDURE — 99212 OFFICE O/P EST SF 10 MIN: CPT | Mod: 25 | Performed by: ORTHOPAEDIC SURGERY

## 2020-07-15 PROCEDURE — G0463 HOSPITAL OUTPT CLINIC VISIT: HCPCS | Mod: 25

## 2020-07-15 PROCEDURE — 25000128 H RX IP 250 OP 636: Performed by: ORTHOPAEDIC SURGERY

## 2020-07-15 PROCEDURE — 25000125 ZZHC RX 250: Performed by: ORTHOPAEDIC SURGERY

## 2020-07-15 PROCEDURE — 20610 DRAIN/INJ JOINT/BURSA W/O US: CPT | Mod: LT | Performed by: ORTHOPAEDIC SURGERY

## 2020-07-15 PROCEDURE — G0463 HOSPITAL OUTPT CLINIC VISIT: HCPCS

## 2020-07-15 RX ORDER — TRIAMCINOLONE ACETONIDE 40 MG/ML
40 INJECTION, SUSPENSION INTRA-ARTICULAR; INTRAMUSCULAR ONCE
Status: COMPLETED | OUTPATIENT
Start: 2020-07-15 | End: 2020-07-15

## 2020-07-15 RX ORDER — LIDOCAINE HYDROCHLORIDE 10 MG/ML
4 INJECTION, SOLUTION EPIDURAL; INFILTRATION; INTRACAUDAL; PERINEURAL ONCE
Status: COMPLETED | OUTPATIENT
Start: 2020-07-15 | End: 2020-07-15

## 2020-07-15 RX ADMIN — TRIAMCINOLONE ACETONIDE 40 MG: 40 INJECTION, SUSPENSION INTRA-ARTICULAR; INTRAMUSCULAR at 12:55

## 2020-07-15 RX ADMIN — LIDOCAINE HYDROCHLORIDE 4 ML: 10 INJECTION, SOLUTION EPIDURAL; INFILTRATION; INTRACAUDAL; PERINEURAL at 12:55

## 2020-07-15 NOTE — PROGRESS NOTES
Patient is here for follow up on his left shoulder pain.   Niki Ramos LPN .....................7/15/2020 12:51 PM

## 2020-07-16 NOTE — PROGRESS NOTES
Visit Date:   07/15/2020      REASON FOR EVALUATION:  Left shoulder pain.      HISTORY:  Jared comes in with regards to left shoulder.  He is here for an injection at this point in time.  He has had shoulder arthritis.  He is very active, enjoys swimming, swimming does seem to bother him.  He has backed down on that a little bit, but still has continuation of pain and wants to consider an injection at this point.      PHYSICAL EXAMINATION:  Left shoulder shows forward elevation at 160-170, external rotation about 15 degrees with his arm at the side, internal rotation to hip level.  Overall strength is adequate.  Does have some irritation with impingement testing.  Neurovascular examination is otherwise intact.      PROCEDURE:  Following informed consent and sterile preparation, the patient's left shoulder glenohumeral joint was injected with 4 mL of 1% lidocaine and 40 mg of Kenalog under sterile conditions.  The patient did tolerate the procedure well.      IMPRESSION:  Left shoulder glenohumeral joint arthrosis.      PLAN:  Injection as stated above.  Repeat in the future as appropriate and necessary at this time.  Long-term management joint reconstruction potentially.         BRANDON RICH MD             D: 07/15/2020   T: 07/15/2020   MT: TAY      Name:     JARED STEIN   MRN:      [id number]        Account:      [id number]   :      1945           Visit Date:   07/15/2020      Document: [id number]

## 2020-07-20 ENCOUNTER — TELEPHONE (OUTPATIENT)
Dept: INTERNAL MEDICINE | Facility: OTHER | Age: 75
End: 2020-07-20

## 2020-07-20 DIAGNOSIS — J45.20 MILD INTERMITTENT ASTHMA, UNCOMPLICATED: Primary | ICD-10-CM

## 2020-07-20 RX ORDER — MONTELUKAST SODIUM 10 MG/1
10 TABLET ORAL AT BEDTIME
Qty: 90 TABLET | Refills: 1 | Status: SHIPPED | OUTPATIENT
Start: 2020-07-20 | End: 2020-09-29

## 2020-07-20 NOTE — TELEPHONE ENCOUNTER
After verifying patient name and date of birth, patient asked for a renewal of a prescription for montelukast. Patient asking if he needs to have an appointment.   Sangeeta Colmenares on 7/20/2020 at 1:47 PM

## 2020-07-26 DIAGNOSIS — I70.212 ATHEROSCLEROSIS OF NATIVE ARTERY OF LEFT LOWER EXTREMITY WITH INTERMITTENT CLAUDICATION (H): ICD-10-CM

## 2020-07-28 RX ORDER — CLOPIDOGREL BISULFATE 75 MG/1
TABLET ORAL
Qty: 90 TABLET | Refills: 1 | Status: SHIPPED | OUTPATIENT
Start: 2020-07-28 | End: 2020-09-29

## 2020-07-28 NOTE — TELEPHONE ENCOUNTER
Routing refill request to provider for review/approval because:   Normal HGB on file in past 12 months Protocol Details    Normal Platelets on file in past 12 months          LOV: 1/8/2020  Cherri Flores RN on 7/28/2020 at 3:33 PM

## 2020-09-08 ENCOUNTER — TELEPHONE (OUTPATIENT)
Dept: FAMILY MEDICINE | Facility: OTHER | Age: 75
End: 2020-09-08

## 2020-09-10 ENCOUNTER — APPOINTMENT (OUTPATIENT)
Dept: FAMILY MEDICINE | Facility: OTHER | Age: 75
End: 2020-09-10
Attending: CHIROPRACTOR

## 2020-09-10 ENCOUNTER — RESULTS ONLY (OUTPATIENT)
Dept: LAB | Age: 75
End: 2020-09-10

## 2020-09-10 ENCOUNTER — APPOINTMENT (OUTPATIENT)
Dept: LAB | Facility: OTHER | Age: 75
End: 2020-09-10
Attending: CHIROPRACTOR

## 2020-09-10 LAB
ALBUMIN SERPL-MCNC: 4.3 G/DL (ref 3.5–5.7)
ALBUMIN UR-MCNC: NEGATIVE MG/DL
ALP SERPL-CCNC: 70 U/L (ref 34–104)
ALT SERPL W P-5'-P-CCNC: 20 U/L (ref 7–52)
ANION GAP SERPL CALCULATED.3IONS-SCNC: 5 MMOL/L (ref 3–14)
APPEARANCE UR: CLEAR
AST SERPL W P-5'-P-CCNC: 21 U/L (ref 13–39)
BILIRUB SERPL-MCNC: 0.6 MG/DL (ref 0.3–1)
BILIRUB UR QL STRIP: NEGATIVE
BUN SERPL-MCNC: 22 MG/DL (ref 7–25)
CALCIUM SERPL-MCNC: 9.5 MG/DL (ref 8.6–10.3)
CHLORIDE SERPL-SCNC: 99 MMOL/L (ref 98–107)
CHOLEST SERPL-MCNC: 129 MG/DL
CO2 SERPL-SCNC: 32 MMOL/L (ref 21–31)
COLLECT DATE SP2 STL: NORMAL
COLLECT DATE SP3 STL: NORMAL
COLLECT DATE STL: NORMAL
COLOR UR AUTO: YELLOW
CREAT SERPL-MCNC: 0.96 MG/DL (ref 0.7–1.3)
GFR SERPL CREATININE-BSD FRML MDRD: 77 ML/MIN/{1.73_M2}
GLUCOSE SERPL-MCNC: 101 MG/DL (ref 70–105)
GLUCOSE UR STRIP-MCNC: NEGATIVE MG/DL
HDLC SERPL-MCNC: 49 MG/DL (ref 23–92)
HEMOCCULT SP1 STL QL: NEGATIVE
HEMOCCULT SP2 STL QL: NEGATIVE
HEMOCCULT SP3 STL QL: NEGATIVE
HGB UR QL STRIP: NEGATIVE
KETONES UR STRIP-MCNC: NEGATIVE MG/DL
LDLC SERPL CALC-MCNC: 67 MG/DL
LEUKOCYTE ESTERASE UR QL STRIP: NEGATIVE
NITRATE UR QL: NEGATIVE
NONHDLC SERPL-MCNC: 80 MG/DL
PH UR STRIP: 7 PH (ref 5–7)
POTASSIUM SERPL-SCNC: 4.2 MMOL/L (ref 3.5–5.1)
PROT SERPL-MCNC: 7 G/DL (ref 6.4–8.9)
PSA SERPL-ACNC: 0.23 NG/ML
SODIUM SERPL-SCNC: 136 MMOL/L (ref 134–144)
SOURCE: NORMAL
SP GR UR STRIP: 1.02 (ref 1–1.03)
TRIGL SERPL-MCNC: 67 MG/DL
UROBILINOGEN UR STRIP-MCNC: NORMAL MG/DL (ref 0–2)

## 2020-09-10 PROCEDURE — 99499 UNLISTED E&M SERVICE: CPT | Performed by: CHIROPRACTOR

## 2020-09-13 DIAGNOSIS — I70.212 ATHEROSCLEROSIS OF NATIVE ARTERY OF LEFT LOWER EXTREMITY WITH INTERMITTENT CLAUDICATION (H): ICD-10-CM

## 2020-09-14 RX ORDER — ATORVASTATIN CALCIUM 20 MG/1
TABLET, FILM COATED ORAL
Qty: 90 TABLET | Refills: 0 | Status: SHIPPED | OUTPATIENT
Start: 2020-09-14 | End: 2020-09-29

## 2020-09-14 NOTE — TELEPHONE ENCOUNTER
[facility] sent Rx request for the following: atorvastatin (LIPITOR) 20 MG tablet  Sig TAKE 1 TABLET(20 MG) BY MOUTH DAILY    Last Prescription Date:   6/26/19  Last Fill Qty/Refills:         90, R-3    Last Office Visit:              1/8/20   Future Office visit:           9/29/20    Prescription refilled per RN Medication Refill Policy.................... Sophia Melendez RN ....................  9/14/2020   4:10 PM

## 2020-09-24 ENCOUNTER — TELEPHONE (OUTPATIENT)
Dept: FAMILY MEDICINE | Facility: OTHER | Age: 75
End: 2020-09-24

## 2020-09-24 NOTE — TELEPHONE ENCOUNTER
Patient was wondering if his [employer] results were mailed. Informed him that they were mailed out this week.  Ayleen Flores LPN  9/24/2020 1:47 PM

## 2020-09-24 NOTE — TELEPHONE ENCOUNTER
Reason for call: Request for results.    Name of test or procedure: Blood Work     Date of test or procedure: ?    Location of test or procedure: [facility]    Preferred method for responding to this message: Telephone Call    Phone number patient can be reached at: Cell number on file:    Telephone Information:   Mobile [phone]       If we can't reach you directly, may we leave a detailed response at the number you provided?Yes

## 2020-09-29 ENCOUNTER — OFFICE VISIT (OUTPATIENT)
Dept: INTERNAL MEDICINE | Facility: OTHER | Age: 75
End: 2020-09-29
Attending: INTERNAL MEDICINE
Payer: COMMERCIAL

## 2020-09-29 VITALS
HEART RATE: 72 BPM | WEIGHT: 162 LBS | BODY MASS INDEX: 24.55 KG/M2 | SYSTOLIC BLOOD PRESSURE: 110 MMHG | TEMPERATURE: 97.7 F | RESPIRATION RATE: 18 BRPM | DIASTOLIC BLOOD PRESSURE: 76 MMHG | HEIGHT: 68 IN

## 2020-09-29 DIAGNOSIS — F41.1 GAD (GENERALIZED ANXIETY DISORDER): ICD-10-CM

## 2020-09-29 DIAGNOSIS — J45.20 MILD INTERMITTENT ASTHMA WITHOUT COMPLICATION: ICD-10-CM

## 2020-09-29 DIAGNOSIS — I10 ESSENTIAL HYPERTENSION: ICD-10-CM

## 2020-09-29 DIAGNOSIS — J45.20 MILD INTERMITTENT ASTHMA, UNCOMPLICATED: ICD-10-CM

## 2020-09-29 DIAGNOSIS — E78.5 HYPERLIPIDEMIA, MILD: ICD-10-CM

## 2020-09-29 DIAGNOSIS — G47.33 OSA ON CPAP: ICD-10-CM

## 2020-09-29 DIAGNOSIS — Z86.0100 HISTORY OF COLONIC POLYPS: ICD-10-CM

## 2020-09-29 DIAGNOSIS — I70.212 ATHEROSCLEROSIS OF NATIVE ARTERY OF LEFT LOWER EXTREMITY WITH INTERMITTENT CLAUDICATION (H): Primary | ICD-10-CM

## 2020-09-29 DIAGNOSIS — K21.9 GASTROESOPHAGEAL REFLUX DISEASE WITHOUT ESOPHAGITIS: ICD-10-CM

## 2020-09-29 PROCEDURE — G0463 HOSPITAL OUTPT CLINIC VISIT: HCPCS

## 2020-09-29 PROCEDURE — G0008 ADMIN INFLUENZA VIRUS VAC: HCPCS

## 2020-09-29 PROCEDURE — G0463 HOSPITAL OUTPT CLINIC VISIT: HCPCS | Mod: 25

## 2020-09-29 PROCEDURE — 99215 OFFICE O/P EST HI 40 MIN: CPT | Performed by: INTERNAL MEDICINE

## 2020-09-29 PROCEDURE — 90662 IIV NO PRSV INCREASED AG IM: CPT

## 2020-09-29 PROCEDURE — 90471 IMMUNIZATION ADMIN: CPT

## 2020-09-29 RX ORDER — LOSARTAN POTASSIUM 50 MG/1
50 TABLET ORAL DAILY
Qty: 90 TABLET | Refills: 3 | Status: ON HOLD | OUTPATIENT
Start: 2020-09-29 | End: 2021-07-05

## 2020-09-29 RX ORDER — CYCLOSPORINE 0.5 MG/ML
1 EMULSION OPHTHALMIC 2 TIMES DAILY
COMMUNITY
Start: 2020-09-29 | End: 2021-07-13

## 2020-09-29 RX ORDER — CLOPIDOGREL BISULFATE 75 MG/1
75 TABLET ORAL DAILY
Qty: 90 TABLET | Refills: 3 | Status: SHIPPED | OUTPATIENT
Start: 2020-09-29 | End: 2021-07-13

## 2020-09-29 RX ORDER — MONTELUKAST SODIUM 10 MG/1
10 TABLET ORAL AT BEDTIME
Qty: 90 TABLET | Refills: 3 | Status: SHIPPED | OUTPATIENT
Start: 2020-09-29 | End: 2021-10-06

## 2020-09-29 RX ORDER — ATORVASTATIN CALCIUM 20 MG/1
20 TABLET, FILM COATED ORAL DAILY
Qty: 90 TABLET | Refills: 3 | Status: SHIPPED | OUTPATIENT
Start: 2020-09-29 | End: 2021-07-13

## 2020-09-29 RX ORDER — HYDROCHLOROTHIAZIDE 12.5 MG/1
12.5 TABLET ORAL DAILY
Qty: 90 TABLET | Refills: 3 | Status: ON HOLD | OUTPATIENT
Start: 2020-09-29 | End: 2021-07-05

## 2020-09-29 ASSESSMENT — ENCOUNTER SYMPTOMS
UNEXPECTED WEIGHT CHANGE: 0
TROUBLE SWALLOWING: 0
ADENOPATHY: 0
EYE REDNESS: 0
NECK PAIN: 0
FATIGUE: 0
CHILLS: 0
HEADACHES: 0
WHEEZING: 0
DIARRHEA: 0
SEIZURES: 0
NERVOUS/ANXIOUS: 0
HEMATURIA: 0
DIAPHORESIS: 0
SPEECH DIFFICULTY: 0
DIZZINESS: 0
SHORTNESS OF BREATH: 0
ABDOMINAL PAIN: 0
SINUS PAIN: 0
RHINORRHEA: 0
FLANK PAIN: 0
VOICE CHANGE: 0
DIFFICULTY URINATING: 0
SINUS PRESSURE: 0
WOUND: 0
BACK PAIN: 0
CHEST TIGHTNESS: 0
NUMBNESS: 0
WEAKNESS: 0
NECK STIFFNESS: 0
LIGHT-HEADEDNESS: 0
SLEEP DISTURBANCE: 0
DYSURIA: 0
AGITATION: 0
BRUISES/BLEEDS EASILY: 0
CONSTIPATION: 0
HALLUCINATIONS: 0
EYE PAIN: 0
VOMITING: 0
NAUSEA: 0
PALPITATIONS: 0
FEVER: 0
JOINT SWELLING: 0
CONFUSION: 0
APPETITE CHANGE: 0
BLOOD IN STOOL: 0
COUGH: 0
SORE THROAT: 0
TREMORS: 0
FREQUENCY: 0
ABDOMINAL DISTENTION: 0

## 2020-09-29 ASSESSMENT — MIFFLIN-ST. JEOR: SCORE: 1446.39

## 2020-09-29 ASSESSMENT — PAIN SCALES - GENERAL: PAINLEVEL: NO PAIN (0)

## 2020-09-29 NOTE — NURSING NOTE
"Chief Complaint   Patient presents with     Physical       Initial /76 (BP Location: Right arm, Patient Position: Sitting, Cuff Size: Adult Regular)   Pulse 72   Temp 97.7  F (36.5  C) (Tympanic)   Resp 18   Ht 1.715 m (5' 7.5\")   Wt 73.5 kg (162 lb)   BMI 25.00 kg/m   Estimated body mass index is 25 kg/m  as calculated from the following:    Height as of this encounter: 1.715 m (5' 7.5\").    Weight as of this encounter: 73.5 kg (162 lb).  Medication Reconciliation: complete    Zayra Sevilla LPN  "

## 2020-09-29 NOTE — PROGRESS NOTES
Chief Complaint:  This patient is here for a comprehensive review of their multiple medical problems, renewal of medications and update on necessary health maintenance issues.      HPI: He is here today for complete evaluation and a review of his chronic medical problems.  He has a history of hypertension.  He is on 2 drug therapy for control of his hypertension.  He tolerates the medications without difficulty.  He also has a history of hyperlipidemia.  He is on moderate intensity statin therapy which he tolerates without side effect or problem.  He does have a remote history of leg embolism with previous embolectomy.  This was done down [facility].  The vascular surgeon that he saw there put him on Plavix and wants him to remain on Plavix lifelong.  He has no further circulatory issues or concerns.    He does have a history of some mild intermittent asthma.  He takes Singulair daily with good control and he needs nothing further.  He has obstructive sleep apnea and uses CPAP on a nightly basis with good results.  He does have a history of some anxiety as well as a history of insomnia.  We have tried him on previous medications including trazodone and BuSpar without any significant benefit or success.  He does not feel that he needs anything else.    He recently had a complete evaluation through Dr. Castellon.  He had lab work at that time including a PSA.  Everything was normal.  Past medical history, past surgical history, family history and social histories are reviewed and updated.  Immunizations up-to-date other than a flu shot.  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    On plavix per vascular surgeon     Enlarged prostate without lower  "urinary tract symptoms (luts)     No Comments Provided     Essential (primary) hypertension     No Comments Provided     GERD (gastroesophageal reflux disease)      Hyperlipidemia     No Comments Provided     DUNIA on CPAP        Past Surgical History:   Procedure Laterality Date     APPENDECTOMY OPEN      No Comments Provided     ARTHROSCOPY KNEE Left      AS REMOVAL OF KIDNEY STONE  2016     COLONOSCOPY  2017    Adenomatous, f/u in      CYSTOSCOPY, TRANSURETHRAL RESECTION (TUR) PROSTATE, COMBINED      No Comments Provided     EMBOLECTOMY LOWER EXTREMITY  2017    Popliteal     HERNIORRHAPHY INGUINAL BILATERAL      No Comments Provided     OPEN REDUCTION INTERNAL FIXATION FEMUR DISTAL Left        Current Outpatient Medications   Medication Sig Dispense Refill     atorvastatin (LIPITOR) 20 MG tablet TAKE 1 TABLET(20 MG) BY MOUTH DAILY 90 tablet 0     clopidogrel (PLAVIX) 75 MG tablet TAKE 1 TABLET(75 MG) BY MOUTH DAILY 90 tablet 1     cycloSPORINE (RESTASIS) 0.05 % ophthalmic emulsion Place 1 drop into both eyes 2 times daily       famotidine (PEPCID) 10 MG tablet Take 1 tablet (10 mg) by mouth 2 times daily       hydrochlorothiazide (HYDRODIURIL) 12.5 MG tablet Take 1 tablet (12.5 mg) by mouth daily 180 tablet 3     losartan (COZAAR) 50 MG tablet Take 1 tablet (50 mg) by mouth daily 90 tablet 1     montelukast (SINGULAIR) 10 MG tablet Take 1 tablet (10 mg) by mouth At Bedtime 90 tablet 1       Allergies   Allergen Reactions     Aspirin      Other reaction(s): GI Upset     Cephalexin Hives     Thiopental      Other reaction(s): *Unknown - Childhood Rxn  Patient states \"almost  from Pentothal as child\"       Family History   Problem Relation Age of Onset     Heart Disease Father          at age 56 of an MI     Hypertension Father      Hyperlipidemia Father      Substance Abuse Father         alcohol use     Diabetes Mother      Heart Disease Mother      Hypertension Brother      Heart Disease " Brother         Heart Disease,CAD starting in his late 50s     Hyperlipidemia Brother      Other - See Comments Brother         obesity     Diabetes Brother      Hypertension Brother      Heart Disease Brother      Hyperlipidemia Brother      Other - See Comments Brother         obesity     Diabetes Brother      Alzheimer Disease Brother      Hypertension Brother      Heart Disease Brother      Prostate Cancer Brother      Other - See Comments Sister          in her mid 60's.  Viral cardiomyopathy,     Diabetes Sister      Arthritis Sister      Hypertension Sister      Hyperlipidemia Sister        Social History     Socioeconomic History     Marital status:      Spouse name: Not on file     Number of children: Not on file     Years of education: Not on file     Highest education level: Not on file   Occupational History     Not on file   Social Needs     Financial resource strain: Not on file     Food insecurity     Worry: Not on file     Inability: Not on file     Transportation needs     Medical: Not on file     Non-medical: Not on file   Tobacco Use     Smoking status: Former Smoker     Packs/day: 1.00     Years: 30.00     Pack years: 30.00     Types: Cigarettes     Last attempt to quit: 1983     Years since quittin.0     Smokeless tobacco: Former User     Quit date: 1986   Substance and Sexual Activity     Alcohol use: No     Alcohol/week: 0.0 standard drinks     Drug use: No     Comment: Drug use: No     Sexual activity: Yes   Lifestyle     Physical activity     Days per week: Not on file     Minutes per session: Not on file     Stress: Not on file   Relationships     Social connections     Talks on phone: Not on file     Gets together: Not on file     Attends Protestant service: Not on file     Active member of club or organization: Not on file     Attends meetings of clubs or organizations: Not on file     Relationship status: Not on file     Intimate partner violence     Fear of  current or ex partner: Not on file     Emotionally abused: Not on file     Physically abused: Not on file     Forced sexual activity: Not on file   Other Topics Concern     Not on file   Social History Narrative     with two daughters from a previous marriage and five grandchildren.  Retired as a .  Lives with his wife on [place] with wife Samantha in [place].       Review of Systems   Constitutional: Negative for appetite change, chills, diaphoresis, fatigue, fever and unexpected weight change.   HENT: Negative for ear pain, rhinorrhea, sinus pressure, sinus pain, sore throat, trouble swallowing and voice change.    Eyes: Negative for pain, redness and visual disturbance.   Respiratory: Negative for cough, chest tightness, shortness of breath and wheezing.    Cardiovascular: Negative for chest pain, palpitations and leg swelling.   Gastrointestinal: Negative for abdominal distention, abdominal pain, blood in stool, constipation, diarrhea, nausea and vomiting.   Endocrine: Negative for cold intolerance and heat intolerance.   Genitourinary: Negative for difficulty urinating, dysuria, flank pain, frequency and hematuria.   Musculoskeletal: Negative for back pain, joint swelling, neck pain and neck stiffness.   Skin: Negative for rash and wound.   Allergic/Immunologic: Negative for immunocompromised state.   Neurological: Negative for dizziness, tremors, seizures, syncope, speech difficulty, weakness, light-headedness, numbness and headaches.   Hematological: Negative for adenopathy. Does not bruise/bleed easily.   Psychiatric/Behavioral: Negative for agitation, behavioral problems, confusion, hallucinations and sleep disturbance. The patient is not nervous/anxious.        Physical Exam  Vitals signs and nursing note reviewed.   Constitutional:       General: He is not in acute distress.     Appearance: He is well-developed. He is not diaphoretic.   HENT:      Head: Normocephalic.       Right Ear: Tympanic membrane, ear canal and external ear normal.      Left Ear: Tympanic membrane, ear canal and external ear normal.      Nose: Nose normal.      Mouth/Throat:      Pharynx: No oropharyngeal exudate.   Eyes:      Conjunctiva/sclera: Conjunctivae normal.      Pupils: Pupils are equal, round, and reactive to light.   Neck:      Musculoskeletal: Normal range of motion and neck supple.      Thyroid: No thyroid mass or thyromegaly.      Vascular: Normal carotid pulses. No carotid bruit or JVD.      Trachea: No tracheal deviation.   Cardiovascular:      Rate and Rhythm: Normal rate and regular rhythm.      Heart sounds: Normal heart sounds. No murmur. No friction rub. No gallop.    Pulmonary:      Effort: Pulmonary effort is normal. No respiratory distress.      Breath sounds: Normal breath sounds. No stridor. No decreased breath sounds, wheezing, rhonchi or rales.   Abdominal:      General: Bowel sounds are normal. There is no distension.      Palpations: Abdomen is soft. There is no mass.      Tenderness: There is no abdominal tenderness. There is no guarding or rebound.      Hernia: No hernia is present.   Genitourinary:     Penis: Normal.       Scrotum/Testes: Normal.      Prostate: Normal.      Rectum: Normal.   Musculoskeletal: Normal range of motion.      Right lower leg: No edema.      Left lower leg: No edema.   Lymphadenopathy:      Cervical: No cervical adenopathy.   Skin:     General: Skin is warm and dry.      Findings: No rash.   Neurological:      Mental Status: He is alert and oriented to person, place, and time.      Cranial Nerves: No cranial nerve deficit.      Sensory: No sensory deficit.      Motor: No abnormal muscle tone.      Coordination: Coordination normal.      Deep Tendon Reflexes: Reflexes normal.         Assessment:      ICD-10-CM    1. 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 DUNIA on CPAP  G47.33     Z99.89     3. Gastroesophageal reflux disease without esophagitis  K21.9    4. Hyperlipidemia, mild  E78.5    5. Essential hypertension  I10    6. History of colonic polyps  Z86.010    7. JALYN (generalized anxiety disorder)  F41.1    8. Mild intermittent asthma without complication  J45.20         Plan: Overall he appears to be doing well.  Medications will continue without change and refills were done for 1 year.  He did not need any lab work as he recently had that done and it was reviewed with him.  Immunizations are up-to-date other than a flu shot which was provided today.  Assuming all goes well, he will follow-up on an annual basis or anytime sooner if there are problems.  Of note is that they no longer go to [place] for the winter and will likely spend his winter locally, possibly going to [place] when the coronavirus pandemic improves.

## 2020-11-03 ENCOUNTER — HOSPITAL ENCOUNTER (EMERGENCY)
Facility: OTHER | Age: 75
Discharge: HOME | End: 2020-11-03
Attending: EMERGENCY MEDICINE | Admitting: EMERGENCY MEDICINE
Payer: COMMERCIAL

## 2020-11-03 VITALS
SYSTOLIC BLOOD PRESSURE: 160 MMHG | BODY MASS INDEX: 25 KG/M2 | DIASTOLIC BLOOD PRESSURE: 89 MMHG | OXYGEN SATURATION: 97 % | RESPIRATION RATE: 19 BRPM | TEMPERATURE: 97.4 F | WEIGHT: 162 LBS | HEART RATE: 92 BPM

## 2020-11-03 DIAGNOSIS — R04.0 EPISTAXIS: ICD-10-CM

## 2020-11-03 PROCEDURE — 30901 CONTROL OF NOSEBLEED: CPT | Performed by: EMERGENCY MEDICINE

## 2020-11-03 PROCEDURE — 99282 EMERGENCY DEPT VISIT SF MDM: CPT | Mod: 25 | Performed by: EMERGENCY MEDICINE

## 2020-11-03 ASSESSMENT — ENCOUNTER SYMPTOMS
VOMITING: 0
LIGHT-HEADEDNESS: 0
NAUSEA: 0
DYSURIA: 0
CHEST TIGHTNESS: 0
SHORTNESS OF BREATH: 0
CHILLS: 0
FEVER: 0
ARTHRALGIAS: 0

## 2020-11-03 NOTE — ED TRIAGE NOTES
Patient presents to the ED with complaints of a bloody nose which he states began approximately 30 minutes prior to arrival.  Patient denies any recent trauma to his nose, however, patient states he is on blood thinners. Patient denies headache or dizziness.  Patient states he had another episode earlier this week in which his nose bled for approx 35 minutes.

## 2020-11-03 NOTE — ED AVS SNAPSHOT
[facility]  [address]  [place] [address]  Phone: [phone]  Fax: [phone]                                    Jared Parry   MRN: [id number]    Department: [facility]   Date of Visit: 11/3/2020           After Visit Summary Signature Page    I have received my discharge instructions, and my questions have been answered. I have discussed any challenges I see with this plan with the nurse or doctor.    ..........................................................................................................................................  Patient/Patient Representative Signature      ..........................................................................................................................................  Patient Representative Print Name and Relationship to Patient    ..................................................               ................................................  Date                                   Time    ..........................................................................................................................................  Reviewed by Signature/Title    ...................................................              ..............................................  Date                                               Time          22EPIC Rev 08/18

## 2020-11-03 NOTE — DISCHARGE INSTRUCTIONS
If this starts again, you could try a couple of sprays of some Douglas-Synephrine type nasal spray.  Do not use this more than allowed on the packaging.  If this continues, follow-up with ENT.

## 2020-11-03 NOTE — ED PROVIDER NOTES
"  History     Chief Complaint   Patient presents with     Epistaxis     HPI  Jared Parry is a 74 year old male who is here with a nosebleed.  He is on Plavix.  He uses a CPAP at night.  This  afternoon he had something in his nose and region there to gently pick at it.  Started a nosebleed.  This happened the other day as well and it bled quite a bit but he was able to get it to stop.  Today he could not get it to stop.  When I see him it has slowed down quite well.  Not feeling weak dizzy lightheaded.    Allergies:  Allergies   Allergen Reactions     Aspirin      Other reaction(s): GI Upset     Cephalexin Hives     Thiopental      Other reaction(s): *Unknown - Childhood Rxn  Patient states \"almost  from Pentothal as child\"       Problem List:    Patient Active Problem List    Diagnosis Date Noted     JALYN (generalized anxiety disorder) 2020     Priority: Medium     Mild intermittent asthma without complication 2019     Priority: Medium     History of tobacco use disorder 2019     Priority: Medium     Atherosclerosis of native artery of extremity with intermittent claudication (H) 06/15/2018     Priority: Medium     Attention deficit disorder 2018     Priority: Medium     History of colonic polyps 2018     Priority: Medium     Esophageal reflux 2018     Priority: Medium     Hyperlipidemia, mild 2018     Priority: Medium     Hypertension 2018     Priority: Medium     Nephrolithiasis 2018     Priority: Medium     DUNIA on CPAP 2018     Priority: Medium     Popliteal artery embolism, left (H) 2017     Priority: Medium        Past Medical History:    Past Medical History:   Diagnosis Date     Attention-deficit hyperactivity disorder      Benign neoplasm of colon      Calculus of kidney      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     Enlarged prostate without lower urinary tract symptoms (luts)      Essential (primary) " hypertension      GERD (gastroesophageal reflux disease)      Hyperlipidemia      DUNIA on CPAP        Past Surgical History:    Past Surgical History:   Procedure Laterality Date     APPENDECTOMY OPEN      No Comments Provided     ARTHROSCOPY KNEE Left      AS REMOVAL OF KIDNEY STONE  2016     COLONOSCOPY  2017    Adenomatous, f/u in      CYSTOSCOPY, TRANSURETHRAL RESECTION (TUR) PROSTATE, COMBINED      No Comments Provided     EMBOLECTOMY LOWER EXTREMITY  2017    Popliteal     HERNIORRHAPHY INGUINAL BILATERAL      No Comments Provided     OPEN REDUCTION INTERNAL FIXATION FEMUR DISTAL Left        Family History:    Family History   Problem Relation Age of Onset     Heart Disease Father          at age 56 of an MI     Hypertension Father      Hyperlipidemia Father      Substance Abuse Father         alcohol use     Diabetes Mother      Heart Disease Mother      Hypertension Brother      Heart Disease Brother         Heart Disease,CAD starting in his late 50s     Hyperlipidemia Brother      Other - See Comments Brother         obesity     Diabetes Brother      Hypertension Brother      Heart Disease Brother      Hyperlipidemia Brother      Other - See Comments Brother         obesity     Diabetes Brother      Alzheimer Disease Brother      Hypertension Brother      Heart Disease Brother      Prostate Cancer Brother      Other - See Comments Sister          in her mid 60's.  Viral cardiomyopathy,     Diabetes Sister      Arthritis Sister      Hypertension Sister      Hyperlipidemia Sister        Social History:  Marital Status:   [2]  Social History     Tobacco Use     Smoking status: Former Smoker     Packs/day: 1.00     Years: 30.00     Pack years: 30.00     Types: Cigarettes     Quit date: 1983     Years since quittin.1     Smokeless tobacco: Former User     Quit date: 1986   Substance Use Topics     Alcohol use: No     Alcohol/week: 0.0 standard drinks     Drug use: No      Comment: Drug use: No        Medications:         atorvastatin (LIPITOR) 20 MG tablet       clopidogrel (PLAVIX) 75 MG tablet       cycloSPORINE (RESTASIS) 0.05 % ophthalmic emulsion       famotidine (PEPCID) 10 MG tablet       hydrochlorothiazide (HYDRODIURIL) 12.5 MG tablet       losartan (COZAAR) 50 MG tablet       montelukast (SINGULAIR) 10 MG tablet          Review of Systems   Constitutional: Negative for chills and fever.   HENT: Positive for nosebleeds. Negative for congestion.    Eyes: Negative for visual disturbance.   Respiratory: Negative for chest tightness and shortness of breath.    Cardiovascular: Negative for chest pain.   Gastrointestinal: Negative for nausea and vomiting.   Genitourinary: Negative for dysuria.   Musculoskeletal: Negative for arthralgias.   Skin: Negative for rash.   Neurological: Negative for light-headedness.       Physical Exam   BP: (!) 160/89  Pulse: 92  Temp: 97.4  F (36.3  C)  Resp: 19  Weight: 73.5 kg (162 lb)  SpO2: 97 %      Physical Exam  Vitals signs and nursing note reviewed.   Constitutional:       Appearance: Normal appearance.   HENT:      Head: Normocephalic and atraumatic.      Nose:      Comments: Bleeding has stopped.  He does have an area grade on the anterior nasal septum on the left where it appears there is an exposed vessel.     Mouth/Throat:      Mouth: Mucous membranes are moist.   Eyes:      Conjunctiva/sclera: Conjunctivae normal.   Cardiovascular:      Rate and Rhythm: Normal rate and regular rhythm.   Pulmonary:      Breath sounds: Normal breath sounds.   Abdominal:      General: Abdomen is flat.      Palpations: Abdomen is soft.   Skin:     General: Skin is warm and dry.   Neurological:      Mental Status: He is alert and oriented to person, place, and time.   Psychiatric:         Mood and Affect: Mood normal.         Behavior: Behavior normal.         ED Course        Procedures               No results found for this or any previous visit (from  the past 24 hour(s)).    Medications   silver nitrate (ARZOL) Misc (has no administration in time range)       Assessments & Plan (with Medical Decision Making)     I have reviewed the nursing notes.    I have reviewed the findings, diagnosis, plan and need for follow up with the patient.  The area on the septum on the left side that was bleeding was cauterized using 4 sticks of silver nitrate.  Initially this did seem to cause it to bleed, however it stopped quite quickly and appears to be controlled at this time.  We will discharge him to home.  Told he could try a little bit of Vaseline on this area.  If it starts again he could try a couple sprays of Douglas-Synephrine, however if this continues he should follow-up with his primary and consider ENT referral.  We will have him continue his Plavix for now.    New Prescriptions    No medications on file       Final diagnoses:   Epistaxis       11/3/2020   [facility] AND \[facility] [place] [facility]\""     Mendez José MD  11/03/20 [id number]

## 2020-12-10 ENCOUNTER — OFFICE VISIT (OUTPATIENT)
Dept: FAMILY MEDICINE | Facility: OTHER | Age: 75
End: 2020-12-10
Attending: FAMILY MEDICINE
Payer: COMMERCIAL

## 2020-12-10 VITALS
BODY MASS INDEX: 25.58 KG/M2 | HEART RATE: 60 BPM | DIASTOLIC BLOOD PRESSURE: 70 MMHG | OXYGEN SATURATION: 98 % | TEMPERATURE: 97.8 F | RESPIRATION RATE: 20 BRPM | SYSTOLIC BLOOD PRESSURE: 110 MMHG | WEIGHT: 165.8 LBS

## 2020-12-10 DIAGNOSIS — S09.91XA TRAUMA OF EAR CANAL, INITIAL ENCOUNTER: Primary | ICD-10-CM

## 2020-12-10 PROCEDURE — 99213 OFFICE O/P EST LOW 20 MIN: CPT | Performed by: FAMILY MEDICINE

## 2020-12-10 PROCEDURE — G0463 HOSPITAL OUTPT CLINIC VISIT: HCPCS

## 2020-12-10 ASSESSMENT — PAIN SCALES - GENERAL: PAINLEVEL: NO PAIN (0)

## 2020-12-10 NOTE — NURSING NOTE
Patient here for right ear problem a stick poked him in the ear while out in the woods on 12/09/2020. There is a small amount of bleeding and it is seeping. Medication Reconciliation: complete.    Ayleen Naranjo LPN  12/10/2020 10:08 AM

## 2020-12-10 NOTE — PROGRESS NOTES
SUBJECTIVE:   Jared Parry is a 75 year old male who presents to clinic today for the following health issues: Right ear trauma    Patient arrives here for right ear trauma.  He was out chain sawing when a stick hit him into his right ear.  He had some bleeding and fairly sudden onset of pain.  This occurred yesterday.        Patient Active Problem List    Diagnosis Date Noted     JALYN (generalized anxiety disorder) 01/08/2020     Priority: Medium     Mild intermittent asthma without complication 06/26/2019     Priority: Medium     History of tobacco use disorder 06/03/2019     Priority: Medium     Atherosclerosis of native artery of extremity with intermittent claudication (H) 06/15/2018     Priority: Medium     Attention deficit disorder 01/25/2018     Priority: Medium     History of colonic polyps 01/25/2018     Priority: Medium     Esophageal reflux 01/25/2018     Priority: Medium     Hyperlipidemia, mild 01/25/2018     Priority: Medium     Hypertension 01/25/2018     Priority: Medium     Nephrolithiasis 01/25/2018     Priority: Medium     DUNIA on CPAP 01/25/2018     Priority: Medium     Popliteal artery embolism, 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     Priority: Medium     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    On plavix per vascular surgeon     Enlarged prostate without lower urinary tract symptoms (luts)     No Comments Provided     Essential (primary) hypertension     No Comments Provided     GERD (gastroesophageal reflux disease)      Hyperlipidemia     No Comments Provided     DUNIA on CPAP       Past Surgical History:   Procedure Laterality Date     APPENDECTOMY OPEN      No Comments Provided     ARTHROSCOPY KNEE Left      AS REMOVAL OF KIDNEY STONE  2016     COLONOSCOPY   "2017    Adenomatous, f/u in      CYSTOSCOPY, TRANSURETHRAL RESECTION (TUR) PROSTATE, COMBINED      No Comments Provided     EMBOLECTOMY LOWER EXTREMITY  2017    Popliteal     HERNIORRHAPHY INGUINAL BILATERAL      No Comments Provided     OPEN REDUCTION INTERNAL FIXATION FEMUR DISTAL Left      Current Outpatient Medications   Medication Sig Dispense Refill     atorvastatin (LIPITOR) 20 MG tablet Take 1 tablet (20 mg) by mouth daily 90 tablet 3     clopidogrel (PLAVIX) 75 MG tablet Take 1 tablet (75 mg) by mouth daily 90 tablet 3     cycloSPORINE (RESTASIS) 0.05 % ophthalmic emulsion Place 1 drop into both eyes 2 times daily       famotidine (PEPCID) 10 MG tablet Take 1 tablet (10 mg) by mouth 2 times daily       hydrochlorothiazide (HYDRODIURIL) 12.5 MG tablet Take 1 tablet (12.5 mg) by mouth daily 90 tablet 3     losartan (COZAAR) 50 MG tablet Take 1 tablet (50 mg) by mouth daily 90 tablet 3     montelukast (SINGULAIR) 10 MG tablet Take 1 tablet (10 mg) by mouth At Bedtime 90 tablet 3     Allergies   Allergen Reactions     Aspirin      Other reaction(s): GI Upset     Cephalexin Hives     Thiopental      Other reaction(s): *Unknown - Childhood Rxn  Patient states \"almost  from Pentothal as child\"       Review of Systems     OBJECTIVE:     /70   Pulse 60   Temp 97.8  F (36.6  C)   Resp 20   Wt 75.2 kg (165 lb 12.8 oz)   SpO2 98%   BMI 25.58 kg/m    Body mass index is 25.58 kg/m .  Physical Exam  Constitutional:       Appearance: Normal appearance.   HENT:      Ears:      Comments: The right ear canal does show some trauma with some small amount of blood.  He also appears to be some small trauma to the TM.  I do not see any perforation.  Neurological:      Mental Status: He is alert.         Diagnostic Test Results:  none     ASSESSMENT/PLAN:         1. Trauma of ear canal, initial encounter  Advised to monitor for now.  Follow-up if not improving.  He also has questions about Plavix " concerning his peripheral artery disease.  Whether he should continue.  He does have a history of arterial occlusion.  Recommended continuing it.        Robert Grayson MD  [facility] AND [facility]

## 2020-12-18 ENCOUNTER — OFFICE VISIT (OUTPATIENT)
Dept: INTERNAL MEDICINE | Facility: OTHER | Age: 75
End: 2020-12-18
Attending: INTERNAL MEDICINE
Payer: COMMERCIAL

## 2020-12-18 VITALS
BODY MASS INDEX: 25.62 KG/M2 | WEIGHT: 166 LBS | DIASTOLIC BLOOD PRESSURE: 66 MMHG | HEART RATE: 60 BPM | RESPIRATION RATE: 18 BRPM | SYSTOLIC BLOOD PRESSURE: 128 MMHG | TEMPERATURE: 97.4 F

## 2020-12-18 DIAGNOSIS — S09.91XA INJURY OF RIGHT EAR, INITIAL ENCOUNTER: Primary | ICD-10-CM

## 2020-12-18 PROCEDURE — G0463 HOSPITAL OUTPT CLINIC VISIT: HCPCS

## 2020-12-18 PROCEDURE — 99213 OFFICE O/P EST LOW 20 MIN: CPT | Performed by: INTERNAL MEDICINE

## 2020-12-18 ASSESSMENT — ENCOUNTER SYMPTOMS
EYES NEGATIVE: 1
ALLERGIC/IMMUNOLOGIC NEGATIVE: 1
CONSTITUTIONAL NEGATIVE: 1
ENDOCRINE NEGATIVE: 1

## 2020-12-18 ASSESSMENT — PAIN SCALES - GENERAL: PAINLEVEL: MILD PAIN (2)

## 2020-12-18 NOTE — PROGRESS NOTES
"Chief Complaint:  Ear injury.    HPI: He accidentally poked himself in the ear with a twig about a week ago.  He has had some yellow drainage and decreased hearing with some pain and swelling in the ear area.  No fever.  He is here to have this checked and evaluated further.    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    On plavix per vascular surgeon     Enlarged prostate without lower urinary tract symptoms (luts)     No Comments Provided     Essential (primary) hypertension     No Comments Provided     GERD (gastroesophageal reflux disease)      Hyperlipidemia     No Comments Provided     DUNIA on CPAP        Past Surgical History:   Procedure Laterality Date     APPENDECTOMY OPEN      No Comments Provided     ARTHROSCOPY KNEE Left      AS REMOVAL OF KIDNEY STONE       COLONOSCOPY  2017    Adenomatous, f/u in      CYSTOSCOPY, TRANSURETHRAL RESECTION (TUR) PROSTATE, COMBINED      No Comments Provided     EMBOLECTOMY LOWER EXTREMITY  2017    Popliteal     HERNIORRHAPHY INGUINAL BILATERAL      No Comments Provided     OPEN REDUCTION INTERNAL FIXATION FEMUR DISTAL Left        Allergies   Allergen Reactions     Aspirin      Other reaction(s): GI Upset     Cephalexin Hives     Thiopental      Other reaction(s): *Unknown - Childhood Rxn  Patient states \"almost  from Pentothal as child\"       Current Outpatient Medications   Medication Sig Dispense Refill     amoxicillin-clavulanate (AUGMENTIN) 875-125 MG tablet Take 1 tablet by mouth 2 times daily 14 tablet 0     atorvastatin (LIPITOR) 20 MG tablet Take 1 tablet (20 mg) by mouth daily 90 tablet 3     clopidogrel (PLAVIX) 75 MG tablet Take 1 tablet (75 mg) by mouth daily 90 tablet 3     cycloSPORINE (RESTASIS) 0.05 % " ophthalmic emulsion Place 1 drop into both eyes 2 times daily       famotidine (PEPCID) 10 MG tablet Take 1 tablet (10 mg) by mouth 2 times daily       hydrochlorothiazide (HYDRODIURIL) 12.5 MG tablet Take 1 tablet (12.5 mg) by mouth daily 90 tablet 3     losartan (COZAAR) 50 MG tablet Take 1 tablet (50 mg) by mouth daily 90 tablet 3     montelukast (SINGULAIR) 10 MG tablet Take 1 tablet (10 mg) by mouth At Bedtime 90 tablet 3       Review of Systems   Constitutional: Negative.    Eyes: Negative.    Endocrine: Negative.    Allergic/Immunologic: Negative.        Physical Exam  Vitals signs and nursing note reviewed.   Constitutional:       General: He is not in acute distress.     Appearance: Normal appearance. He is not ill-appearing, toxic-appearing or diaphoretic.   HENT:      Right Ear: There is no impacted cerumen.      Left Ear: Tympanic membrane and ear canal normal. There is no impacted cerumen.      Ears:      Comments: The right canal had some swelling but no current drainage.  Minimal tenderness of the external ear.  The TM was somewhat difficult to evaluate completely but I did not see any distinct pathology.  Neurological:      Mental Status: He is alert.         Assessment:        ICD-10-CM    1. Injury of right ear, initial encounter  S09.91XA amoxicillin-clavulanate (AUGMENTIN) 875-125 MG tablet       Plan: He injured his ear and now has some swelling and drainage he may have a slight infection.  I put him on Augmentin twice daily, he has an allergy to Keflex but tells me that he has taken penicillin in the past without problem.  If not improved, he will call and I will set him up with ENT.

## 2020-12-18 NOTE — NURSING NOTE
"Chief Complaint   Patient presents with     Ear Problem       Initial /66 (BP Location: Right arm, Patient Position: Sitting, Cuff Size: Adult Regular)   Pulse 60   Temp 97.4  F (36.3  C) (Tympanic)   Resp 18   Wt 75.3 kg (166 lb)   BMI 25.62 kg/m   Estimated body mass index is 25.62 kg/m  as calculated from the following:    Height as of 9/29/20: 1.715 m (5' 7.5\").    Weight as of this encounter: 75.3 kg (166 lb).  Medication Reconciliation: complete    Zayra Sevilla LPN  "

## 2021-03-08 DIAGNOSIS — M25.511 RIGHT SHOULDER PAIN, UNSPECIFIED CHRONICITY: Primary | ICD-10-CM

## 2021-03-09 ENCOUNTER — HOSPITAL ENCOUNTER (OUTPATIENT)
Dept: GENERAL RADIOLOGY | Facility: OTHER | Age: 76
End: 2021-03-09
Attending: ORTHOPAEDIC SURGERY
Payer: COMMERCIAL

## 2021-03-09 ENCOUNTER — OFFICE VISIT (OUTPATIENT)
Dept: ORTHOPEDICS | Facility: OTHER | Age: 76
End: 2021-03-09
Attending: ORTHOPAEDIC SURGERY
Payer: COMMERCIAL

## 2021-03-09 DIAGNOSIS — M25.511 RIGHT SHOULDER PAIN, UNSPECIFIED CHRONICITY: Primary | ICD-10-CM

## 2021-03-09 DIAGNOSIS — M25.511 RIGHT SHOULDER PAIN, UNSPECIFIED CHRONICITY: ICD-10-CM

## 2021-03-09 PROCEDURE — G0463 HOSPITAL OUTPT CLINIC VISIT: HCPCS

## 2021-03-09 PROCEDURE — 20610 DRAIN/INJ JOINT/BURSA W/O US: CPT

## 2021-03-09 PROCEDURE — 250N000009 HC RX 250: Performed by: ORTHOPAEDIC SURGERY

## 2021-03-09 PROCEDURE — 20610 DRAIN/INJ JOINT/BURSA W/O US: CPT | Mod: RT | Performed by: ORTHOPAEDIC SURGERY

## 2021-03-09 PROCEDURE — 73030 X-RAY EXAM OF SHOULDER: CPT | Mod: RT

## 2021-03-09 PROCEDURE — 99213 OFFICE O/P EST LOW 20 MIN: CPT | Mod: 25 | Performed by: ORTHOPAEDIC SURGERY

## 2021-03-09 PROCEDURE — 250N000011 HC RX IP 250 OP 636: Performed by: ORTHOPAEDIC SURGERY

## 2021-03-09 RX ORDER — TRIAMCINOLONE ACETONIDE 40 MG/ML
40 INJECTION, SUSPENSION INTRA-ARTICULAR; INTRAMUSCULAR ONCE
Status: COMPLETED | OUTPATIENT
Start: 2021-03-09 | End: 2021-03-09

## 2021-03-09 RX ORDER — LIDOCAINE HYDROCHLORIDE 10 MG/ML
4 INJECTION, SOLUTION EPIDURAL; INFILTRATION; INTRACAUDAL; PERINEURAL ONCE
Status: COMPLETED | OUTPATIENT
Start: 2021-03-09 | End: 2021-03-09

## 2021-03-09 RX ADMIN — LIDOCAINE HYDROCHLORIDE 4 ML: 10 INJECTION, SOLUTION EPIDURAL; INFILTRATION; INTRACAUDAL; PERINEURAL at 10:35

## 2021-03-09 RX ADMIN — TRIAMCINOLONE ACETONIDE 40 MG: 40 INJECTION, SUSPENSION INTRA-ARTICULAR; INTRAMUSCULAR at 10:35

## 2021-03-09 NOTE — PROGRESS NOTES
Patient is here for consult on his right shoulder pain.   Niki Ramos LPN .....................3/9/2021 10:04 AM

## 2021-03-09 NOTE — PROGRESS NOTES
Visit Date:   2021      REASON FOR EVALUATION:  Right shoulder pain.      HISTORY OF PRESENT ILLNESS:  Jared comes in with regards to his right shoulder.  It has been bothering him for the last several months here.  It is getting progressively worse; worse with activity, a little bit better with rest.  Has been doing stretching program, seems to be helpful.  He had prior injection done on his left side for glenohumeral joint arthrosis.  Had a great response in regards to that, so he is certainly thinking of going this route at this point in time.  The patient denies any other major concerns to me here today.      REVIEW OF SYSTEMS:  Otherwise negative with the exception as stated above.      PHYSICAL EXAMINATION:     GENERAL:  The patient is alert and oriented x3, cooperative with exam, in no acute distress.  Does ambulate with satisfactory gait.  Affect appropriate here as well.     MUSCULOSKELETAL:  Examination of the shoulder and the right side shows forward elevation about 160, abduction about 160, internal rotation to about hip level, external rotation about 15 degrees.  Overall strength is 5/5.  Neurovascular examination is otherwise intact here as well.        X-rays reviewed, do show moderate glenohumeral joint arthrosis.      DESCRIPTION OF PROCEDURE:  Following informed consent and sterile preparation, the patient's right shoulder glenohumeral joint was injected with 4 mL of 1% lidocaine and 40 mg of Kenalog under sterile conditions.      IMPRESSION:  Right shoulder glenohumeral joint arthrosis.      PLAN:  Injection as stated above.  Repeat in the future as appropriate and necessary.  Long-term management will consist of joint reconstruction when he is ready for that.         BRANDON RICH MD             D: 2021   T: 2021   MT: JANE      Name:     JARED STEIN   MRN:      [id number]        Account:      [id number]   :      1945           Visit Date:   2021       Document: [id number]

## 2021-05-10 ENCOUNTER — OFFICE VISIT (OUTPATIENT)
Dept: INTERNAL MEDICINE | Facility: OTHER | Age: 76
End: 2021-05-10
Attending: INTERNAL MEDICINE
Payer: COMMERCIAL

## 2021-05-10 VITALS
DIASTOLIC BLOOD PRESSURE: 72 MMHG | HEART RATE: 59 BPM | WEIGHT: 166.4 LBS | SYSTOLIC BLOOD PRESSURE: 128 MMHG | OXYGEN SATURATION: 99 % | BODY MASS INDEX: 25.68 KG/M2 | TEMPERATURE: 96.8 F | RESPIRATION RATE: 18 BRPM

## 2021-05-10 DIAGNOSIS — I10 ESSENTIAL HYPERTENSION: ICD-10-CM

## 2021-05-10 DIAGNOSIS — K21.9 GASTROESOPHAGEAL REFLUX DISEASE WITHOUT ESOPHAGITIS: ICD-10-CM

## 2021-05-10 DIAGNOSIS — I70.212 ATHEROSCLEROSIS OF NATIVE ARTERY OF LEFT LOWER EXTREMITY WITH INTERMITTENT CLAUDICATION (H): Primary | ICD-10-CM

## 2021-05-10 DIAGNOSIS — H25.9 AGE-RELATED CATARACT OF BOTH EYES, UNSPECIFIED AGE-RELATED CATARACT TYPE: ICD-10-CM

## 2021-05-10 DIAGNOSIS — G47.33 OSA ON CPAP: ICD-10-CM

## 2021-05-10 DIAGNOSIS — J45.20 MILD INTERMITTENT ASTHMA WITHOUT COMPLICATION: ICD-10-CM

## 2021-05-10 DIAGNOSIS — Z01.818 PREOPERATIVE EXAMINATION: ICD-10-CM

## 2021-05-10 PROCEDURE — G0463 HOSPITAL OUTPT CLINIC VISIT: HCPCS

## 2021-05-10 PROCEDURE — 99214 OFFICE O/P EST MOD 30 MIN: CPT | Performed by: INTERNAL MEDICINE

## 2021-05-10 RX ORDER — TRIAMCINOLONE ACETONIDE 55 UG/1
2 SPRAY, METERED NASAL DAILY
COMMUNITY
Start: 2021-05-10

## 2021-05-10 ASSESSMENT — ENCOUNTER SYMPTOMS
ARTHRALGIAS: 1
VOMITING: 0
RHINORRHEA: 0
WEAKNESS: 0
TREMORS: 0
FLANK PAIN: 0
PALPITATIONS: 0
APPETITE CHANGE: 0
DIFFICULTY URINATING: 0
NECK STIFFNESS: 0
EYE REDNESS: 0
JOINT SWELLING: 0
CHILLS: 0
ABDOMINAL PAIN: 0
EYE PAIN: 0
CONFUSION: 0
DYSURIA: 0
SINUS PAIN: 0
TROUBLE SWALLOWING: 0
DIAPHORESIS: 0
NERVOUS/ANXIOUS: 0
NAUSEA: 0
FATIGUE: 0
DIARRHEA: 0
SPEECH DIFFICULTY: 0
NUMBNESS: 0
HEMATURIA: 0
CHEST TIGHTNESS: 0
HALLUCINATIONS: 0
FREQUENCY: 0
NECK PAIN: 0
SEIZURES: 0
SORE THROAT: 0
BRUISES/BLEEDS EASILY: 0
BACK PAIN: 0
HEADACHES: 0
CONSTIPATION: 0
VOICE CHANGE: 0
LIGHT-HEADEDNESS: 0
SINUS PRESSURE: 0
BLOOD IN STOOL: 0
DIZZINESS: 0
ADENOPATHY: 0
ABDOMINAL DISTENTION: 0
FEVER: 0
WHEEZING: 0
COUGH: 0
AGITATION: 0
WOUND: 0
SHORTNESS OF BREATH: 0
SLEEP DISTURBANCE: 0
UNEXPECTED WEIGHT CHANGE: 0

## 2021-05-10 ASSESSMENT — PAIN SCALES - GENERAL: PAINLEVEL: MILD PAIN (3)

## 2021-05-10 NOTE — PROGRESS NOTES
Chief Complaint:  This patient is here for a preop consultation and clearance.    HPI: Preoperative consultation and clearance is requested by Dr. Noe.  This patient is scheduled for surgery.  He will have his right eye done on the 12th, the left eye will be done on June 2.    The patient is generally quite healthy.  He does have a previous history of embolism of his lower extremity and has been on Plavix chronically per the direction of his vascular surgeon.  The patient also has a history of hypertension and is on 2 drug therapy for control of his blood pressure.  No endorgan disease.  He has a lot of troubles with allergies and is on Singulair as well as Flonase on a regular basis.    He currently denies any acute illness including fevers or chills.  He denies shortness of breath or cough.  He denies chest pain, pressure or palpitations.  He denies any GI or  symptoms.  He is having some more arthritis troubles lately although today he is feeling better.    Medications are reconciled.  Past medical history, past surgical history, family history and social histories are reviewed and updated.  He has never had a bleeding diathesis.  He has never had a blood transfusion.  He has never had an anesthesia complication other than back in the 1950s when he had sodium pentathol.    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    On plavix per vascular surgeon     Enlarged prostate without lower urinary tract symptoms (luts)     No Comments Provided     Essential (primary) hypertension     No Comments Provided     GERD (gastroesophageal reflux disease)      Hyperlipidemia     No Comments Provided     DUNIA on CPAP        Past Surgical History:   Procedure Laterality Date      "APPENDECTOMY OPEN      No Comments Provided     ARTHROSCOPY KNEE Left      AS REMOVAL OF KIDNEY STONE  2016     COLONOSCOPY  2017    Adenomatous, f/u in      CYSTOSCOPY, TRANSURETHRAL RESECTION (TUR) PROSTATE, COMBINED      No Comments Provided     EMBOLECTOMY LOWER EXTREMITY  2017    Popliteal     HERNIORRHAPHY INGUINAL BILATERAL      No Comments Provided     OPEN REDUCTION INTERNAL FIXATION FEMUR DISTAL Left        Current Outpatient Medications   Medication Sig Dispense Refill     atorvastatin (LIPITOR) 20 MG tablet Take 1 tablet (20 mg) by mouth daily 90 tablet 3     clopidogrel (PLAVIX) 75 MG tablet Take 1 tablet (75 mg) by mouth daily 90 tablet 3     cycloSPORINE (RESTASIS) 0.05 % ophthalmic emulsion Place 1 drop into both eyes 2 times daily       famotidine (PEPCID) 10 MG tablet Take 1 tablet (10 mg) by mouth 2 times daily       hydrochlorothiazide (HYDRODIURIL) 12.5 MG tablet Take 1 tablet (12.5 mg) by mouth daily 90 tablet 3     losartan (COZAAR) 50 MG tablet Take 1 tablet (50 mg) by mouth daily 90 tablet 3     montelukast (SINGULAIR) 10 MG tablet Take 1 tablet (10 mg) by mouth At Bedtime 90 tablet 3     triamcinolone (NASACORT) 55 MCG/ACT nasal aerosol Spray 2 sprays into both nostrils daily         Allergies   Allergen Reactions     Aspirin      Other reaction(s): GI Upset     Cephalexin Hives     Thiopental      Other reaction(s): *Unknown - Childhood Rxn  Patient states \"almost  from Pentothal as child\"       Family History   Problem Relation Age of Onset     Heart Disease Father          at age 56 of an MI     Hypertension Father      Hyperlipidemia Father      Substance Abuse Father         alcohol use     Diabetes Mother      Heart Disease Mother      Hypertension Brother      Heart Disease Brother         Heart Disease,CAD starting in his late 50s     Hyperlipidemia Brother      Other - See Comments Brother         obesity     Diabetes Brother      Hypertension Brother      " Heart Disease Brother      Hyperlipidemia Brother      Other - See Comments Brother         obesity     Diabetes Brother      Alzheimer Disease Brother      Hypertension Brother      Heart Disease Brother      Prostate Cancer Brother      Other - See Comments Sister          in her mid 60's.  Viral cardiomyopathy,     Diabetes Sister      Arthritis Sister      Hypertension Sister      Hyperlipidemia Sister        Social History     Socioeconomic History     Marital status:      Spouse name: Not on file     Number of children: Not on file     Years of education: Not on file     Highest education level: Not on file   Occupational History     Not on file   Social Needs     Financial resource strain: Not on file     Food insecurity     Worry: Not on file     Inability: Not on file     Transportation needs     Medical: Not on file     Non-medical: Not on file   Tobacco Use     Smoking status: Former Smoker     Packs/day: 1.00     Years: 30.00     Pack years: 30.00     Types: Cigarettes     Quit date: 1983     Years since quittin.6     Smokeless tobacco: Former User     Quit date: 1986   Substance and Sexual Activity     Alcohol use: No     Alcohol/week: 0.0 standard drinks     Drug use: No     Comment: Drug use: No     Sexual activity: Yes   Lifestyle     Physical activity     Days per week: Not on file     Minutes per session: Not on file     Stress: Not on file   Relationships     Social connections     Talks on phone: Not on file     Gets together: Not on file     Attends Sikhism service: Not on file     Active member of club or organization: Not on file     Attends meetings of clubs or organizations: Not on file     Relationship status: Not on file     Intimate partner violence     Fear of current or ex partner: Not on file     Emotionally abused: Not on file     Physically abused: Not on file     Forced sexual activity: Not on file   Other Topics Concern     Not on file   Social  History Narrative     with two daughters from a previous marriage and five grandchildren.  Retired as a .  Lives with his wife on [address] with wife Samantha in [place].       Review of Systems   Constitutional: Negative for appetite change, chills, diaphoresis, fatigue, fever and unexpected weight change.   HENT: Negative for ear pain, rhinorrhea, sinus pressure, sinus pain, sore throat, trouble swallowing and voice change.    Eyes: Negative for pain, redness and visual disturbance.   Respiratory: Negative for cough, chest tightness, shortness of breath and wheezing.    Cardiovascular: Negative for chest pain, palpitations and leg swelling.   Gastrointestinal: Negative for abdominal distention, abdominal pain, blood in stool, constipation, diarrhea, nausea and vomiting.   Endocrine: Negative for cold intolerance and heat intolerance.   Genitourinary: Negative for difficulty urinating, dysuria, flank pain, frequency and hematuria.   Musculoskeletal: Positive for arthralgias. Negative for back pain, joint swelling, neck pain and neck stiffness.   Skin: Negative for rash and wound.   Allergic/Immunologic: Negative for immunocompromised state.   Neurological: Negative for dizziness, tremors, seizures, syncope, speech difficulty, weakness, light-headedness, numbness and headaches.   Hematological: Negative for adenopathy. Does not bruise/bleed easily.   Psychiatric/Behavioral: Negative for agitation, behavioral problems, confusion, hallucinations and sleep disturbance. The patient is not nervous/anxious.        Physical Exam  Vitals signs and nursing note reviewed.   Constitutional:       General: He is not in acute distress.     Appearance: He is well-developed. He is not diaphoretic.   HENT:      Head: Normocephalic.      Right Ear: Tympanic membrane, ear canal and external ear normal.      Left Ear: Tympanic membrane, ear canal and external ear normal.      Nose: Nose normal.       Mouth/Throat:      Pharynx: No oropharyngeal exudate.   Eyes:      Conjunctiva/sclera: Conjunctivae normal.      Pupils: Pupils are equal, round, and reactive to light.   Neck:      Musculoskeletal: Normal range of motion and neck supple.      Thyroid: No thyroid mass or thyromegaly.      Vascular: Normal carotid pulses. No carotid bruit or JVD.      Trachea: No tracheal deviation.   Cardiovascular:      Rate and Rhythm: Normal rate and regular rhythm.      Heart sounds: Normal heart sounds. No murmur. No friction rub. No gallop.    Pulmonary:      Effort: Pulmonary effort is normal. No respiratory distress.      Breath sounds: Normal breath sounds. No stridor. No decreased breath sounds, wheezing, rhonchi or rales.   Abdominal:      General: Bowel sounds are normal. There is no distension.      Palpations: Abdomen is soft. There is no mass.      Tenderness: There is no abdominal tenderness. There is no guarding or rebound.      Hernia: No hernia is present.   Musculoskeletal: Normal range of motion.      Right lower leg: No edema.      Left lower leg: No edema.   Lymphadenopathy:      Cervical: No cervical adenopathy.   Skin:     General: Skin is warm and dry.      Findings: No rash.   Neurological:      Mental Status: He is alert and oriented to person, place, and time.      Cranial Nerves: No cranial nerve deficit.      Sensory: No sensory deficit.      Motor: No abnormal muscle tone.      Coordination: Coordination normal.      Deep Tendon Reflexes: Reflexes normal.         Assessment:      ICD-10-CM    1. 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 DUNIA on CPAP  G47.33     Z99.89    3. Mild intermittent asthma without complication  J45.20    4. Gastroesophageal reflux disease without esophagitis  K21.9    5. Essential hypertension  I10    6. Age-related cataract of both eyes, unspecified age-related cataract type  H25.9    7. Preoperative examination   Z01.818         Plan: This patient is seen for preoperative consultation prior to planned cataract surgery.  He is healthy and his chronic medical problems are stable.  He will stop his Plavix 5 days prior to each procedure.  The morning of each procedure he will just take his losartan and hydrochlorothiazide, usual medications can be reinstituted postoperatively.  No contraindication to proceeding.  No further testing indicated today.

## 2021-05-10 NOTE — NURSING NOTE
Patient presents to the clinic today for a pre-op exam.    Jonah Seth LPN on 5/10/2021 at 12:41 PM

## 2021-06-07 ENCOUNTER — TELEPHONE (OUTPATIENT)
Dept: INTERNAL MEDICINE | Facility: OTHER | Age: 76
End: 2021-06-07

## 2021-06-07 NOTE — TELEPHONE ENCOUNTER
Spoke with patient and informed patient to call where he is having procedure to ask. Missy Dennis LPN .............6/7/2021  9:30 AM

## 2021-06-07 NOTE — TELEPHONE ENCOUNTER
MAF-patient has questions about taking Advil before an up coming eye procedure  Please call and advise    Thank You      Lety Ch on 6/7/2021 at 7:44 AM

## 2021-06-21 DIAGNOSIS — M25.562 LEFT KNEE PAIN, UNSPECIFIED CHRONICITY: Primary | ICD-10-CM

## 2021-06-22 ENCOUNTER — OFFICE VISIT (OUTPATIENT)
Dept: ORTHOPEDICS | Facility: OTHER | Age: 76
End: 2021-06-22
Attending: ORTHOPAEDIC SURGERY
Payer: COMMERCIAL

## 2021-06-22 ENCOUNTER — HOSPITAL ENCOUNTER (OUTPATIENT)
Dept: GENERAL RADIOLOGY | Facility: OTHER | Age: 76
End: 2021-06-22
Attending: ORTHOPAEDIC SURGERY
Payer: COMMERCIAL

## 2021-06-22 VITALS
BODY MASS INDEX: 24.25 KG/M2 | WEIGHT: 160 LBS | SYSTOLIC BLOOD PRESSURE: 126 MMHG | HEIGHT: 68 IN | DIASTOLIC BLOOD PRESSURE: 72 MMHG | OXYGEN SATURATION: 99 % | HEART RATE: 50 BPM

## 2021-06-22 DIAGNOSIS — G89.29 CHRONIC PAIN OF LEFT KNEE: Primary | ICD-10-CM

## 2021-06-22 DIAGNOSIS — M25.562 LEFT KNEE PAIN, UNSPECIFIED CHRONICITY: ICD-10-CM

## 2021-06-22 DIAGNOSIS — M25.562 CHRONIC PAIN OF LEFT KNEE: Primary | ICD-10-CM

## 2021-06-22 PROCEDURE — 250N000009 HC RX 250: Performed by: ORTHOPAEDIC SURGERY

## 2021-06-22 PROCEDURE — G0463 HOSPITAL OUTPT CLINIC VISIT: HCPCS | Mod: 25 | Performed by: ORTHOPAEDIC SURGERY

## 2021-06-22 PROCEDURE — 99213 OFFICE O/P EST LOW 20 MIN: CPT | Mod: 25 | Performed by: ORTHOPAEDIC SURGERY

## 2021-06-22 PROCEDURE — 20610 DRAIN/INJ JOINT/BURSA W/O US: CPT | Mod: LT | Performed by: ORTHOPAEDIC SURGERY

## 2021-06-22 PROCEDURE — 96372 THER/PROPH/DIAG INJ SC/IM: CPT | Performed by: ORTHOPAEDIC SURGERY

## 2021-06-22 PROCEDURE — 73560 X-RAY EXAM OF KNEE 1 OR 2: CPT | Mod: RT

## 2021-06-22 PROCEDURE — 250N000011 HC RX IP 250 OP 636: Performed by: ORTHOPAEDIC SURGERY

## 2021-06-22 RX ORDER — LIDOCAINE HYDROCHLORIDE 10 MG/ML
5 INJECTION, SOLUTION EPIDURAL; INFILTRATION; INTRACAUDAL; PERINEURAL ONCE
Status: COMPLETED | OUTPATIENT
Start: 2021-06-22 | End: 2021-06-22

## 2021-06-22 RX ORDER — TRIAMCINOLONE ACETONIDE 40 MG/ML
40 INJECTION, SUSPENSION INTRA-ARTICULAR; INTRAMUSCULAR ONCE
Status: COMPLETED | OUTPATIENT
Start: 2021-06-22 | End: 2021-06-22

## 2021-06-22 RX ADMIN — TRIAMCINOLONE ACETONIDE 40 MG: 40 INJECTION, SUSPENSION INTRA-ARTICULAR; INTRAMUSCULAR at 08:45

## 2021-06-22 RX ADMIN — LIDOCAINE HYDROCHLORIDE 5 ML: 10 INJECTION, SOLUTION EPIDURAL; INFILTRATION; INTRACAUDAL; PERINEURAL at 08:45

## 2021-06-22 ASSESSMENT — MIFFLIN-ST. JEOR: SCORE: 1432.32

## 2021-06-22 NOTE — PROGRESS NOTES
Patient presents to the clinic today for a consult on left knee pain.  Isabel Hyde LPN 6/22/2021   8:31 AM

## 2021-07-02 ENCOUNTER — APPOINTMENT (OUTPATIENT)
Dept: GENERAL RADIOLOGY | Facility: OTHER | Age: 76
End: 2021-07-02
Attending: STUDENT IN AN ORGANIZED HEALTH CARE EDUCATION/TRAINING PROGRAM
Payer: COMMERCIAL

## 2021-07-02 ENCOUNTER — HOSPITAL ENCOUNTER (EMERGENCY)
Facility: OTHER | Age: 76
Discharge: ANOTHER HOSPITAL | End: 2021-07-02
Attending: STUDENT IN AN ORGANIZED HEALTH CARE EDUCATION/TRAINING PROGRAM | Admitting: STUDENT IN AN ORGANIZED HEALTH CARE EDUCATION/TRAINING PROGRAM
Payer: COMMERCIAL

## 2021-07-02 ENCOUNTER — OFFICE VISIT (OUTPATIENT)
Dept: FAMILY MEDICINE | Facility: OTHER | Age: 76
End: 2021-07-02
Attending: NURSE PRACTITIONER
Payer: COMMERCIAL

## 2021-07-02 ENCOUNTER — HOSPITAL ENCOUNTER (INPATIENT)
Facility: HOSPITAL | Age: 76
LOS: 3 days | Discharge: HOME | DRG: 872 | End: 2021-07-05
Attending: INTERNAL MEDICINE | Admitting: INTERNAL MEDICINE
Payer: COMMERCIAL

## 2021-07-02 VITALS
DIASTOLIC BLOOD PRESSURE: 61 MMHG | OXYGEN SATURATION: 97 % | HEART RATE: 96 BPM | TEMPERATURE: 102 F | RESPIRATION RATE: 16 BRPM | SYSTOLIC BLOOD PRESSURE: 124 MMHG

## 2021-07-02 VITALS
DIASTOLIC BLOOD PRESSURE: 64 MMHG | SYSTOLIC BLOOD PRESSURE: 112 MMHG | HEIGHT: 68 IN | BODY MASS INDEX: 24.48 KG/M2 | TEMPERATURE: 100.4 F | RESPIRATION RATE: 34 BRPM | HEART RATE: 77 BPM | OXYGEN SATURATION: 84 % | WEIGHT: 161.5 LBS

## 2021-07-02 DIAGNOSIS — A41.9 SEPSIS, DUE TO UNSPECIFIED ORGANISM, UNSPECIFIED WHETHER ACUTE ORGAN DYSFUNCTION PRESENT (H): ICD-10-CM

## 2021-07-02 DIAGNOSIS — R05.9 COUGH: ICD-10-CM

## 2021-07-02 DIAGNOSIS — R50.9 FEVER AND CHILLS: Primary | ICD-10-CM

## 2021-07-02 DIAGNOSIS — N39.0 URINARY TRACT INFECTION WITH HEMATURIA, SITE UNSPECIFIED: ICD-10-CM

## 2021-07-02 DIAGNOSIS — I10 ESSENTIAL HYPERTENSION: Primary | ICD-10-CM

## 2021-07-02 DIAGNOSIS — R31.9 URINARY TRACT INFECTION WITH HEMATURIA, SITE UNSPECIFIED: ICD-10-CM

## 2021-07-02 DIAGNOSIS — A41.9 SEPSIS WITHOUT ACUTE ORGAN DYSFUNCTION, DUE TO UNSPECIFIED ORGANISM (H): ICD-10-CM

## 2021-07-02 PROBLEM — N30.00 ACUTE CYSTITIS WITHOUT HEMATURIA: Status: ACTIVE | Noted: 2021-07-02

## 2021-07-02 LAB
ALBUMIN UR-MCNC: 10 MG/DL
ANION GAP SERPL CALCULATED.3IONS-SCNC: 8 MMOL/L (ref 3–14)
APPEARANCE UR: CLEAR
BACTERIA #/AREA URNS HPF: ABNORMAL /HPF
BASOPHILS # BLD AUTO: 0.1 10E9/L (ref 0–0.2)
BASOPHILS NFR BLD AUTO: 0.3 %
BILIRUB UR QL STRIP: NEGATIVE
BUN SERPL-MCNC: 21 MG/DL (ref 7–25)
CALCIUM SERPL-MCNC: 8.9 MG/DL (ref 8.6–10.3)
CHLORIDE SERPL-SCNC: 96 MMOL/L (ref 98–107)
CO2 SERPL-SCNC: 27 MMOL/L (ref 21–31)
COLOR UR AUTO: YELLOW
CREAT SERPL-MCNC: 1.04 MG/DL (ref 0.7–1.3)
DIFFERENTIAL METHOD BLD: ABNORMAL
EOSINOPHIL # BLD AUTO: 0 10E9/L (ref 0–0.7)
EOSINOPHIL NFR BLD AUTO: 0.2 %
ERYTHROCYTE [DISTWIDTH] IN BLOOD BY AUTOMATED COUNT: 12.7 % (ref 10–15)
GFR SERPL CREATININE-BSD FRML MDRD: 70 ML/MIN/{1.73_M2}
GLUCOSE SERPL-MCNC: 114 MG/DL (ref 70–105)
GLUCOSE UR STRIP-MCNC: NEGATIVE MG/DL
HCT VFR BLD AUTO: 40.5 % (ref 40–53)
HGB BLD-MCNC: 14.2 G/DL (ref 13.3–17.7)
HGB UR QL STRIP: ABNORMAL
HYALINE CASTS #/AREA URNS LPF: 1 /LPF (ref 0–2)
IMM GRANULOCYTES # BLD: 0.1 10E9/L (ref 0–0.4)
IMM GRANULOCYTES NFR BLD: 0.4 %
KETONES UR STRIP-MCNC: NEGATIVE MG/DL
LABORATORY COMMENT REPORT: NORMAL
LACTATE BLD-SCNC: 1.9 MMOL/L (ref 0.7–2)
LEUKOCYTE ESTERASE UR QL STRIP: ABNORMAL
LYMPHOCYTES # BLD AUTO: 1 10E9/L (ref 0.8–5.3)
LYMPHOCYTES NFR BLD AUTO: 5.8 %
MCH RBC QN AUTO: 30.7 PG (ref 26.5–33)
MCHC RBC AUTO-ENTMCNC: 35.1 G/DL (ref 31.5–36.5)
MCV RBC AUTO: 88 FL (ref 78–100)
MONOCYTES # BLD AUTO: 1.1 10E9/L (ref 0–1.3)
MONOCYTES NFR BLD AUTO: 6.4 %
MUCOUS THREADS #/AREA URNS LPF: PRESENT /LPF
NEUTROPHILS # BLD AUTO: 14.3 10E9/L (ref 1.6–8.3)
NEUTROPHILS NFR BLD AUTO: 86.9 %
NITRATE UR QL: NEGATIVE
PH UR STRIP: 6 PH (ref 5–7)
PLATELET # BLD AUTO: 191 10E9/L (ref 150–450)
POTASSIUM SERPL-SCNC: 3.8 MMOL/L (ref 3.5–5.1)
RBC # BLD AUTO: 4.62 10E12/L (ref 4.4–5.9)
RBC #/AREA URNS AUTO: 9 /HPF (ref 0–2)
SARS-COV-2 RNA RESP QL NAA+PROBE: NEGATIVE
SODIUM SERPL-SCNC: 131 MMOL/L (ref 134–144)
SOURCE: ABNORMAL
SP GR UR STRIP: 1.03 (ref 1–1.03)
SPECIMEN SOURCE: NORMAL
UROBILINOGEN UR STRIP-MCNC: 2 MG/DL (ref 0–2)
WBC # BLD AUTO: 16.5 10E9/L (ref 4–11)
WBC #/AREA URNS AUTO: 153 /HPF (ref 0–5)

## 2021-07-02 PROCEDURE — U0005 INFEC AGEN DETEC AMPLI PROBE: HCPCS | Mod: ZL | Performed by: NURSE PRACTITIONER

## 2021-07-02 PROCEDURE — 87077 CULTURE AEROBIC IDENTIFY: CPT | Performed by: STUDENT IN AN ORGANIZED HEALTH CARE EDUCATION/TRAINING PROGRAM

## 2021-07-02 PROCEDURE — 120N000001 HC R&B MED SURG/OB

## 2021-07-02 PROCEDURE — 93010 ELECTROCARDIOGRAM REPORT: CPT | Performed by: INTERNAL MEDICINE

## 2021-07-02 PROCEDURE — 258N000003 HC RX IP 258 OP 636: Performed by: INTERNAL MEDICINE

## 2021-07-02 PROCEDURE — 71045 X-RAY EXAM CHEST 1 VIEW: CPT

## 2021-07-02 PROCEDURE — 85025 COMPLETE CBC W/AUTO DIFF WBC: CPT | Mod: ZL | Performed by: NURSE PRACTITIONER

## 2021-07-02 PROCEDURE — 250N000011 HC RX IP 250 OP 636: Performed by: STUDENT IN AN ORGANIZED HEALTH CARE EDUCATION/TRAINING PROGRAM

## 2021-07-02 PROCEDURE — 99207 PR NO CHARGE LOS: CPT | Performed by: NURSE PRACTITIONER

## 2021-07-02 PROCEDURE — 96365 THER/PROPH/DIAG IV INF INIT: CPT | Performed by: STUDENT IN AN ORGANIZED HEALTH CARE EDUCATION/TRAINING PROGRAM

## 2021-07-02 PROCEDURE — 99285 EMERGENCY DEPT VISIT HI MDM: CPT | Mod: 25 | Performed by: STUDENT IN AN ORGANIZED HEALTH CARE EDUCATION/TRAINING PROGRAM

## 2021-07-02 PROCEDURE — 250N000013 HC RX MED GY IP 250 OP 250 PS 637: Performed by: INTERNAL MEDICINE

## 2021-07-02 PROCEDURE — G0378 HOSPITAL OBSERVATION PER HR: HCPCS

## 2021-07-02 PROCEDURE — 81001 URINALYSIS AUTO W/SCOPE: CPT | Mod: ZL | Performed by: NURSE PRACTITIONER

## 2021-07-02 PROCEDURE — U0003 INFECTIOUS AGENT DETECTION BY NUCLEIC ACID (DNA OR RNA); SEVERE ACUTE RESPIRATORY SYNDROME CORONAVIRUS 2 (SARS-COV-2) (CORONAVIRUS DISEASE [COVID-19]), AMPLIFIED PROBE TECHNIQUE, MAKING USE OF HIGH THROUGHPUT TECHNOLOGIES AS DESCRIBED BY CMS-2020-01-R: HCPCS | Mod: ZL | Performed by: NURSE PRACTITIONER

## 2021-07-02 PROCEDURE — 99222 1ST HOSP IP/OBS MODERATE 55: CPT | Performed by: INTERNAL MEDICINE

## 2021-07-02 PROCEDURE — 87088 URINE BACTERIA CULTURE: CPT | Mod: ZL | Performed by: NURSE PRACTITIONER

## 2021-07-02 PROCEDURE — G0463 HOSPITAL OUTPT CLINIC VISIT: HCPCS

## 2021-07-02 PROCEDURE — 36415 COLL VENOUS BLD VENIPUNCTURE: CPT | Performed by: STUDENT IN AN ORGANIZED HEALTH CARE EDUCATION/TRAINING PROGRAM

## 2021-07-02 PROCEDURE — 93005 ELECTROCARDIOGRAM TRACING: CPT | Performed by: STUDENT IN AN ORGANIZED HEALTH CARE EDUCATION/TRAINING PROGRAM

## 2021-07-02 PROCEDURE — 250N000013 HC RX MED GY IP 250 OP 250 PS 637: Mod: GY | Performed by: STUDENT IN AN ORGANIZED HEALTH CARE EDUCATION/TRAINING PROGRAM

## 2021-07-02 PROCEDURE — 36415 COLL VENOUS BLD VENIPUNCTURE: CPT | Mod: ZL | Performed by: NURSE PRACTITIONER

## 2021-07-02 PROCEDURE — 87040 BLOOD CULTURE FOR BACTERIA: CPT | Performed by: STUDENT IN AN ORGANIZED HEALTH CARE EDUCATION/TRAINING PROGRAM

## 2021-07-02 PROCEDURE — 258N000003 HC RX IP 258 OP 636: Performed by: STUDENT IN AN ORGANIZED HEALTH CARE EDUCATION/TRAINING PROGRAM

## 2021-07-02 PROCEDURE — 87086 URINE CULTURE/COLONY COUNT: CPT | Mod: ZL | Performed by: NURSE PRACTITIONER

## 2021-07-02 PROCEDURE — 250N000011 HC RX IP 250 OP 636: Performed by: INTERNAL MEDICINE

## 2021-07-02 PROCEDURE — 80048 BASIC METABOLIC PNL TOTAL CA: CPT | Mod: ZL | Performed by: NURSE PRACTITIONER

## 2021-07-02 PROCEDURE — 83605 ASSAY OF LACTIC ACID: CPT | Performed by: STUDENT IN AN ORGANIZED HEALTH CARE EDUCATION/TRAINING PROGRAM

## 2021-07-02 PROCEDURE — 99285 EMERGENCY DEPT VISIT HI MDM: CPT | Performed by: STUDENT IN AN ORGANIZED HEALTH CARE EDUCATION/TRAINING PROGRAM

## 2021-07-02 PROCEDURE — 96366 THER/PROPH/DIAG IV INF ADDON: CPT | Performed by: STUDENT IN AN ORGANIZED HEALTH CARE EDUCATION/TRAINING PROGRAM

## 2021-07-02 PROCEDURE — 96375 TX/PRO/DX INJ NEW DRUG ADDON: CPT | Performed by: STUDENT IN AN ORGANIZED HEALTH CARE EDUCATION/TRAINING PROGRAM

## 2021-07-02 PROCEDURE — 999N000157 HC STATISTIC RCP TIME EA 10 MIN

## 2021-07-02 RX ORDER — MONTELUKAST SODIUM 10 MG/1
10 TABLET ORAL AT BEDTIME
Status: DISCONTINUED | OUTPATIENT
Start: 2021-07-02 | End: 2021-07-05 | Stop reason: HOSPADM

## 2021-07-02 RX ORDER — SODIUM CHLORIDE, SODIUM LACTATE, POTASSIUM CHLORIDE, CALCIUM CHLORIDE 600; 310; 30; 20 MG/100ML; MG/100ML; MG/100ML; MG/100ML
INJECTION, SOLUTION INTRAVENOUS CONTINUOUS
Status: DISCONTINUED | OUTPATIENT
Start: 2021-07-02 | End: 2021-07-03

## 2021-07-02 RX ORDER — CARBOXYMETHYLCELLULOSE SODIUM 5 MG/ML
1 SOLUTION/ DROPS OPHTHALMIC 2 TIMES DAILY
Status: DISCONTINUED | OUTPATIENT
Start: 2021-07-02 | End: 2021-07-05 | Stop reason: HOSPADM

## 2021-07-02 RX ORDER — ACETAMINOPHEN 325 MG/1
650 TABLET ORAL EVERY 4 HOURS PRN
Status: DISCONTINUED | OUTPATIENT
Start: 2021-07-02 | End: 2021-07-05 | Stop reason: HOSPADM

## 2021-07-02 RX ORDER — CLOPIDOGREL BISULFATE 75 MG/1
75 TABLET ORAL DAILY
Status: DISCONTINUED | OUTPATIENT
Start: 2021-07-03 | End: 2021-07-05 | Stop reason: HOSPADM

## 2021-07-02 RX ORDER — CEFTRIAXONE SODIUM 1 G/50ML
1 INJECTION, SOLUTION INTRAVENOUS ONCE
Status: COMPLETED | OUTPATIENT
Start: 2021-07-02 | End: 2021-07-02

## 2021-07-02 RX ORDER — KETOROLAC TROMETHAMINE 5 MG/ML
1 SOLUTION OPHTHALMIC 4 TIMES DAILY
COMMUNITY
Start: 2021-05-20 | End: 2021-07-13

## 2021-07-02 RX ORDER — CEFTRIAXONE SODIUM 1 G/50ML
1 INJECTION, SOLUTION INTRAVENOUS EVERY 24 HOURS
Status: DISCONTINUED | OUTPATIENT
Start: 2021-07-03 | End: 2021-07-03

## 2021-07-02 RX ORDER — FAMOTIDINE 10 MG
10 TABLET ORAL 2 TIMES DAILY
Status: DISCONTINUED | OUTPATIENT
Start: 2021-07-03 | End: 2021-07-05 | Stop reason: HOSPADM

## 2021-07-02 RX ORDER — LIDOCAINE 40 MG/G
CREAM TOPICAL
Status: DISCONTINUED | OUTPATIENT
Start: 2021-07-02 | End: 2021-07-05 | Stop reason: HOSPADM

## 2021-07-02 RX ORDER — HYDROCHLOROTHIAZIDE 12.5 MG/1
12.5 CAPSULE ORAL DAILY
Status: DISCONTINUED | OUTPATIENT
Start: 2021-07-03 | End: 2021-07-03

## 2021-07-02 RX ORDER — ONDANSETRON 4 MG/1
4 TABLET, ORALLY DISINTEGRATING ORAL EVERY 6 HOURS PRN
Status: DISCONTINUED | OUTPATIENT
Start: 2021-07-02 | End: 2021-07-05 | Stop reason: HOSPADM

## 2021-07-02 RX ORDER — ATORVASTATIN CALCIUM 20 MG/1
20 TABLET, FILM COATED ORAL DAILY
Status: DISCONTINUED | OUTPATIENT
Start: 2021-07-03 | End: 2021-07-05 | Stop reason: HOSPADM

## 2021-07-02 RX ORDER — ACETAMINOPHEN 500 MG
1000 TABLET ORAL ONCE
Status: COMPLETED | OUTPATIENT
Start: 2021-07-02 | End: 2021-07-02

## 2021-07-02 RX ORDER — DIPHENHYDRAMINE HYDROCHLORIDE 50 MG/ML
25 INJECTION INTRAMUSCULAR; INTRAVENOUS ONCE
Status: COMPLETED | OUTPATIENT
Start: 2021-07-02 | End: 2021-07-02

## 2021-07-02 RX ORDER — ONDANSETRON 2 MG/ML
4 INJECTION INTRAMUSCULAR; INTRAVENOUS EVERY 6 HOURS PRN
Status: DISCONTINUED | OUTPATIENT
Start: 2021-07-02 | End: 2021-07-05 | Stop reason: HOSPADM

## 2021-07-02 RX ORDER — LOSARTAN POTASSIUM 50 MG/1
50 TABLET ORAL DAILY
Status: DISCONTINUED | OUTPATIENT
Start: 2021-07-03 | End: 2021-07-05

## 2021-07-02 RX ORDER — PREDNISOLONE ACETATE 10 MG/ML
1 SUSPENSION/ DROPS OPHTHALMIC 4 TIMES DAILY
COMMUNITY
Start: 2021-05-20 | End: 2021-07-13

## 2021-07-02 RX ORDER — MOXIFLOXACIN 5 MG/ML
1 SOLUTION/ DROPS OPHTHALMIC 4 TIMES DAILY
Status: ON HOLD | COMMUNITY
Start: 2021-05-23 | End: 2021-07-05

## 2021-07-02 RX ORDER — SODIUM CHLORIDE 9 MG/ML
INJECTION, SOLUTION INTRAVENOUS CONTINUOUS
Status: DISCONTINUED | OUTPATIENT
Start: 2021-07-02 | End: 2021-07-02 | Stop reason: HOSPADM

## 2021-07-02 RX ADMIN — SODIUM CHLORIDE 1000 ML: 9 INJECTION, SOLUTION INTRAVENOUS at 16:46

## 2021-07-02 RX ADMIN — ENOXAPARIN SODIUM 40 MG: 40 INJECTION SUBCUTANEOUS at 22:17

## 2021-07-02 RX ADMIN — DIPHENHYDRAMINE HYDROCHLORIDE 25 MG: 50 INJECTION INTRAMUSCULAR; INTRAVENOUS at 16:52

## 2021-07-02 RX ADMIN — CARBOXYMETHYLCELLULOSE SODIUM 1 DROP: 5 SOLUTION/ DROPS OPHTHALMIC at 22:18

## 2021-07-02 RX ADMIN — MONTELUKAST 10 MG: 10 TABLET, FILM COATED ORAL at 22:17

## 2021-07-02 RX ADMIN — ACETAMINOPHEN 1000 MG: 500 TABLET, FILM COATED ORAL at 18:22

## 2021-07-02 RX ADMIN — CEFTRIAXONE SODIUM 1 G: 1 INJECTION, SOLUTION INTRAVENOUS at 16:44

## 2021-07-02 RX ADMIN — SODIUM CHLORIDE, POTASSIUM CHLORIDE, SODIUM LACTATE AND CALCIUM CHLORIDE: 600; 310; 30; 20 INJECTION, SOLUTION INTRAVENOUS at 22:17

## 2021-07-02 RX ADMIN — SODIUM CHLORIDE: 9 INJECTION, SOLUTION INTRAVENOUS at 19:27

## 2021-07-02 ASSESSMENT — PAIN SCALES - GENERAL: PAINLEVEL: NO PAIN (0)

## 2021-07-02 ASSESSMENT — MIFFLIN-ST. JEOR: SCORE: 1439.12

## 2021-07-02 NOTE — NURSING NOTE
"Chief Complaint   Patient presents with     Cough     cough, fever, covid test     Aches and pains, night sweats and chills for 3 nights now, has tried tylenol and advil with no relief.  covid swab done.    /64 (BP Location: Right arm, Patient Position: Sitting)   Pulse 77   Temp 101.1  F (38.4  C) (Tympanic)   Resp 16   Ht 1.715 m (5' 7.5\")   Wt 73.3 kg (161 lb 8 oz)   SpO2 95%   BMI 24.92 kg/m        Medication Reconciliation: complete    Gaby Dalal, VIDYA    "

## 2021-07-02 NOTE — ED TRIAGE NOTES
ED Nursing Triage Note (General)   ________________________________    Jared PEÑA Parry is a 75 year old Male that presents to triage private car  With history of  Fever and chills  reported by patient   Significant symptoms had onset 3 day(s) ago.  There were no vitals taken for this visit.t  Patient appears alert , in mild distress., and cooperative behavior.    GCS Total = 15  Airway: intact  Breathing noted as new oxygen need 2 LPM  Circulation Normal  Skin:  Normal  Action taken:  Triage to critical care immediately      PRE HOSPITAL PRIOR LIVING SITUATION Alone

## 2021-07-02 NOTE — ED PROVIDER NOTES
"  History     Chief Complaint   Patient presents with     Fever       Jared Parry is a 75 year old male who presents from rapid clinic for fever and rigors and a brief episode of shortness of breath.  Patient reports several days of fevers and chills and last night had an episode of night sweats.  He does have some cramping in his left leg which he thinks might be bursitis but otherwise denies any pain.  He does have some nausea, but denies vomiting.  Denies dysuria, diarrhea, cough, dyspnea, rashes, lesions, recent cuts to his skin.  Dyspnea he had in the rapid clinic was very short-lived and spontaneous resolved after his peripheral IV was inserted.  Covid vaccinated.    Allergies   Allergen Reactions     Aspirin      Other reaction(s): GI Upset     Cephalexin Hives     Ciprofloxacin      Thiopental      Other reaction(s): *Unknown - Childhood Rxn  Patient states \"almost  from Pentothal as child\"       Patient Active Problem List    Diagnosis Date Noted     JALYN (generalized anxiety disorder) 2020     Priority: Medium     Mild intermittent asthma without complication 2019     Priority: Medium     History of tobacco use disorder 2019     Priority: Medium     Atherosclerosis of native artery of extremity with intermittent claudication (H) 06/15/2018     Priority: Medium     Attention deficit disorder 2018     Priority: Medium     History of colonic polyps 2018     Priority: Medium     Esophageal reflux 2018     Priority: Medium     Hyperlipidemia, mild 2018     Priority: Medium     Hypertension 2018     Priority: Medium     Nephrolithiasis 2018     Priority: Medium     DUNIA on CPAP 2018     Priority: Medium     Popliteal artery embolism, left (H) 2017     Priority: Medium       Past Medical History:   Diagnosis Date     Attention-deficit hyperactivity disorder      Benign neoplasm of colon      Calculus of kidney      Embolism and thrombosis of " artery of lower extremity (H) 2017     Enlarged prostate without lower urinary tract symptoms (luts)      Essential (primary) hypertension      GERD (gastroesophageal reflux disease)      Hyperlipidemia      DUNIA on CPAP        Past Surgical History:   Procedure Laterality Date     APPENDECTOMY OPEN      No Comments Provided     ARTHROSCOPY KNEE Left      AS REMOVAL OF KIDNEY STONE  2016     COLONOSCOPY  2017    Adenomatous, f/u in      CYSTOSCOPY, TRANSURETHRAL RESECTION (TUR) PROSTATE, COMBINED      No Comments Provided     EMBOLECTOMY LOWER EXTREMITY  2017    Popliteal     HERNIORRHAPHY INGUINAL BILATERAL      No Comments Provided     OPEN REDUCTION INTERNAL FIXATION FEMUR DISTAL Left        Family History   Problem Relation Age of Onset     Heart Disease Father          at age 56 of an MI     Hypertension Father      Hyperlipidemia Father      Substance Abuse Father         alcohol use     Diabetes Mother      Heart Disease Mother      Hypertension Brother      Heart Disease Brother         Heart Disease,CAD starting in his late 50s     Hyperlipidemia Brother      Other - See Comments Brother         obesity     Diabetes Brother      Hypertension Brother      Heart Disease Brother      Hyperlipidemia Brother      Other - See Comments Brother         obesity     Diabetes Brother      Alzheimer Disease Brother      Hypertension Brother      Heart Disease Brother      Prostate Cancer Brother      Other - See Comments Sister          in her mid 60's.  Viral cardiomyopathy,     Diabetes Sister      Arthritis Sister      Hypertension Sister      Hyperlipidemia Sister        Social History     Tobacco Use     Smoking status: Former Smoker     Packs/day: 1.00     Years: 30.00     Pack years: 30.00     Types: Cigarettes     Quit date: 1983     Years since quittin.8     Smokeless tobacco: Former User     Quit date: 1986   Substance Use Topics     Alcohol use: No     Alcohol/week:  0.0 standard drinks     Drug use: No     Comment: Drug use: No       Medications:    atorvastatin (LIPITOR) 20 MG tablet  clopidogrel (PLAVIX) 75 MG tablet  cycloSPORINE (RESTASIS) 0.05 % ophthalmic emulsion  famotidine (PEPCID) 10 MG tablet  hydrochlorothiazide (HYDRODIURIL) 12.5 MG tablet  ketorolac (ACULAR) 0.5 % ophthalmic solution  Lactobacillus (PROBIOTIC ACIDOPHILUS) TABS  losartan (COZAAR) 50 MG tablet  montelukast (SINGULAIR) 10 MG tablet  moxifloxacin (VIGAMOX) 0.5 % ophthalmic solution  prednisoLONE acetate (PRED FORTE) 1 % ophthalmic suspension  triamcinolone (NASACORT) 55 MCG/ACT nasal aerosol        Review of Systems: See HPI for pertinent negatives and positives. All other systems reviewed and found to be negative.    Physical Exam   /65   Pulse 92   Temp 102.1  F (38.9  C) (Tympanic)   Resp 22   SpO2 96%      General: awake, comfortable  HEENT: atraumatic, neck supple  Respiratory: normal effort, clear to auscultation bilaterally  Cardiovascular: regular rate and rhythm, no murmurs  Abdomen: soft, nondistended, nontender  Extremities: no deformities, edema, or tenderness  Skin: warm, dry, no rashes  Neuro: alert, no focal deficits  Psych: appropriate mood and affect    ED Course           Results for orders placed or performed during the hospital encounter of 07/02/21 (from the past 24 hour(s))   Lactic acid whole blood STAT   Result Value Ref Range    Lactic Acid 1.9 0.7 - 2.0 mmol/L   XR Chest Port 1 View    Narrative    Procedure:XR CHEST PORT 1 VIEW    Clinical history:Male, 75 years, fevers, tachypnea    Technique: Single view was obtained.    Comparison: None    Findings: The cardiac silhouette is normal. The pulmonary vasculature  is normal.    The lungs are clear. Bony structures are unremarkable.      Impression    Impression:   No acute abnormality. No evidence of acute or active cardiopulmonary  disease.    CLEMENT AG MD          SYSTEM ID:  [id number]       Medications    0.9% sodium chloride BOLUS (1,000 mLs Intravenous New Bag 7/2/21 1646)     Followed by   sodium chloride 0.9% infusion (has no administration in time range)   cefTRIAXone in d5w (ROCEPHIN) intermittent infusion 1 g (1 g Intravenous New Bag 7/2/21 1644)   diphenhydrAMINE (BENADRYL) injection 25 mg (25 mg Intravenous Given 7/2/21 1652)   acetaminophen (TYLENOL) tablet 1,000 mg (1,000 mg Oral Given 7/2/21 1822)       Assessments & Plan (with Medical Decision Making)     I have reviewed the nursing notes.    75-year-old male evaluated for fever and chills and rigors in Covid vaccinated patient.  Febrile, heart rate in the 90s, tachypneic, and initially appearing chilled.  Benign exam otherwise. Rapid clinic labs prior to arrival remarkable for large leukocyte esterase and RBCs in UA, leukocytosis 16.5 with left shift, hyponatremia 131. Normal renal function and lactate.  Chest x-ray unremarkable.  Patient meets SIRS criteria for sepsis without endorgan failure.  Given IV fluids and ceftriaxone for likely UTI.  Case discussed with [place] hospitalist Dr. Garvin who accepts transfer.      One of patient's concerns is access to CPAP and eyedrops for recent cataract surgery.    I have reviewed the findings, diagnosis, and plan with the patient and wife.    New Prescriptions    No medications on file       Final diagnoses:   Sepsis without acute organ dysfunction, due to unspecified organism (H)   Urinary tract infection with hematuria, site unspecified       7/2/2021   [facility]     Trevon Vasques MD  07/02/21 5093

## 2021-07-03 PROBLEM — J45.909 ASTHMA: Status: ACTIVE | Noted: 2021-07-03

## 2021-07-03 PROCEDURE — 258N000003 HC RX IP 258 OP 636: Performed by: INTERNAL MEDICINE

## 2021-07-03 PROCEDURE — 250N000011 HC RX IP 250 OP 636: Performed by: INTERNAL MEDICINE

## 2021-07-03 PROCEDURE — 120N000001 HC R&B MED SURG/OB

## 2021-07-03 PROCEDURE — 99232 SBSQ HOSP IP/OBS MODERATE 35: CPT | Performed by: INTERNAL MEDICINE

## 2021-07-03 PROCEDURE — 999N000157 HC STATISTIC RCP TIME EA 10 MIN

## 2021-07-03 PROCEDURE — 250N000013 HC RX MED GY IP 250 OP 250 PS 637: Performed by: INTERNAL MEDICINE

## 2021-07-03 RX ORDER — PREDNISOLONE ACETATE 10 MG/ML
1 SUSPENSION/ DROPS OPHTHALMIC 3 TIMES DAILY
Status: DISCONTINUED | OUTPATIENT
Start: 2021-07-03 | End: 2021-07-05 | Stop reason: HOSPADM

## 2021-07-03 RX ORDER — ZOLPIDEM TARTRATE 5 MG/1
5 TABLET ORAL
Status: DISCONTINUED | OUTPATIENT
Start: 2021-07-03 | End: 2021-07-05 | Stop reason: HOSPADM

## 2021-07-03 RX ORDER — CEFTRIAXONE SODIUM 2 G/50ML
2 INJECTION, SOLUTION INTRAVENOUS EVERY 24 HOURS
Status: DISCONTINUED | OUTPATIENT
Start: 2021-07-03 | End: 2021-07-05

## 2021-07-03 RX ORDER — TRAZODONE HYDROCHLORIDE 50 MG/1
50 TABLET, FILM COATED ORAL AT BEDTIME
Status: DISCONTINUED | OUTPATIENT
Start: 2021-07-03 | End: 2021-07-03

## 2021-07-03 RX ORDER — HYDROCHLOROTHIAZIDE 12.5 MG/1
25 CAPSULE ORAL DAILY
Status: DISCONTINUED | OUTPATIENT
Start: 2021-07-04 | End: 2021-07-05 | Stop reason: HOSPADM

## 2021-07-03 RX ORDER — TRAZODONE HYDROCHLORIDE 50 MG/1
50 TABLET, FILM COATED ORAL
Status: DISCONTINUED | OUTPATIENT
Start: 2021-07-03 | End: 2021-07-03

## 2021-07-03 RX ORDER — AMLODIPINE BESYLATE 2.5 MG/1
2.5 TABLET ORAL DAILY
Status: DISCONTINUED | OUTPATIENT
Start: 2021-07-03 | End: 2021-07-04

## 2021-07-03 RX ORDER — LANOLIN ALCOHOL/MO/W.PET/CERES
6 CREAM (GRAM) TOPICAL AT BEDTIME
Status: DISCONTINUED | OUTPATIENT
Start: 2021-07-03 | End: 2021-07-05 | Stop reason: HOSPADM

## 2021-07-03 RX ADMIN — HYDROCHLOROTHIAZIDE 12.5 MG: 12.5 CAPSULE ORAL at 09:23

## 2021-07-03 RX ADMIN — SODIUM CHLORIDE, POTASSIUM CHLORIDE, SODIUM LACTATE AND CALCIUM CHLORIDE: 600; 310; 30; 20 INJECTION, SOLUTION INTRAVENOUS at 05:36

## 2021-07-03 RX ADMIN — SODIUM CHLORIDE, POTASSIUM CHLORIDE, SODIUM LACTATE AND CALCIUM CHLORIDE: 600; 310; 30; 20 INJECTION, SOLUTION INTRAVENOUS at 14:04

## 2021-07-03 RX ADMIN — ACETAMINOPHEN 650 MG: 325 TABLET, FILM COATED ORAL at 09:24

## 2021-07-03 RX ADMIN — ACETAMINOPHEN 650 MG: 325 TABLET, FILM COATED ORAL at 21:12

## 2021-07-03 RX ADMIN — CARBOXYMETHYLCELLULOSE SODIUM 1 DROP: 5 SOLUTION/ DROPS OPHTHALMIC at 09:24

## 2021-07-03 RX ADMIN — Medication 1 MG: at 01:04

## 2021-07-03 RX ADMIN — ACETAMINOPHEN 650 MG: 325 TABLET, FILM COATED ORAL at 01:04

## 2021-07-03 RX ADMIN — ATORVASTATIN CALCIUM 20 MG: 20 TABLET, FILM COATED ORAL at 09:24

## 2021-07-03 RX ADMIN — ZOLPIDEM TARTRATE 5 MG: 5 TABLET, FILM COATED ORAL at 21:10

## 2021-07-03 RX ADMIN — MELATONIN 6 MG: 3 TAB ORAL at 21:10

## 2021-07-03 RX ADMIN — CEFTRIAXONE SODIUM 2 G: 2 INJECTION, SOLUTION INTRAVENOUS at 16:14

## 2021-07-03 RX ADMIN — NEPAFENAC 1 DROP: 3 SUSPENSION OPHTHALMIC at 17:08

## 2021-07-03 RX ADMIN — LOSARTAN POTASSIUM 50 MG: 50 TABLET, FILM COATED ORAL at 09:23

## 2021-07-03 RX ADMIN — ENOXAPARIN SODIUM 40 MG: 40 INJECTION SUBCUTANEOUS at 21:09

## 2021-07-03 RX ADMIN — FAMOTIDINE 10 MG: 10 TABLET ORAL at 09:24

## 2021-07-03 RX ADMIN — CLOPIDOGREL BISULFATE 75 MG: 75 TABLET ORAL at 09:23

## 2021-07-03 RX ADMIN — PREDNISOLONE ACETATE 1 DROP: 10 SUSPENSION/ DROPS OPHTHALMIC at 21:09

## 2021-07-03 RX ADMIN — FAMOTIDINE 10 MG: 10 TABLET ORAL at 21:10

## 2021-07-03 RX ADMIN — CARBOXYMETHYLCELLULOSE SODIUM 1 DROP: 5 SOLUTION/ DROPS OPHTHALMIC at 21:11

## 2021-07-03 RX ADMIN — MONTELUKAST 10 MG: 10 TABLET, FILM COATED ORAL at 21:09

## 2021-07-03 ASSESSMENT — ACTIVITIES OF DAILY LIVING (ADL)
ADLS_ACUITY_SCORE: 16

## 2021-07-03 NOTE — PROGRESS NOTES
[facility]    Medicine Progress Note - Hospitalist Service       Date of Admission:  7/2/2021    Assessment & Plan              Jared Parry is a 75 year old male with h/o htn, hyperlipidemia, PVD admitted on 7/2/2021. He originally presented to [facility] with fever, chills and diagnosed UTI/sepsis. Clinically septic and found to have GNR in blood    Problem list:   1. Sepsis: GNR bacteremia due to UTI. Will treat UTI with Rocephin, IvF, f/u cultures.     2. UTI: clinically cystitis. H/o BPH. .     3. PVD with h/o arterial thrombosis. Continue Plavix, atorvastatin. No limb ischemia    4. HTN: continue losartan. Hydrochlorothiazide on hold    5. DUNIA: uses CPAP. Will call RT to evaluate.     6. HypoNatremia likely 2/2 hypovolemia       Diet: Combination Diet 2 gm NA Diet; Low Saturated Fat Na <2400mg Diet    DVT Prophylaxis: enoxaparin  Allison Catheter: Not present  Central Lines: None  Code Status: Full Code      Disposition Plan   Expected discharge: 2 - 3 days, recommended to prior living arrangement once SIRS/Sepsis treated.     The patient's care was discussed with the Patient.    Elvis oRbles MD  Hospitalist Service  [facility]  Securely message with the Vocera Web Console (learn more here)  Text page via [employer] Paging/Directory      Risk Factors Present on Admission         # Hyponatremia: Na = 131 mmol/L (Ref range: 134 - 144 mmol/L) on admission, will monitor as appropriate      # Platelet Defect: home medication list includes an antiplatelet medication      ______________________________________________________________________    Interval History   Feeling less chills, no further fevers, states poor sleep would like something for sleep  Ambulating well  Denies chest pain, dyspnea,     Data reviewed today: I reviewed all medications, new labs and imaging results over the last 24 hours. I personally reviewed   Physical Exam   Vital Signs: Temp: 98.5  F (36.9  C) Temp src:  Tympanic BP: 139/58 Pulse: 70   Resp: 16 SpO2: 97 % O2 Device: None (Room air)    Weight: 162 lbs 4.14 oz  Constitutional: awake, alert, cooperative, no apparent distress, and appears stated age  Respiratory: No increased work of breathing, good air exchange, clear to auscultation bilaterally, no crackles or wheezing  Cardiovascular: Normal apical impulse, regular rate and rhythm, normal S1 and S2, no S3 or S4, and no murmur noted  GI: No scars, normal bowel sounds, soft, non-distended, non-tender, no masses palpated, no hepatosplenomegally  Skin: no bruising or bleeding  Neurologic: Awake, alert, oriented to name, place and time.  Cranial nerves II-XII are grossly intact.  Motor is 5 out of 5 bilaterally.  Cerebellar finger to nose, heel to shin intact.  Sensory is intact.  Babinski down going, Romberg negative, and gait is normal.    Data   Recent Labs   Lab 07/02/21  1500   WBC 16.5*   HGB 14.2   MCV 88      *   POTASSIUM 3.8   CHLORIDE 96*   CO2 27   BUN 21   CR 1.04   ANIONGAP 8   SKYE 8.9   *     Recent Results (from the past 24 hour(s))   XR Chest Port 1 View    Narrative    Procedure:XR CHEST PORT 1 VIEW    Clinical history:Male, 75 years, fevers, tachypnea    Technique: Single view was obtained.    Comparison: None    Findings: The cardiac silhouette is normal. The pulmonary vasculature  is normal.    The lungs are clear. Bony structures are unremarkable.      Impression    Impression:   No acute abnormality. No evidence of acute or active cardiopulmonary  disease.    CLEMENT AG MD          SYSTEM ID:  [id number]     Medications     lactated ringers 125 mL/hr at 07/03/21 0536       atorvastatin  20 mg Oral Daily     carboxymethylcellulose PF  1 drop Both Eyes BID     cefTRIAXone  2 g Intravenous Q24H     clopidogrel  75 mg Oral Daily     enoxaparin ANTICOAGULANT  40 mg Subcutaneous Q24H     famotidine  10 mg Oral BID     hydrochlorothiazide  12.5 mg Oral Daily     losartan  50 mg  Oral Daily     montelukast  10 mg Oral At Bedtime     sodium chloride (PF)  3 mL Intracatheter Q8H

## 2021-07-03 NOTE — H&P
[facility]    History and Physical - Hospitalist Service       Date of Admission:  7/2/2021    Assessment & Plan      Jared Parry is a 75 year old male admitted on 7/2/2021. He originally presented to [facility] with fever, chills and diagnosed UTI/sepsis. Clinically septic and cannot be treated at home. [facility] is on divert. Transferred to our hospital.     Problem list:   1. Sepsis: due to UTI. Will treat UTI with Rocephin, IvF, f/u cultures.     2. UTI: clinically cystitis. H/o BPH. Will also do PRN bladder scans to r/o retention. Renal function at base.     3. PVD with h/o arterial thrombosis. Continue plavix. No limb ischemia    4. HTN: continue hydrochlorothiazide, losartan    5. DUNIA: uses CPAP. Will call RT to evaluate.     6. HypoNatremia: present on admission. Most likely due to Microzide. Will monitor. He will get LR also.         Diet: Combination Diet 2 gm NA Diet; Low Saturated Fat Na <2400mg Diet    DVT Prophylaxis: Enoxaparin (Lovenox) SQ  Allison Catheter: Not present  Central Lines: None  Code Status: Full Code      Risk Factors Present on Admission         # Hyponatremia: Na = 131 mmol/L (Ref range: 134 - 144 mmol/L) on admission, will monitor as appropriate      # Platelet Defect: home medication list includes an antiplatelet medication      Disposition Plan   Expected discharge: 2 - 3 days, recommended to prior living arrangement once SIRS/Sepsis treated.     The patient's care was discussed with the on call team.     Abdelrahman Moctezuma MD  [facility]  Securely message with the Vocera Web Console (learn more here)  Text page via [employer] Paging/Directory      ______________________________________________________________________    Chief Complaint   Chills, rigors, fatigue, body aches x 2-3 days.      History is obtained from the patient, electronic health record and emergency department physician    History of Present Illness   Jared Parry is a 75 year old  "male who has PMH of nephrolithiasis, BPH, HTN, arterial thrombosis, Dunia who presents to PCP office for evaluation of rigors, chills, myalgias and fever. Symptoms began 2-3 days prior, but worsened on the day of admission when he started experiencing more fatigue, rigors.   He presented to PCP office febrile with fever 101. He c/o nausea, some mild new cough, but no dyspnea, chest pain, or abdominal pain. No \"real\" dysuria, no hematuria.  He only admits that his nocturia mildly increased. He got XR at PCP office, lactic acid was 1.9. Blood work revealed leukocytosis and urine returned very abnormal - leukocytes 153, large leukocyte esterase.   He was sent to [facility] ED. Cultures obtained and Rocephin given. He meat SARS / Sepsis criteria and meets inpatient admission criteria. No Beds available at [facility]. Transferred to our hospital.   Seen in room 3112, stable.      Review of Systems    12 points of RoS obtained. Pertinent positives and negatives included in HPI. The rest is negative.       Past Medical History    I have reviewed this patient's medical history and updated it with pertinent information if needed.   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  Past Surgical History:   Procedure Laterality Date     APPENDECTOMY OPEN      No Comments " Provided     ARTHROSCOPY KNEE Left      AS REMOVAL OF KIDNEY STONE  2016     COLONOSCOPY  2017    Adenomatous, f/u in      CYSTOSCOPY, TRANSURETHRAL RESECTION (TUR) PROSTATE, COMBINED      No Comments Provided     EMBOLECTOMY LOWER EXTREMITY  2017    Popliteal     HERNIORRHAPHY INGUINAL BILATERAL      No Comments Provided     OPEN REDUCTION INTERNAL FIXATION FEMUR DISTAL Left        Social History   I have reviewed this patient's social history and updated it with pertinent information if needed.  Social History     Tobacco Use     Smoking status: Former Smoker     Packs/day: 1.00     Years: 30.00     Pack years: 30.00     Types: Cigarettes     Quit date: 1983     Years since quittin.8     Smokeless tobacco: Former User     Quit date: 1986   Substance Use Topics     Alcohol use: No     Alcohol/week: 0.0 standard drinks     Drug use: No     Comment: Drug use: No       Family History   I have reviewed this patient's family history and updated it with pertinent information if needed.  Family History   Problem Relation Age of Onset     Heart Disease Father          at age 56 of an MI     Hypertension Father      Hyperlipidemia Father      Substance Abuse Father         alcohol use     Diabetes Mother      Heart Disease Mother      Hypertension Brother      Heart Disease Brother         Heart Disease,CAD starting in his late 50s     Hyperlipidemia Brother      Other - See Comments Brother         obesity     Diabetes Brother      Hypertension Brother      Heart Disease Brother      Hyperlipidemia Brother      Other - See Comments Brother         obesity     Diabetes Brother      Alzheimer Disease Brother      Hypertension Brother      Heart Disease Brother      Prostate Cancer Brother      Other - See Comments Sister          in her mid 60's.  Viral cardiomyopathy,     Diabetes Sister      Arthritis Sister      Hypertension Sister      Hyperlipidemia Sister        Prior to  "Admission Medications   Prior to Admission Medications   Prescriptions Last Dose Informant Patient Reported? Taking?   Lactobacillus (PROBIOTIC ACIDOPHILUS) TABS 2021 at 0800  Yes Yes   atorvastatin (LIPITOR) 20 MG tablet 2021 at 2000  No Yes   Sig: Take 1 tablet (20 mg) by mouth daily   clopidogrel (PLAVIX) 75 MG tablet 2021 at 2000  No Yes   Sig: Take 1 tablet (75 mg) by mouth daily   cycloSPORINE (RESTASIS) 0.05 % ophthalmic emulsion 2021 at 1300  Yes Yes   Sig: Place 1 drop into both eyes 2 times daily   famotidine (PEPCID) 10 MG tablet 2021 at 1900  Yes Yes   Sig: Take 1 tablet (10 mg) by mouth 2 times daily   hydrochlorothiazide (HYDRODIURIL) 12.5 MG tablet 2021 at 0800  No Yes   Sig: Take 1 tablet (12.5 mg) by mouth daily   ketorolac (ACULAR) 0.5 % ophthalmic solution Unknown at Unknown time  Yes No   losartan (COZAAR) 50 MG tablet 2021 at 0800  No Yes   Sig: Take 1 tablet (50 mg) by mouth daily   montelukast (SINGULAIR) 10 MG tablet 2021 at 2000  No Yes   Sig: Take 1 tablet (10 mg) by mouth At Bedtime   moxifloxacin (VIGAMOX) 0.5 % ophthalmic solution More than a month at Unknown time  Yes No   Sig: INSTILL 1 DROP IN LEFT EYE FOUR TIMES DAILY AS DIRECTED. START 3 DAYS BEFORE SURGERY AND. CONTINUE FOR 1 WEEK AFTER SURGERY   prednisoLONE acetate (PRED FORTE) 1 % ophthalmic suspension 2021 at Unknown time  Yes Yes   triamcinolone (NASACORT) 55 MCG/ACT nasal aerosol 2021 at 0800  Yes Yes   Sig: Spray 2 sprays into both nostrils daily      Facility-Administered Medications: None     Allergies   Allergies   Allergen Reactions     Thiopental      Other reaction(s): *Unknown - Childhood Rxn  Patient states \"almost  from Pentothal as child\"     Cephalexin Hives     Aspirin      Other reaction(s): GI Upset     Ciprofloxacin      ED treatment:   0.9% sodium chloride BOLUS (1,000 mLs Intravenous New Bag 21 7395)     Followed by   sodium chloride 0.9% infusion (has no " administration in time range)   cefTRIAXone in d5w (ROCEPHIN) intermittent infusion 1 g (1 g Intravenous New Bag 7/2/21 1644)   diphenhydrAMINE (BENADRYL) injection 25 mg (25 mg Intravenous Given 7/2/21 1652)   acetaminophen (TYLENOL) tablet 1,000 mg (1,000 mg Oral Given 7/2/21 1822)       Physical Exam   Vital Signs: Temp: 99.6  F (37.6  C) Temp src: Tympanic BP: (!) 117/49 Pulse: 83   Resp: 18 SpO2: 94 % O2 Device: None (Room air)    Weight: 162 lbs 4.14 oz    General Appearance: not in any distress. Feeling better.   Eyes: EOMI, no icterus.   HEENT: OP clear.   Respiratory: Clear breath sounds.   Cardiovascular: Regular, no S3, good peripheral pulses, no edema  GI: soft, not tender abdomen. BS present.   Lymph/Hematologic: no LAD, no ecchymoses.   Genitourinary: normal male genitalia, bladder not palpable.   Skin: warm, well perfused.   Musculoskeletal: normal muscle bulk, no joint effusion.   Neurologic: non-focal neurological exam.   Psychiatric: A&O x 4, normal affect.     Data   Data reviewed today: I reviewed all medications, new labs and imaging results over the last 24 hours. I personally reviewed the chest x-ray image(s) showing no acute pathology.    Recent Labs   Lab 07/02/21  1500   WBC 16.5*   HGB 14.2   MCV 88      *   POTASSIUM 3.8   CHLORIDE 96*   CO2 27   BUN 21   CR 1.04   ANIONGAP 8   SKYE 8.9   *

## 2021-07-03 NOTE — PROVIDER NOTIFICATION
DATE:  7/3/2021   TIME OF RECEIPT FROM LAB:  1164  LAB TEST:  Blood culture (drawn at [facility])  LAB VALUE:  Vial 2 (anarobic) gram negative rods  RESULTS GIVEN WITH READ-BACK TO (PROVIDER):  Abdelrahman Moctezuma MD  TIME LAB VALUE REPORTED TO PROVIDER:   2475

## 2021-07-03 NOTE — PLAN OF CARE
Most recent vitals: /63   Pulse 65   Temp 98.9  F (37.2  C) (Tympanic)   Resp 17   Wt 73.6 kg (162 lb 4.1 oz)   SpO2 97%   BMI 25.04 kg/m       A&O, makes needs known, uses call light appropriately, up to bathroom to void, independent in room. Afebrile. Denies pain. Lungs clear. IV  ml/hr. Patient slept part of this shift, states would like something better for sleep as melatonin does not work well. Call light and personal items within reach.     Face to face report given with opportunity to observe patient.    Report given to KRISTI Vallejo RN   7/3/2021  7:07 AM

## 2021-07-03 NOTE — PLAN OF CARE
Most recent vitals: /50   Pulse 61   Temp 98.6  F (37  C) (Tympanic)   Resp 16   Wt 73.6 kg (162 lb 4.1 oz)   SpO2 95%   BMI 25.04 kg/m    Notable redness noted to right eye; no drainage and denies vision changes. Provider notified.   Pain Management:  Denied pain   LOC: A&O  Cardiac: Tele report per ICU: BP stable & HRR  Respiratory:  Room air   GI: Denies nausea   : Voiding without difficulty. Output as charted  Skin Issues:  As charted    IVF:  LR Running @ 125 ml/hr  ABX:  IV Rocephin scheduled for next shift    Nutrition: Reg  ADL's: Independent in room   Safety:  Call light within reach & able to make needs known.     Patient brought in home eye drops from cataract surgery. In room; awaiting orders to be able to administer them.     Face to face report given with opportunity to observe patient.    Report given to Jayne Ivory RN   7/3/2021  3:26 PM

## 2021-07-03 NOTE — PHARMACY
[facility]    Pharmacy      Antimicrobial Stewardship Note     Current antimicrobial therapy:  Anti-infectives (From now, onward)    Start     Dose/Rate Route Frequency Ordered Stop    07/03/21 1600  cefTRIAXone IN D5W (ROCEPHIN) intermittent infusion 2 g      2 g  100 mL/hr over 30 Minutes Intravenous EVERY 24 HOURS 07/03/21 0925            Indication: UTI, sepsis    Days of Therapy: 2     Pertinent labs:  Creatinine   Creatinine   Date Value Ref [facility] Status   07/02/2021 1.04 0.70 - 1.30 mg/dL Final   09/10/2020 0.96 0.70 - 1.30 mg/dL Final   06/26/2019 0.89 0.70 - 1.30 mg/dL Final     WBC   WBC   Date Value Ref Range Status   07/02/2021 16.5 (H) 4.0 - 11.0 10e9/L Final   06/15/2018 9.3 4.0 - 11.0 10e9/L Final     Procalcitonin No results found for: PCAL  CRP No results found for: CRP    Culture Results:   (7/2/21) Blood = GNR (1 of 4 bottles)  (7/2/21) Aerobic Urine = non lactose fermenting GNR  (7/2/21) COVID = negative     Recommendations/Interventions:  1. None at this time.    Lesia Byers, [facility]  July 3, 2021

## 2021-07-03 NOTE — PLAN OF CARE
[facility] Inpatient Admission Note:    Patient admitted to 3112/3112-2 at approximately 2115 via cart accompanied by transport tech from [facility] . Report received from KRISTI Roy in SBAR format at 1852 via telephone. Patient ambulated to bed via self.. Patient is alert and oriented X 3, denies pain; rates at 0 on 0-10 scale.  Patient oriented to room, unit, hourly rounding, and plan of care. Explained admission packet and patient handbook with patient bill of rights brochure. Will continue to monitor and document as needed.     Inpatient Nursing criteria listed below was met:    Health care directives status obtained and documented: Yes    Patient identifies a surrogate decision maker: No If yes, who:N/A Contact Information:N/A     If initial lactic acid >2.0, repeat lactic acid drawn within one hour of arrival to unit: NA. If no, state reason: N/A    Clergy visit ordered if patient requests: N/A    Skin issues/needs documented: N/A    Isolation Patient: no Education given, correct sign in place and documentation row added to PCS:  No    Fall Prevention No: Care plan updated, education given and documented, sticker and magnet in place: N/A    Care Plan initiated: Yes    Education Documented (including assessment): Yes    Patient has discharge needs : No If yes, please explain:N/A

## 2021-07-04 LAB
ALBUMIN SERPL-MCNC: 3.2 G/DL (ref 3.4–5)
ALP SERPL-CCNC: 76 U/L (ref 40–150)
ALT SERPL W P-5'-P-CCNC: 32 U/L (ref 0–70)
ANION GAP SERPL CALCULATED.3IONS-SCNC: 4 MMOL/L (ref 3–14)
AST SERPL W P-5'-P-CCNC: 27 U/L (ref 0–45)
BACTERIA SPEC CULT: ABNORMAL
BASOPHILS # BLD AUTO: 0 10E9/L (ref 0–0.2)
BASOPHILS NFR BLD AUTO: 0 %
BILIRUB SERPL-MCNC: 0.3 MG/DL (ref 0.2–1.3)
BUN SERPL-MCNC: 16 MG/DL (ref 7–30)
CALCIUM SERPL-MCNC: 9.2 MG/DL (ref 8.5–10.1)
CHLORIDE SERPL-SCNC: 105 MMOL/L (ref 94–109)
CO2 SERPL-SCNC: 26 MMOL/L (ref 20–32)
CREAT SERPL-MCNC: 0.86 MG/DL (ref 0.66–1.25)
CRP SERPL-MCNC: 113 MG/L (ref 0–8)
DIFFERENTIAL METHOD BLD: NORMAL
EOSINOPHIL # BLD AUTO: 0 10E9/L (ref 0–0.7)
EOSINOPHIL NFR BLD AUTO: 0 %
ERYTHROCYTE [DISTWIDTH] IN BLOOD BY AUTOMATED COUNT: 12.5 % (ref 10–15)
GFR SERPL CREATININE-BSD FRML MDRD: 85 ML/MIN/{1.73_M2}
GLUCOSE SERPL-MCNC: 104 MG/DL (ref 70–99)
HCT VFR BLD AUTO: 40.9 % (ref 40–53)
HGB BLD-MCNC: 14.1 G/DL (ref 13.3–17.7)
LYMPHOCYTES # BLD AUTO: 0.9 10E9/L (ref 0.8–5.3)
LYMPHOCYTES NFR BLD AUTO: 13 %
MCH RBC QN AUTO: 30.6 PG (ref 26.5–33)
MCHC RBC AUTO-ENTMCNC: 34.5 G/DL (ref 31.5–36.5)
MCV RBC AUTO: 89 FL (ref 78–100)
MONOCYTES # BLD AUTO: 0.9 10E9/L (ref 0–1.3)
MONOCYTES NFR BLD AUTO: 12 %
NEUTROPHILS # BLD AUTO: 5.4 10E9/L (ref 1.6–8.3)
NEUTROPHILS NFR BLD AUTO: 75 %
PLATELET # BLD AUTO: 155 10E9/L (ref 150–450)
POTASSIUM SERPL-SCNC: 3.8 MMOL/L (ref 3.4–5.3)
PROCALCITONIN SERPL-MCNC: 7.26 NG/ML
PROT SERPL-MCNC: 6.9 G/DL (ref 6.8–8.8)
RBC # BLD AUTO: 4.61 10E12/L (ref 4.4–5.9)
SODIUM SERPL-SCNC: 135 MMOL/L (ref 133–144)
SPECIMEN SOURCE: ABNORMAL
WBC # BLD AUTO: 7.2 10E9/L (ref 4–11)

## 2021-07-04 PROCEDURE — 99232 SBSQ HOSP IP/OBS MODERATE 35: CPT | Performed by: INTERNAL MEDICINE

## 2021-07-04 PROCEDURE — 250N000013 HC RX MED GY IP 250 OP 250 PS 637: Performed by: INTERNAL MEDICINE

## 2021-07-04 PROCEDURE — 80053 COMPREHEN METABOLIC PANEL: CPT | Performed by: INTERNAL MEDICINE

## 2021-07-04 PROCEDURE — 36415 COLL VENOUS BLD VENIPUNCTURE: CPT | Performed by: INTERNAL MEDICINE

## 2021-07-04 PROCEDURE — 250N000011 HC RX IP 250 OP 636: Performed by: INTERNAL MEDICINE

## 2021-07-04 PROCEDURE — 85025 COMPLETE CBC W/AUTO DIFF WBC: CPT | Performed by: INTERNAL MEDICINE

## 2021-07-04 PROCEDURE — 84145 PROCALCITONIN (PCT): CPT | Performed by: INTERNAL MEDICINE

## 2021-07-04 PROCEDURE — 120N000001 HC R&B MED SURG/OB

## 2021-07-04 PROCEDURE — 86140 C-REACTIVE PROTEIN: CPT | Performed by: INTERNAL MEDICINE

## 2021-07-04 RX ORDER — AMLODIPINE BESYLATE 5 MG/1
5 TABLET ORAL DAILY
Status: DISCONTINUED | OUTPATIENT
Start: 2021-07-04 | End: 2021-07-05

## 2021-07-04 RX ADMIN — AMLODIPINE BESYLATE 2.5 MG: 2.5 TABLET ORAL at 00:29

## 2021-07-04 RX ADMIN — FAMOTIDINE 10 MG: 10 TABLET ORAL at 08:32

## 2021-07-04 RX ADMIN — LOSARTAN POTASSIUM 50 MG: 50 TABLET, FILM COATED ORAL at 08:30

## 2021-07-04 RX ADMIN — PREDNISOLONE ACETATE 1 DROP: 10 SUSPENSION/ DROPS OPHTHALMIC at 13:31

## 2021-07-04 RX ADMIN — PREDNISOLONE ACETATE 1 DROP: 10 SUSPENSION/ DROPS OPHTHALMIC at 08:34

## 2021-07-04 RX ADMIN — CARBOXYMETHYLCELLULOSE SODIUM 1 DROP: 5 SOLUTION/ DROPS OPHTHALMIC at 21:02

## 2021-07-04 RX ADMIN — PREDNISOLONE ACETATE 1 DROP: 10 SUSPENSION/ DROPS OPHTHALMIC at 21:03

## 2021-07-04 RX ADMIN — FAMOTIDINE 10 MG: 10 TABLET ORAL at 21:02

## 2021-07-04 RX ADMIN — MONTELUKAST 10 MG: 10 TABLET, FILM COATED ORAL at 21:02

## 2021-07-04 RX ADMIN — CLOPIDOGREL BISULFATE 75 MG: 75 TABLET ORAL at 08:32

## 2021-07-04 RX ADMIN — ZOLPIDEM TARTRATE 5 MG: 5 TABLET, FILM COATED ORAL at 21:03

## 2021-07-04 RX ADMIN — HYDROCHLOROTHIAZIDE 25 MG: 12.5 CAPSULE ORAL at 08:31

## 2021-07-04 RX ADMIN — ACETAMINOPHEN 650 MG: 325 TABLET, FILM COATED ORAL at 21:04

## 2021-07-04 RX ADMIN — AMLODIPINE BESYLATE 5 MG: 5 TABLET ORAL at 08:29

## 2021-07-04 RX ADMIN — CARBOXYMETHYLCELLULOSE SODIUM 1 DROP: 5 SOLUTION/ DROPS OPHTHALMIC at 08:35

## 2021-07-04 RX ADMIN — CEFTRIAXONE SODIUM 2 G: 2 INJECTION, SOLUTION INTRAVENOUS at 15:46

## 2021-07-04 RX ADMIN — MELATONIN 6 MG: 3 TAB ORAL at 21:02

## 2021-07-04 RX ADMIN — NEPAFENAC 1 DROP: 3 SUSPENSION OPHTHALMIC at 13:30

## 2021-07-04 RX ADMIN — ATORVASTATIN CALCIUM 20 MG: 20 TABLET, FILM COATED ORAL at 08:32

## 2021-07-04 ASSESSMENT — ACTIVITIES OF DAILY LIVING (ADL)
ADLS_ACUITY_SCORE: 16

## 2021-07-04 NOTE — PROVIDER NOTIFICATION
DATE:  7/4/2021   TIME OF RECEIPT FROM LAB:  1122  LAB TEST:  PCal  LAB VALUE:  7.26  RESULTS GIVEN WITH READ-BACK TO (PROVIDER):  Elvis Robles MD  TIME LAB VALUE REPORTED TO PROVIDER:   1120

## 2021-07-04 NOTE — PLAN OF CARE
Upon general assessment, patient is alert oriented and very pleasant, patient denies pain, is afebrile, patient able to ambulate in his room with no assist, patient is steady on his feet, patient did ambulate all around the third floor independently and tolerated well, patient has been voiding well with no difficulty, patient no longer has the rigors or shaking chills, patient given a sepsis hand out and verbalizes an understanding.

## 2021-07-04 NOTE — PLAN OF CARE
Face to face report given with opportunity to observe patient.    Report given to KRISTI Jolly RN   7/4/2021  7:06 AM

## 2021-07-04 NOTE — PROGRESS NOTES
[facility]    Medicine Progress Note - Hospitalist Service       Date of Admission:  7/2/2021    Assessment & Plan                         Jared Parry is a 75 year old male with h/o htn, hyperlipidemia, PVD admitted on 7/2/2021. He originally presented to [facility] with fever, chills and diagnosed UTI/sepsis. Clinically septic and found to have GNR in blood    Problem list:   1. Sepsis: GNR bacteremia due to UTI. Will treat UTI with Rocephin, IVF, f/u cultures.     2. UTI: clinically cystitis. H/o BPH. .     3. PVD with h/o arterial thrombosis. Continue Plavix, atorvastatin. No limb ischemia    4. HTN/hld: continue amlodipine, atorrvastatin  losartan. Hydrochlorothiazide     5. DUNIA: uses CPAP. Will call RT to evaluate.     6. HypoNatremia likely 2/2 hypovolemia         Diet: Combination Diet 2 gm NA Diet; Low Saturated Fat Na <2400mg Diet    DVT Prophylaxis: ambulatory  Allison Catheter: Not present  Central Lines: None  Code Status: Full Code      Disposition Plan   Expected discharge: Tomorrow, recommended to prior living arrangement once SIRS/Sepsis treated.     The patient's care was discussed with the Patient.    Elvis Robles MD  Hospitalist Service  [facility]  Securely message with the Vocera Web Console (learn more here)  Text page via [employer] Paging/Directory      Risk Factors Present on Admission               ______________________________________________________________________    Interval History   Denies chest pain, dyspnea, nausea, vomiting, fevers, chills, tolertaing Po ambulating well    Data reviewed today: I reviewed all medications, new labs and imaging results over the last 24 hours. I personally reviewed no images or EKG's today.    Physical Exam   Vital Signs: Temp: 98.7  F (37.1  C) Temp src: Tympanic BP: 172/94 Pulse: 86   Resp: 18 SpO2: 96 % O2 Device: None (Room air)    Weight: 162 lbs 4.14 oz  Constitutional: awake, alert, cooperative, no apparent  distress, and appears stated age  Respiratory: No increased work of breathing, good air exchange, clear to auscultation bilaterally, no crackles or wheezing  Cardiovascular: Normal apical impulse, regular rate and rhythm, normal S1 and S2, no S3 or S4, and no murmur noted,    GI: No scars, normal bowel sounds, soft, non-distended, non-tender, no masses palpated, no hepatosplenomegally  Skin: no bruising or bleeding  Neurologic: Awake, alert, oriented to name, place and time.  Cranial nerves II-XII are grossly intact.  Motor is 5 out of 5 bilaterally.  Cerebellar finger to nose, heel to shin intact.  Sensory is intact.  Babinski down going, Romberg negative, and gait is normal.    Data   Recent Labs   Lab 07/02/21  1500   WBC 16.5*   HGB 14.2   MCV 88      *   POTASSIUM 3.8   CHLORIDE 96*   CO2 27   BUN 21   CR 1.04   ANIONGAP 8   SKYE 8.9   *     No results found for this or any previous visit (from the past 24 hour(s)).  Medications       amLODIPine  5 mg Oral Daily     atorvastatin  20 mg Oral Daily     carboxymethylcellulose PF  1 drop Both Eyes BID     cefTRIAXone  2 g Intravenous Q24H     clopidogrel  75 mg Oral Daily     famotidine  10 mg Oral BID     hydrochlorothiazide  25 mg Oral Daily     losartan  50 mg Oral Daily     melatonin  6 mg Oral At Bedtime     montelukast  10 mg Oral At Bedtime     nepafenac  1 drop Left Eye Daily     prednisoLONE acetate  1 drop Left Eye TID     sodium chloride (PF)  3 mL Intracatheter Q8H

## 2021-07-04 NOTE — PLAN OF CARE
"Patient did get a notice on his \"my chart\" that he had an elevated procalcitonin, this writer did go in with sepsis hand outs and we did discuss sepsis at length, patient and his wife did verbalize an understanding.   "

## 2021-07-04 NOTE — PLAN OF CARE
Face to face report given with opportunity to observe patient.    Report given to Hattie French RN   7/4/2021  3:21 PM

## 2021-07-04 NOTE — PROGRESS NOTES
Spoke with patient and wife brought in CPAP machine and patient states he will set it up himself and brought own water for machine.  Informed patient if he needs anything have them call RT.

## 2021-07-04 NOTE — PLAN OF CARE
"Patient is alert and oriented, makes needs known. Up in room and halls independently. BP high, fluids stopped per Dr. Moctezuma. PRN tylenol given for discomfort, PRN Ambien given for sleep. Assessment as charted. BP (!) 188/87   Pulse 70   Temp 99.9  F (37.7  C) (Tympanic)   Resp 18   Ht 1.702 m (5' 7\")   Wt 73.6 kg (162 lb 4.1 oz)   SpO2 96%   BMI 25.41 kg/m      Face to face report given with opportunity to observe patient.    Report given to KRISTI Mcadniel, KRISTI   7/3/2021  11:05 PM    "

## 2021-07-04 NOTE — PLAN OF CARE
"Most recent vitals: /84   Pulse 85   Temp 98.5  F (36.9  C) (Tympanic)   Resp 17   Ht 1.702 m (5' 7\")   Wt 73.6 kg (162 lb 4.1 oz)   SpO2 97%   BMI 25.41 kg/m       A&O, makes needs known, uses call light appropriately, uses urinal to void, independent in room. Afebrile. Denies pain. Lungs clear, home CPAP machine used. Tele: SR 60s. IV saline locked. Patient slept this shift. [place] light and personal items within reach.     Face to face report given with opportunity to observe patient.    Report given to KRISTI Godoy RN   7/4/2021  4:11 AM      "

## 2021-07-05 VITALS
HEIGHT: 67 IN | DIASTOLIC BLOOD PRESSURE: 75 MMHG | BODY MASS INDEX: 25.47 KG/M2 | RESPIRATION RATE: 16 BRPM | OXYGEN SATURATION: 98 % | TEMPERATURE: 97.6 F | SYSTOLIC BLOOD PRESSURE: 160 MMHG | HEART RATE: 72 BPM | WEIGHT: 162.26 LBS

## 2021-07-05 LAB
CRP SERPL-MCNC: 72.6 MG/L (ref 0–8)
INTERPRETATION ECG - MUSE: NORMAL
PROCALCITONIN SERPL-MCNC: 4.46 NG/ML

## 2021-07-05 PROCEDURE — 36415 COLL VENOUS BLD VENIPUNCTURE: CPT | Performed by: INTERNAL MEDICINE

## 2021-07-05 PROCEDURE — 250N000013 HC RX MED GY IP 250 OP 250 PS 637: Performed by: INTERNAL MEDICINE

## 2021-07-05 PROCEDURE — 84145 PROCALCITONIN (PCT): CPT | Performed by: INTERNAL MEDICINE

## 2021-07-05 PROCEDURE — 99239 HOSP IP/OBS DSCHRG MGMT >30: CPT | Performed by: INTERNAL MEDICINE

## 2021-07-05 PROCEDURE — 86140 C-REACTIVE PROTEIN: CPT | Performed by: INTERNAL MEDICINE

## 2021-07-05 RX ORDER — AMLODIPINE BESYLATE 10 MG/1
10 TABLET ORAL DAILY
Qty: 60 TABLET | Refills: 1 | Status: SHIPPED | OUTPATIENT
Start: 2021-07-06 | End: 2021-07-13

## 2021-07-05 RX ORDER — LOSARTAN POTASSIUM 100 MG/1
100 TABLET ORAL DAILY
Qty: 60 TABLET | Refills: 1 | Status: SHIPPED | OUTPATIENT
Start: 2021-07-06 | End: 2021-07-13

## 2021-07-05 RX ORDER — AMLODIPINE BESYLATE 5 MG/1
10 TABLET ORAL DAILY
Status: DISCONTINUED | OUTPATIENT
Start: 2021-07-05 | End: 2021-07-05 | Stop reason: HOSPADM

## 2021-07-05 RX ORDER — HYDROCHLOROTHIAZIDE 12.5 MG/1
25 CAPSULE ORAL DAILY
Qty: 60 CAPSULE | Refills: 1 | Status: SHIPPED | OUTPATIENT
Start: 2021-07-06 | End: 2021-07-13

## 2021-07-05 RX ORDER — LOSARTAN POTASSIUM 100 MG/1
100 TABLET ORAL DAILY
Status: DISCONTINUED | OUTPATIENT
Start: 2021-07-05 | End: 2021-07-05 | Stop reason: HOSPADM

## 2021-07-05 RX ORDER — LOSARTAN POTASSIUM 50 MG/1
50 TABLET ORAL DAILY
Status: DISCONTINUED | OUTPATIENT
Start: 2021-07-05 | End: 2021-07-05

## 2021-07-05 RX ORDER — HYDROCHLOROTHIAZIDE 12.5 MG/1
12.5 CAPSULE ORAL DAILY
Status: DISCONTINUED | OUTPATIENT
Start: 2021-07-05 | End: 2021-07-05

## 2021-07-05 RX ORDER — AMLODIPINE BESYLATE 5 MG/1
5 TABLET ORAL DAILY
Status: DISCONTINUED | OUTPATIENT
Start: 2021-07-05 | End: 2021-07-05

## 2021-07-05 RX ADMIN — ATORVASTATIN CALCIUM 20 MG: 20 TABLET, FILM COATED ORAL at 09:04

## 2021-07-05 RX ADMIN — CARBOXYMETHYLCELLULOSE SODIUM 1 DROP: 5 SOLUTION/ DROPS OPHTHALMIC at 09:20

## 2021-07-05 RX ADMIN — PREDNISOLONE ACETATE 1 DROP: 10 SUSPENSION/ DROPS OPHTHALMIC at 09:09

## 2021-07-05 RX ADMIN — FAMOTIDINE 10 MG: 10 TABLET ORAL at 09:04

## 2021-07-05 RX ADMIN — AMOXICILLIN AND CLAVULANATE POTASSIUM 1 TABLET: 875; 125 TABLET, FILM COATED ORAL at 10:29

## 2021-07-05 RX ADMIN — CLOPIDOGREL BISULFATE 75 MG: 75 TABLET ORAL at 09:06

## 2021-07-05 RX ADMIN — LOSARTAN POTASSIUM 100 MG: 100 TABLET, FILM COATED ORAL at 09:04

## 2021-07-05 RX ADMIN — AMLODIPINE BESYLATE 10 MG: 5 TABLET ORAL at 09:07

## 2021-07-05 RX ADMIN — HYDROCHLOROTHIAZIDE 25 MG: 12.5 CAPSULE ORAL at 09:06

## 2021-07-05 ASSESSMENT — ACTIVITIES OF DAILY LIVING (ADL)
ADLS_ACUITY_SCORE: 16

## 2021-07-05 NOTE — PLAN OF CARE
"Patient is alert and oriented, up independently in room. IV Rocephin given as ordered. Voiding without difficulty. Assessment as charted. /82   Pulse 71   Temp 98.3  F (36.8  C) (Tympanic)   Resp 16   Ht 1.702 m (5' 7\")   Wt 73.6 kg (162 lb 4.1 oz)   SpO2 96%   BMI 25.41 kg/m      Face to face report given with opportunity to observe patient.    Report given to KRISTI Mcdaniel RN   7/4/2021  11:13 PM    "

## 2021-07-05 NOTE — PLAN OF CARE
"Most recent vitals: /62   Pulse 59   Temp 97.6  F (36.4  C) (Tympanic)   Resp 17   Ht 1.702 m (5' 7\")   Wt 73.6 kg (162 lb 4.1 oz)   SpO2 97%   BMI 25.41 kg/m       A&O, makes needs known, uses call light appropriately, up to bathroom to void, independent in room. Afebrile. Denies pain. Lungs clear. IV saline locked. Home CPAP machine used. Tele: SR 60's. Patient slept this shift. Call light and personal items within reach.    Face to face report given with opportunity to observe patient.    Report given to Vicki Wise, RNs    Sultana Plaza RN   7/5/2021  7:15 AM     "

## 2021-07-05 NOTE — PROGRESS NOTES
[facility]    Medicine Progress Note - Hospitalist Service       Date of Admission:  7/2/2021    Assessment & Plan                                    Jared Parry is a 75 year old male with h/o htn, hyperlipidemia, PVD admitted on 7/2/2021. He originally presented to [facility] with fever, chills and diagnosed UTI/sepsis. Clinically septic and found to have GNR in blood    Problem list:   1. Sepsis: GNR bacteremia due to UTI. Will treat UTI with Rocephin,  Doing well asking to go home ASAP     2. UTI: clinically cystitis. H/o BPH. .     3. PVD with h/o arterial thrombosis. Continue Plavix, atorvastatin. No limb ischemia    4. HTN/hld: continue amlodipine, atorvastatin  losartan. Hydrochlorothiazide   Will increase the dose losartan and amlodipine    5. DUNIA: uses CPAP. Will call RT to evaluate.     6. HypoNatremia 2/2 hypovolemia resolved           Diet: Combination Diet 2 gm NA Diet; Low Saturated Fat Na <2400mg Diet    DVT Prophylaxis: ambulatory  Allison Catheter: Not present  Central Lines: None  Code Status: Full Code      Disposition Plan   Expected discharge: await culture results     The patient's care was discussed with the Patient.    Elvis Robles MD  Hospitalist Service  [facility]  Securely message with the Vocera Web Console (learn more here)  Text page via [employer] Paging/Directory      Risk Factors Present on Admission               ______________________________________________________________________    Interval History   Wondering when he can go home pacing in halls  States BP high and he thinks that may have caused some nausea this am but refuses medication  Denies chest pain, dyspnea, fevres, chills, nausea, amesis    Data reviewed today: I reviewed all medications, new labs and imaging results over the last 24 hours. I personally reviewed     Physical Exam   Vital Signs: Temp: 97.5  F (36.4  C) Temp src: Tympanic BP: 175/84 Pulse: 71   Resp: 18 SpO2: 97 % O2  Device: None (Room air)    Weight: 162 lbs 4.14 oz  Constitutional: awake, alert, cooperative, no apparent distress, and appears stated age  Respiratory: No increased work of breathing, good air exchange, clear to auscultation bilaterally, no crackles or wheezing  Cardiovascular: Normal apical impulse, regular rate and rhythm, normal S1 and S2, no S3 or S4, and no murmur noted  GI: No scars, normal bowel sounds, soft, non-distended, non-tender, no masses palpated, no hepatosplenomegally  Skin: no bruising or bleeding  Neurologic: Awake, alert, oriented to name, place and time.  Cranial nerves II-XII are grossly intact.  Motor is 5 out of 5 bilaterally.  Cerebellar finger to nose, heel to shin intact.  Sensory is intact.  Babinski down going, Romberg negative, and gait is normal.    Data   Recent Labs   Lab 07/04/21  1045 07/02/21  1500   WBC 7.2 16.5*   HGB 14.1 14.2   MCV 89 88    191    131*   POTASSIUM 3.8 3.8   CHLORIDE 105 96*   CO2 26 27   BUN 16 21   CR 0.86 1.04   ANIONGAP 4 8   SKYE 9.2 8.9   * 114*   ALBUMIN 3.2*  --    PROTTOTAL 6.9  --    BILITOTAL 0.3  --    ALKPHOS 76  --    ALT 32  --    AST 27  --      No results found for this or any previous visit (from the past 24 hour(s)).  Medications       amLODIPine  10 mg Oral Daily     atorvastatin  20 mg Oral Daily     carboxymethylcellulose PF  1 drop Both Eyes BID     cefTRIAXone  2 g Intravenous Q24H     clopidogrel  75 mg Oral Daily     famotidine  10 mg Oral BID     hydrochlorothiazide  25 mg Oral Daily     losartan  100 mg Oral Daily     melatonin  6 mg Oral At Bedtime     montelukast  10 mg Oral At Bedtime     nepafenac  1 drop Left Eye Daily     prednisoLONE acetate  1 drop Left Eye TID     sodium chloride (PF)  3 mL Intracatheter Q8H

## 2021-07-05 NOTE — DISCHARGE SUMMARY
[facility]  Hospitalist Discharge Summary      Date of Admission:  7/2/2021  Date of Discharge:  7/5/2021  Discharging Provider: Elvis Robles MD      Discharge Diagnoses   E COLi UTI/bacteremia  sepsis    Follow-ups Needed After Discharge   Follow-up Appointments     Follow-up and recommended labs and tests       Follow up with primary care provider, DONNIE GARCIA, within 7 days for   hospital follow- up.  No follow up labs or test are needed.         {Additional follow-up instructions/to-do's for PCP        Unresulted Labs Ordered in the Past 30 Days of this Admission     Date and Time Order Name Status Description    7/2/2021 1631 Blood culture Preliminary     7/2/2021 1631 Blood culture Preliminary       These results will be followed up by     Discharge Disposition   Discharged to home  Condition at discharge: Stable      Hospital Course                                  Jared Parry is a 75 year old male with h/o htn, hyperlipidemia, PVD admitted on 7/2/2021. He originally presented to [facility] with fever, chills and diagnosed UTI/sepsis. Clinically septic and found to have GNR in blood    Problem list:   1. Sepsis: GNR bacteremia due to E Coli UTI. Treated  with Rocephin, will transition to augmentin to complete an 14 day coarse of abx    2. UTI: clinically cystitis. H/o BPH. . F/U with urology    3. PVD with h/o arterial thrombosis. Continue Plavix, atorvastatin. No limb ischemia    4. HTN/hld: continue atorvastatin  Increase dose Losartan and Hydrochlorothiazide and add amlodipine  as BP's inpatient were consistently running high    5. DUNIA: uses CPAP. Will call RT to evaluate.     6. HypoNatremia 2/2 hypovolemia resolved          Consultations This Hospital Stay   RESPIRATORY CARE IP CONSULT    Code Status   Full Code    Time Spent on this Encounter   I, Elvis Robles MD, personally saw the patient today and spent greater than 30 minutes discharging this patient.       Elvis  MD Travis  [facility]  [address] [place] [address]  [place] [address]  Phone: [phone]  Fax: [phone]  ______________________________________________________________________    Physical Exam   Vital Signs: Temp: 97.6  F (36.4  C) Temp src: Tympanic BP: 160/75 Pulse: 72   Resp: 16 SpO2: 98 % O2 Device: None (Room air)    Weight: 162 lbs 4.14 oz  Constitutional: awake, alert, cooperative, no apparent distress, and appears stated age  Hematologic / Lymphatic: no cervical lymphadenopathy  Respiratory: No increased work of breathing, good air exchange, clear to auscultation bilaterally, no crackles or wheezing  Cardiovascular: Normal apical impulse, regular rate and rhythm, normal S1 and S2, no S3 or S4, and no murmur noted  GI: No scars, normal bowel sounds, soft, non-distended, non-tender, no masses palpated, no hepatosplenomegally  Musculoskeletal: There is no redness, warmth, or swelling of the joints.  Full range of motion noted.  Motor strength is 5 out of 5 all extremities bilaterally.  Tone is normal.  Neurologic: Awake, alert, oriented to name, place and time.  Cranial nerves II-XII are grossly intact.  Motor is 5 out of 5 bilaterally.  Cerebellar finger to nose, heel to shin intact.  Sensory is intact.  Babinski down going, Romberg negative, and gait is normal.  Rectal reveals a non-boggy prostate that is firm       Primary Care Physician   DONNIE GARCIA    Discharge Orders      Reason for your hospital stay    Urinary infection with sepsis     Follow-up and recommended labs and tests     Follow up with primary care provider, DONNIE GARCIA, within 7 days for hospital follow- up.  No follow up labs or test are needed.     Activity    Your activity upon discharge: activity as tolerated     Full Code     Diet    Follow this diet upon discharge: Orders Placed This Encounter      Combination Diet 2 gm NA Diet; Low Saturated Fat Na <2400mg Diet       Significant Results and Procedures   Most Recent 3  CBC's:  Recent Labs   Lab Test 07/04/21  1045 07/02/21  1500 06/15/18  1432   WBC 7.2 16.5* 9.3   HGB 14.1 14.2 14.6   MCV 89 88 87    191 199     Most Recent 3 BMP's:  Recent Labs   Lab Test 07/04/21  1045 07/02/21  1500 09/10/20  0750    131* 136   POTASSIUM 3.8 3.8 4.2   CHLORIDE 105 96* 99   CO2 26 27 32*   BUN 16 21 22   CR 0.86 1.04 0.96   ANIONGAP 4 8 5   SKYE 9.2 8.9 9.5   * 114* 101     Most Recent 2 LFT's:  Recent Labs   Lab Test 07/04/21  1045 09/10/20  0750   AST 27 21   ALT 32 20   ALKPHOS 76 70   BILITOTAL 0.3 0.6       Discharge Medications   Current Discharge Medication List      START taking these medications    Details   amLODIPine (NORVASC) 10 MG tablet Take 1 tablet (10 mg) by mouth daily  Qty: 60 tablet, Refills: 1    Associated Diagnoses: Essential hypertension      amoxicillin-clavulanate (AUGMENTIN) 875-125 MG tablet Take 1 tablet by mouth every 12 hours  Qty: 22 tablet, Refills: 0    Associated Diagnoses: Sepsis, due to unspecified organism, unspecified whether acute organ dysfunction present (H)      hydrochlorothiazide (MICROZIDE) 12.5 MG capsule Take 2 capsules (25 mg) by mouth daily  Qty: 60 capsule, Refills: 1    Associated Diagnoses: Essential hypertension         CONTINUE these medications which have CHANGED    Details   losartan (COZAAR) 100 MG tablet Take 1 tablet (100 mg) by mouth daily  Qty: 60 tablet, Refills: 1    Associated Diagnoses: Essential hypertension         CONTINUE these medications which have NOT CHANGED    Details   atorvastatin (LIPITOR) 20 MG tablet Take 1 tablet (20 mg) by mouth daily  Qty: 90 tablet, Refills: 3    Associated Diagnoses: Atherosclerosis of native artery of left lower extremity with intermittent claudication (H)      clopidogrel (PLAVIX) 75 MG tablet Take 1 tablet (75 mg) by mouth daily  Qty: 90 tablet, Refills: 3    Associated Diagnoses: Atherosclerosis of native artery of left lower extremity with intermittent claudication (H)  "     cycloSPORINE (RESTASIS) 0.05 % ophthalmic emulsion Place 1 drop into both eyes 2 times daily  Qty:        famotidine (PEPCID) 10 MG tablet Take 1 tablet (10 mg) by mouth 2 times daily      ketorolac (ACULAR) 0.5 % ophthalmic solution Place 1 drop Into the left eye 4 times daily starting three days before surgery and then for one week after. Then 1 drop eye left three times daily for 5 weeks.      Lactobacillus (PROBIOTIC ACIDOPHILUS) TABS Take 1 tablet by mouth daily       montelukast (SINGULAIR) 10 MG tablet Take 1 tablet (10 mg) by mouth At Bedtime  Qty: 90 tablet, Refills: 3    Associated Diagnoses: Mild intermittent asthma, uncomplicated      prednisoLONE acetate (PRED FORTE) 1 % ophthalmic suspension Place 1 drop Into the left eye 4 times daily starting after surgery for one week, then 3 times daily for 5 weeks.      triamcinolone (NASACORT) 55 MCG/ACT nasal aerosol Spray 2 sprays into both nostrils daily  Qty:           STOP taking these medications       hydrochlorothiazide (HYDRODIURIL) 12.5 MG tablet Comments:   Reason for Stopping:             Allergies   Allergies   Allergen Reactions     Thiopental      Other reaction(s): *Unknown - Childhood Rxn  Patient states \"almost  from Pentothal as child\"     Cephalexin Hives     Aspirin      Other reaction(s): GI Upset     Ciprofloxacin      "

## 2021-07-05 NOTE — PLAN OF CARE
Patient discharged at 1:20 PM via ambulation accompanied by spouse and staff. Prescriptions sent to patients preferred pharmacy. All belongings sent with patient.     Discharge instructions reviewed with patient. Listed belongings gathered and returned to patient.     Patient discharged to home.   Report called to n/a    Surgical Patient   Surgical Procedures during stay: no  Did patient receive discharge instruction on wound care and recognition of infection symptoms? N/A    MISC  Follow up appointment made:   unable to make appointment due to holiday, verbalized understanding to make appointment  Home medications returned to patient: No  Patient reports pain was well managed at discharge: Yes

## 2021-07-05 NOTE — PLAN OF CARE
Prior to Admission Medication Reconciliation:     Medications added:   [x] None  [] As listed below:    Medications deleted:   [] None  [x] As listed below:    moxifloxacin- therapy complete    Medications marked for review/removal by attending:  [x] None  [] As listed below:    Changes made to existing medications:   [] None  [x] As listed below:    Input sig for eye drops    Last times/dates taken verified with patient:  [] Yes- completed myself  [] Prepared PTA medlist for review only. (will not be available to review personally)  [] Did not review with patient. Rx verification only. Review completed by nursing.    [x] Nurse completed no changes made (double checked entries)  [] Unable to review with patient at this time:  [] Nurse completed/changes made:         Allergies listed at another location:  []Not applicable   []See below:    Allergy review:    [x]Did not review: reviewed by nursing  []Did not review: pt unable at this time  []Patient/MAR verified NKDA  []Patient/MAR verified current existing allergies: no changes made    Medication reconciliation sources:   [x]Patient  []Patient family member/emergency contact: **  [][facility] Report Review  []Epic Chart Review  []Care Everywhere review  []Pharmacy med list: **  []Pharmacy phone call  [x]Outside meds dispense report: see below  []Nursing home or Assisted Living MAR:  [x]Other: requested a medlist from [facility] to confirm restasis and check for any other medication but they may not be open today.     Pharmacy desired at discharge: [facility]    Is patient on coumadin?  [x]No      Requests for consultation by provider or pharmacist:   [x] Patient understands why all of their meds were prescribed and how to take them. No questions.   [] Managing party has no questions.   [] Patient/ managing party has questions about the following:  [] Did not review with patient. Cannot assess.     Fill dates and reported compliancy:  [x] Fill dates coincide with compliancy  for all/most maintenance meds.   [] Fill dates do not coincide with compliancy with maintenance meds. See notes in PTA medlist and in comments.    [] Fill dates do not coincide with the following medications but pt reports compliancy:  [] Did not review with patient. Cannot assess.     Historian accuracy:  [x] Excellent- alert and oriented, understands why meds were prescribed and how to take, able to answer specifics  [] Good- alert and oriented, understands why meds were prescribed and how to take, some confusion   [] Fair- alert and oriented, doesn't know medications without list, cannot answer specifics about medications, but has a decent process for which to take at home  [] Poor- does not know medications, may not have a process to take at home, may be cognitively unable to review at this time  []Medication management done by family member or facility, no concerns about historian accuracy.   [] Did not review with patient. Cannot assess.     Medication Management:  [x] Manages meds independently  [] Family member/ other party manages meds:  [] Meds managed by staff at facility  [] Meds set up by home care, family/other party helps administer  [] Meds set up by home care, self administers  [] Did not review with patient. Cannot assess.     Other medications aside from PTA:  [x] Denies taking any medications aside from those listed in PTA meds  [] Reports taking another medication(s) but cannot recall the name(s)  [] Refuses to say.  [] Did not review with patient. Cannot assess.     Comments: none.     Billie Schultz on 7/5/2021 at 7:53 AM       Discrepancies: [x] No []Not Applicable []Yes: listed below    Time spent on medication reconciliation:   [x]5-20 mins  []20-40 mins  []> 40 mins    Issues completing PTA medication reconciliation:  [] On hold for a long time  [] Waited for a call back  [] Fax didn't come through  [] Fax took a long time  [] Other:    Notifying appropriate party of  changes/additions/discrepancies:  [x]No pertinent changes made, notification not necessary.   [] Notified attending provider via text page/phone call  [] Notified attending provider in person  [] Notified pharmacy  [] Notified nurse  [] Attending provider not available, left detailed notes  [] Pt is not admitted to floor yet, PTA meds completed before admission.        Medications Prior to Admission   Medication Sig Dispense Refill Last Dose     atorvastatin (LIPITOR) 20 MG tablet Take 1 tablet (20 mg) by mouth daily (Patient taking differently: Take 20 mg by mouth At Bedtime ) 90 tablet 3 7/1/2021 at 2000     clopidogrel (PLAVIX) 75 MG tablet Take 1 tablet (75 mg) by mouth daily (Patient taking differently: Take 75 mg by mouth At Bedtime ) 90 tablet 3 7/1/2021 at 2000     cycloSPORINE (RESTASIS) 0.05 % ophthalmic emulsion Place 1 drop into both eyes 2 times daily   7/2/2021 at 1300     famotidine (PEPCID) 10 MG tablet Take 1 tablet (10 mg) by mouth 2 times daily   7/2/2021 at 1900     hydrochlorothiazide (HYDRODIURIL) 12.5 MG tablet Take 1 tablet (12.5 mg) by mouth daily 90 tablet 3 7/2/2021 at 0800     ketorolac (ACULAR) 0.5 % ophthalmic solution Place 1 drop Into the left eye 4 times daily . Starting three days before surgery and then for one week after. Then 1 drop eye left three times daily for 5 weeks.   7/2/2021 at Unknown time     Lactobacillus (PROBIOTIC ACIDOPHILUS) TABS Take 1 tablet by mouth daily    7/2/2021 at 0800     losartan (COZAAR) 50 MG tablet Take 1 tablet (50 mg) by mouth daily 90 tablet 3 7/2/2021 at 0800     montelukast (SINGULAIR) 10 MG tablet Take 1 tablet (10 mg) by mouth At Bedtime 90 tablet 3 7/1/2021 at 2000     prednisoLONE acetate (PRED FORTE) 1 % ophthalmic suspension Place 1 drop Into the left eye 4 times daily starting after surgery for one week, then 3 times daily for 5 weeks.   7/1/2021 at Unknown time     triamcinolone (NASACORT) 55 MCG/ACT nasal aerosol Spray 2 sprays into both  nostrils daily   7/2/2021 at 0800       Medication Dispense History (from 7/5/2020 to 7/2/2021)  Expand All  Collapse All  Amoxicillin-Pot Clavulanate   Dispensed Days Supply Quantity Provider Pharmacy   AMOX-CLAV 875MG TABLETS 12/18/2020 7 14 Units [facility]...         Atorvastatin Calcium   Dispensed Days Supply Quantity Provider Pharmacy   ATORVASTATIN 20MG TABLETS 06/13/2021 90 90 Units [facility] TIEN [facility] #...   ATORVASTATIN 20MG TABLETS 03/07/2021 90 90 Units [facility] TIEN [facility] #...   ATORVASTATIN 20MG TABLETS 12/06/2020 90 90 Units [facility] TI[facility] #...   ATORVASTATIN 20MG TABLETS 09/14/2020 90 90 Units [facility] TI[facility] #...         Clopidogrel Bisulfate   Dispensed Days Supply Quantity Provider Pharmacy   CLOPIDOGREL 75MG TABLETS 04/19/2021 90 90 Units [facility] TIEN [facility] #...   CLOPIDOGREL 75MG TABLETS 01/17/2021 90 90 Units [facility] TIEN [facility] #...   CLOPIDOGREL 75MG TABLETS 10/11/2020 90 90 Units [facility]IE TIEN [facility] #...   CLOPIDOGREL 75MG TABLETS 07/28/2020 90 90 Units [facility]IE TIEN [facility] #...         Ketorolac Tromethamine   Dispensed Days Supply Quantity Provider Pharmacy   KETOROLAC 0.5% OPHTH SOLN 10ML 05/20/2021 45 10 mL CATHERINEJOSEFA RIVERA [facility] #...   KETOROLAC 0.5% OPHTH SOLN 10ML 03/12/2021 45 10 mL CATHERINE,JOSEFA [facility] #...         Losartan Potassium   Dispensed Days Supply Quantity Provider Pharmacy   LOSARTAN 50MG TABLETS 05/06/2021 90 90 Units [facility]DONNIE [facility] #...   LOSARTAN 50MG TABLETS 01/24/2021 90 90 Units [facility]DONNIE [facility] #...   LOSARTAN 50MG TABLETS 10/26/2020 90 90 Units DONNIE [facility] #...   LOSARTAN 50MG TABLETS 07/29/2020 90 90 Units DONNIE [facility] #...         Montelukast  Sodium   Dispensed Days Supply Quantity Provider Pharmacy   MONTELUKAST 10MG TABLETS 04/19/2021 90 90 Units [email or web address] #...   MONTELUKAST 10MG TABLETS 01/17/2021 90 90 Units [email or web address] #...   MONTELUKAST 10MG TABLETS 10/03/2020 90 90 Units [email or web address] #...   MONTELUKAST 10MG TABLETS 07/20/2020 90 90 Units [facility] #...         Moxifloxacin HCl   Dispensed Days Supply Quantity Provider Pharmacy   MOXIFLOXACIN 0.5% OPHTH SOLN 3ML 05/23/2021 7 3 mL "[facility] #...   MOXIFLOXACIN 0.5% OPHTH SOLN 3ML 03/12/2021 10 3 mL "[facility] #...         hydroCHLOROthiazide   Dispensed Days Supply Quantity Provider Pharmacy   HYDROCHLOROTHIAZIDE 12.5MG TABLETS 05/06/2021 90 90 Units [email or web address] #...   HYDROCHLOROTHIAZIDE 12.5MG TABLETS 01/24/2021 90 90 Units [email or web address] #...   HYDROCHLOROTHIAZIDE 12.5MG TABLETS 10/26/2020 90 90 Units [email or web address] #...   HYDROCHLOROTHIAZIDE 12.5MG TABLETS 07/29/2020 90 90 Units [email or web address] #...         prednisoLONE Acetate   Dispensed Days Supply Quantity Provider Pharmacy   PREDNISOLONE AC 1% OPHTH SUSP 10ML 05/20/2021 45 10 mL "[facility] #...   PREDNISOLONE AC 1% OPHTH SUSP 10ML 03/12/2021 42 10 mL "[facility] #...

## 2021-07-06 ENCOUNTER — TELEPHONE (OUTPATIENT)
Dept: INTERNAL MEDICINE | Facility: OTHER | Age: 76
End: 2021-07-06

## 2021-07-06 LAB
BACTERIA SPEC CULT: ABNORMAL
SPECIMEN SOURCE: ABNORMAL
SPECIMEN SOURCE: ABNORMAL

## 2021-07-06 NOTE — TELEPHONE ENCOUNTER
Patient needs a hospital follow-up.  He was at [facility] last week.  Patient would like to be seen next week.    Please call patient back.   Thank you   Alethea Antonio on 7/6/2021 at 8:12 AM

## 2021-07-06 NOTE — TELEPHONE ENCOUNTER
Patient is looking for a follow up appointment next week. He is wondering if you can work him in or if he should see somebody else for his hospital follow up?     Jonah Seth LPN on 7/6/2021 at 1:34 PM

## 2021-07-13 ENCOUNTER — OFFICE VISIT (OUTPATIENT)
Dept: INTERNAL MEDICINE | Facility: OTHER | Age: 76
End: 2021-07-13
Attending: INTERNAL MEDICINE
Payer: COMMERCIAL

## 2021-07-13 VITALS
RESPIRATION RATE: 20 BRPM | TEMPERATURE: 97.9 F | SYSTOLIC BLOOD PRESSURE: 136 MMHG | OXYGEN SATURATION: 98 % | BODY MASS INDEX: 24.53 KG/M2 | WEIGHT: 156.6 LBS | HEART RATE: 72 BPM | DIASTOLIC BLOOD PRESSURE: 72 MMHG

## 2021-07-13 DIAGNOSIS — I10 ESSENTIAL HYPERTENSION: ICD-10-CM

## 2021-07-13 DIAGNOSIS — I70.212 ATHEROSCLEROSIS OF NATIVE ARTERY OF LEFT LOWER EXTREMITY WITH INTERMITTENT CLAUDICATION (H): ICD-10-CM

## 2021-07-13 DIAGNOSIS — A41.9 SEPSIS, DUE TO UNSPECIFIED ORGANISM, UNSPECIFIED WHETHER ACUTE ORGAN DYSFUNCTION PRESENT (H): ICD-10-CM

## 2021-07-13 DIAGNOSIS — N30.00 ACUTE CYSTITIS WITHOUT HEMATURIA: Primary | ICD-10-CM

## 2021-07-13 PROCEDURE — G0463 HOSPITAL OUTPT CLINIC VISIT: HCPCS

## 2021-07-13 PROCEDURE — 99214 OFFICE O/P EST MOD 30 MIN: CPT | Performed by: INTERNAL MEDICINE

## 2021-07-13 RX ORDER — LOSARTAN POTASSIUM 50 MG/1
50 TABLET ORAL DAILY
COMMUNITY
Start: 2021-07-13 | End: 2021-09-07

## 2021-07-13 RX ORDER — HYDROCHLOROTHIAZIDE 12.5 MG/1
12.5 CAPSULE ORAL DAILY
Qty: 60 CAPSULE | Refills: 1 | COMMUNITY
Start: 2021-07-13 | End: 2021-10-06

## 2021-07-13 RX ORDER — CLOPIDOGREL BISULFATE 75 MG/1
75 TABLET ORAL DAILY
Qty: 90 TABLET | Refills: 3 | COMMUNITY
Start: 2021-07-13 | End: 2021-11-08

## 2021-07-13 RX ORDER — ATORVASTATIN CALCIUM 20 MG/1
20 TABLET, FILM COATED ORAL DAILY
Qty: 90 TABLET | Refills: 3 | COMMUNITY
Start: 2021-07-13 | End: 2021-11-08

## 2021-07-13 ASSESSMENT — PAIN SCALES - GENERAL: PAINLEVEL: NO PAIN (0)

## 2021-07-13 ASSESSMENT — ENCOUNTER SYMPTOMS
CONSTITUTIONAL NEGATIVE: 1
ENDOCRINE NEGATIVE: 1
HEMATOLOGIC/LYMPHATIC NEGATIVE: 1
ALLERGIC/IMMUNOLOGIC NEGATIVE: 1

## 2021-07-13 NOTE — NURSING NOTE
"Chief Complaint   Patient presents with     Hospital F/U       Initial /72   Pulse 72   Temp 97.9  F (36.6  C) (Tympanic)   Resp 20   Wt 71 kg (156 lb 9.6 oz)   SpO2 98%   BMI 24.53 kg/m   Estimated body mass index is 24.53 kg/m  as calculated from the following:    Height as of 7/3/21: 1.702 m (5' 7\").    Weight as of this encounter: 71 kg (156 lb 9.6 oz).  FOOD SECURITY SCREENING QUESTIONS  Hunger Vital Signs:  Within the past 12 months we worried whether our food would run out before we got money to buy more. Never  Within the past 12 months the food we bought just didn't last and we didn't have money to get more. Never        Jonah Seth, VIDYA    "

## 2021-07-13 NOTE — PROGRESS NOTES
Chief Complaint:  Hospital f/u visit.    HPI: This patient is here for hospital follow-up visit.  He was hospitalized with urinary tract infection and sepsis.  Urine culture and blood cultures grew out an E. coli.  He was treated appropriately and discharged on Augmentin.  He is still finishing off the antibiotic.  No side effects from it.  He feels back to normal.    While in the hospital his blood pressure was high.  He tells me that this was because he was stressed and anxious.  He had amlodipine started and his hydrochlorothiazide and losartan were both doubled.  He quit the amlodipine a couple of days ago because it did not agree with him.  He thinks that it blood pressure would be fine with his previous medications.    Hospital records and results are reviewed in their entirety.  The patient has not had urinary issues for some time.  He does not have any current urinary symptoms of any concern.    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    On plavix per vascular surgeon     Enlarged prostate without lower urinary tract symptoms (luts)     No Comments Provided     Essential (primary) hypertension     No Comments Provided     GERD (gastroesophageal reflux disease)      Hyperlipidemia     No Comments Provided     DUNIA on CPAP        Past Surgical History:   Procedure Laterality Date     APPENDECTOMY OPEN      No Comments Provided     ARTHROSCOPY KNEE Left      AS REMOVAL OF KIDNEY STONE  2016     COLONOSCOPY  01/25/2017    Adenomatous, f/u in 2022     CYSTOSCOPY, TRANSURETHRAL RESECTION (TUR) PROSTATE, COMBINED      No Comments Provided     EMBOLECTOMY LOWER EXTREMITY  2017    Popliteal     HERNIORRHAPHY INGUINAL BILATERAL      No Comments Provided     OPEN REDUCTION INTERNAL  "FIXATION FEMUR DISTAL Left        Allergies   Allergen Reactions     Thiopental      Other reaction(s): *Unknown - Childhood Rxn  Patient states \"almost  from Pentothal as child\"     Cephalexin Hives     Aspirin      Other reaction(s): GI Upset     Ciprofloxacin        Current Outpatient Medications   Medication Sig Dispense Refill     amoxicillin-clavulanate (AUGMENTIN) 875-125 MG tablet Take 1 tablet by mouth every 12 hours 22 tablet 0     atorvastatin (LIPITOR) 20 MG tablet Take 1 tablet (20 mg) by mouth daily 90 tablet 3     clopidogrel (PLAVIX) 75 MG tablet Take 1 tablet (75 mg) by mouth daily 90 tablet 3     famotidine (PEPCID) 10 MG tablet Take 1 tablet (10 mg) by mouth 2 times daily       hydrochlorothiazide (MICROZIDE) 12.5 MG capsule Take 1 capsule (12.5 mg) by mouth daily 60 capsule 1     Lactobacillus (PROBIOTIC ACIDOPHILUS) TABS Take 1 tablet by mouth daily        losartan (COZAAR) 50 MG tablet Take 1 tablet (50 mg) by mouth daily       montelukast (SINGULAIR) 10 MG tablet Take 1 tablet (10 mg) by mouth At Bedtime 90 tablet 3     triamcinolone (NASACORT) 55 MCG/ACT nasal aerosol Spray 2 sprays into both nostrils daily         Social History     Socioeconomic History     Marital status:      Spouse name: Not on file     Number of children: Not on file     Years of education: Not on file     Highest education level: Not on file   Occupational History     Not on file   Tobacco Use     Smoking status: Former Smoker     Packs/day: 1.00     Years: 30.00     Pack years: 30.00     Types: Cigarettes     Quit date: 1983     Years since quittin.8     Smokeless tobacco: Former User     Quit date: 1986   Substance and Sexual Activity     Alcohol use: No     Alcohol/week: 0.0 standard drinks     Drug use: No     Comment: Drug use: No     Sexual activity: Yes   Other Topics Concern     Not on file   Social History Narrative     with two daughters from a previous marriage and five " grandchildren.  Retired as a .  Lives with his wife on [place] with wife Samantha in [place].     Social Determinants of Health     Financial Resource Strain:      Difficulty of Paying Living Expenses:    Food Insecurity:      Worried About Running Out of Food in the Last Year:      Ran Out of Food in the Last Year:    Transportation Needs:      Lack of Transportation (Medical):      Lack of Transportation (Non-Medical):    Physical Activity:      Days of Exercise per Week:      Minutes of Exercise per Session:    Stress:      Feeling of Stress :    Social Connections:      Frequency of Communication with Friends and Family:      Frequency of Social Gatherings with Friends and Family:      Attends Orthodoxy Services:      Active Member of Clubs or Organizations:      Attends Club or Organization Meetings:      Marital Status:    Intimate Partner Violence:      Fear of Current or Ex-Partner:      Emotionally Abused:      Physically Abused:      Sexually Abused:        Review of Systems   Constitutional: Negative.    Endocrine: Negative.    Skin: Negative.    Allergic/Immunologic: Negative.    Hematological: Negative.        Physical Exam  Vitals and nursing note reviewed.   Constitutional:       General: He is not in acute distress.     Appearance: Normal appearance. He is not ill-appearing, toxic-appearing or diaphoretic.   Cardiovascular:      Rate and Rhythm: Normal rate and regular rhythm.   Pulmonary:      Effort: Pulmonary effort is normal.      Breath sounds: Normal breath sounds.   Abdominal:      General: Abdomen is flat. There is no distension.      Palpations: Abdomen is soft.      Tenderness: There is no abdominal tenderness.   Skin:     General: Skin is warm and dry.   Neurological:      Mental Status: He is alert.         Assessment:      ICD-10-CM    1. Acute cystitis without hematuria  N30.00    2. Essential hypertension  I10 losartan (COZAAR) 50 MG tablet      hydrochlorothiazide (MICROZIDE) 12.5 MG capsule   3. Sepsis, due to unspecified organism, unspecified whether acute organ dysfunction present (H)  A41.9    4. Atherosclerosis of native artery of left lower extremity with intermittent claudication (H)  I70.212 atorvastatin (LIPITOR) 20 MG tablet     clopidogrel (PLAVIX) 75 MG tablet       Plan: He is seen for hospital follow-up visit for urinary tract infection and sepsis.  He appears to be back to baseline and will finish off his antibiotics.  Since this is his first episode, I am not going to do any further urologic evaluations.  Obviously if he has recurrent problems we will need to.  He will go back to his usual antihypertensive regimen which means stopping the amlodipine completely, decreasing hydrochlorothiazide down to 1 daily and decreasing the losartan down to 50 mg daily.  He will be in to see me for a physical sometime later this fall at which time we can potentially recheck a urine as well as recheck his blood pressure.

## 2021-07-16 NOTE — PROGRESS NOTES
Called patient for HF education, patient seemed to understand the etiology of his right sided heart failure and is actively changing his lifestyle to become healthier.   Education Narrative  Reviewed anatomy and physiology of heart failure with patient. Went over heart failure worksheet and patient's individual HF diagnosis, EF, risk factors, general medication classes and indications, as well as personal goals.  Goals: Patient's primary goal is to lose weight and feel healthier.     Discussed daily weights, sodium restriction, worsening signs and symptoms to report to physician, heart medications, and importance of adherence to medication regimen. Emphasized recommendation from AHA/AAHFN to keep daily sodium intake between 1500mg-2000mg.      Reviewed dietary handouts, advanced care planning classes, and advance directive planning handout. Discussed dietary considerations and reviewed Seven Day Heart Healthy Meal Plan by [employer].     Invited patient and family members/friends to HF support group and encouraged patient to call Heart Failure clinic during normal business hours with any questions.  Heart Failure program card with number given to patient.           Patient states full understanding of all information given.      HPI:    Jared Parry is a 75 year old male  who presents to [facility] today for fever, chills.    Fever, chills, sweats, fatigue, headaches, body aches, x 3 days.  Fever up to 101.    Mild nausea this afternoon.  No vomiting.  Appetite decreased some today. No loss of taste or smell.   Minimal cough started today.  Dry cough.  No pain with coughing.  No chest tightness or heaviness.  No shortness of breath at rest or activity.  Mild/very minimal left chest pain intermittent and chronic - feels like a sore muscle from doing push ups. No worsening chest pain with activity.   Mild sore throat today.  Minimal runny and stuffy nose.  No diarrhea.  No lower extremity swelling.  No neck pain or stiffness.   Wood tick bite last night, no other known tick bites this season.  No dysuria or hematuria.  Chronic nocturia.  No frequency or urgency during daytime.  No difficulty urinating or initiating urinary stream.    No known sick contacts or exposures.    Taking Tylenol and occasional Advil.        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    On plavix per vascular surgeon     Enlarged prostate without lower urinary tract symptoms (luts)     No Comments Provided     Essential (primary) hypertension     No Comments Provided     GERD (gastroesophageal reflux disease)      Hyperlipidemia     No Comments Provided     DUNIA on CPAP      Past Surgical History:   Procedure Laterality Date     APPENDECTOMY OPEN      No Comments Provided     ARTHROSCOPY KNEE Left      AS REMOVAL OF KIDNEY STONE  2016     COLONOSCOPY  01/25/2017    Adenomatous, f/u in 2022     CYSTOSCOPY, TRANSURETHRAL RESECTION (TUR) PROSTATE, COMBINED      No Comments Provided     EMBOLECTOMY LOWER EXTREMITY  2017    Popliteal  "    HERNIORRHAPHY INGUINAL BILATERAL      No Comments Provided     OPEN REDUCTION INTERNAL FIXATION FEMUR DISTAL Left      Social History     Tobacco Use     Smoking status: Former Smoker     Packs/day: 1.00     Years: 30.00     Pack years: 30.00     Types: Cigarettes     Quit date: 1983     Years since quittin.8     Smokeless tobacco: Former User     Quit date: 1986   Substance Use Topics     Alcohol use: No     Alcohol/week: 0.0 standard drinks     Current Outpatient Medications   Medication Sig Dispense Refill     atorvastatin (LIPITOR) 20 MG tablet Take 1 tablet (20 mg) by mouth daily 90 tablet 3     clopidogrel (PLAVIX) 75 MG tablet Take 1 tablet (75 mg) by mouth daily 90 tablet 3     cycloSPORINE (RESTASIS) 0.05 % ophthalmic emulsion Place 1 drop into both eyes 2 times daily       famotidine (PEPCID) 10 MG tablet Take 1 tablet (10 mg) by mouth 2 times daily       hydrochlorothiazide (HYDRODIURIL) 12.5 MG tablet Take 1 tablet (12.5 mg) by mouth daily 90 tablet 3     ketorolac (ACULAR) 0.5 % ophthalmic solution        Lactobacillus (PROBIOTIC ACIDOPHILUS) TABS        losartan (COZAAR) 50 MG tablet Take 1 tablet (50 mg) by mouth daily 90 tablet 3     montelukast (SINGULAIR) 10 MG tablet Take 1 tablet (10 mg) by mouth At Bedtime 90 tablet 3     moxifloxacin (VIGAMOX) 0.5 % ophthalmic solution INSTILL 1 DROP IN LEFT EYE FOUR TIMES DAILY AS DIRECTED. START 3 DAYS BEFORE SURGERY AND. CONTINUE FOR 1 WEEK AFTER SURGERY       prednisoLONE acetate (PRED FORTE) 1 % ophthalmic suspension        triamcinolone (NASACORT) 55 MCG/ACT nasal aerosol Spray 2 sprays into both nostrils daily       Allergies   Allergen Reactions     Aspirin      Other reaction(s): GI Upset     Cephalexin Hives     Ciprofloxacin      Thiopental      Other reaction(s): *Unknown - Childhood Rxn  Patient states \"almost  from Pentothal as child\"         Past medical history, past surgical history, current medications and allergies " "reviewed and accurate to the best of my knowledge.        ROS:  Refer to HPI    /64 (BP Location: Right arm, Patient Position: Sitting)   Pulse 77   Temp 101.1  F (38.4  C) (Tympanic)   Resp 16   Ht 1.715 m (5' 7.5\")   Wt 73.3 kg (161 lb 8 oz)   SpO2 95%   BMI 24.92 kg/m      EXAM:  General Appearance: Well appearing young elderly male, nonill appearance, appropriate appearance for age. No acute distress  Ears: Left TM intact, translucent with bony landmarks appreciated, no erythema, no effusion, no bulging, no purulence.  Right TM intact, translucent with bony landmarks appreciated, no erythema, no effusion, no bulging, no purulence.  Left auditory canal clear.  Right auditory canal clear.  Normal external ears, non tender.  Eyes: conjunctivae normal without erythema or irritation, corneas clear, no drainage or crusting, no eyelid swelling, pupils equal   Orophayrnx: moist mucous membranes, posterior pharynx without erythema, tonsils without hypertrophy, no erythema, no exudates or petechiae, no post nasal drip seen, no trismus, voice clear.    Sinuses:  No sinus tenderness upon palpation of the frontal or maxillary sinuses  Nose:  Bilateral nares: no erythema, no edema, no drainage or congestion   Neck: supple without adenopathy  Respiratory: normal chest wall and respirations.  Normal effort.  Clear to auscultation bilaterally, no wheezing, crackles or rhonchi.  No increased work of breathing.  No cough appreciated.   Cardiac: RRR with no murmurs.  No lower extremity edema.  Abdomen: soft, nontender, no rigidity, no rebound tenderness or guarding, normal bowel sounds present  :  No suprapubic tenderness to palpation.  Mild bilateral flank/CVA tenderness to palpation.    Musculoskeletal:  Equal movement of bilateral upper extremities.  Equal movement of bilateral lower extremities.  Normal gait.    Psychological: normal affect, alert, oriented, and pleasant.       Labs:  Results for orders placed or " performed during the hospital encounter of 07/02/21   XR Chest Port 1 View     Status: None    Narrative    Procedure:XR CHEST PORT 1 VIEW    Clinical history:Male, 75 years, fevers, tachypnea    Technique: Single view was obtained.    Comparison: None    Findings: The cardiac silhouette is normal. The pulmonary vasculature  is normal.    The lungs are clear. Bony structures are unremarkable.      Impression    Impression:   No acute abnormality. No evidence of acute or active cardiopulmonary  disease.    CLEMENT AG MD          SYSTEM ID:  [id number]   Lactic acid whole blood STAT     Status: None   Result Value Ref Range    Lactic Acid 1.9 0.7 - 2.0 mmol/L   Results for orders placed or performed in visit on 07/02/21   CBC and Differential     Status: Abnormal   Result Value Ref Range    WBC 16.5 (H) 4.0 - 11.0 10e9/L    RBC Count 4.62 4.4 - 5.9 10e12/L    Hemoglobin 14.2 13.3 - 17.7 g/dL    Hematocrit 40.5 40.0 - 53.0 %    MCV 88 78 - 100 fl    MCH 30.7 26.5 - 33.0 pg    MCHC 35.1 31.5 - 36.5 g/dL    RDW 12.7 10.0 - 15.0 %    Platelet Count 191 150 - 450 10e9/L    Diff Method Automated Method     % Neutrophils 86.9 %    % Lymphocytes 5.8 %    % Monocytes 6.4 %    % Eosinophils 0.2 %    % Basophils 0.3 %    % Immature Granulocytes 0.4 %    Absolute Neutrophil 14.3 (H) 1.6 - 8.3 10e9/L    Absolute Lymphocytes 1.0 0.8 - 5.3 10e9/L    Absolute Monocytes 1.1 0.0 - 1.3 10e9/L    Absolute Eosinophils 0.0 0.0 - 0.7 10e9/L    Absolute Basophils 0.1 0.0 - 0.2 10e9/L    Abs Immature Granulocytes 0.1 0 - 0.4 10e9/L   Basic Metabolic Panel     Status: Abnormal   Result Value Ref Range    Sodium 131 (L) 134 - 144 mmol/L    Potassium 3.8 3.5 - 5.1 mmol/L    Chloride 96 (L) 98 - 107 mmol/L    Carbon Dioxide 27 21 - 31 mmol/L    Anion Gap 8 3 - 14 mmol/L    Glucose 114 (H) 70 - 105 mg/dL    Urea Nitrogen 21 7 - 25 mg/dL    Creatinine 1.04 0.70 - 1.30 mg/dL    GFR Estimate 70 >60 mL/min/[1.73_m2]    GFR Estimate If Black 84  >60 mL/min/[1.73_m2]    Calcium 8.9 8.6 - 10.3 mg/dL   UA reflex to Microscopic     Status: Abnormal   Result Value Ref Range    Color Urine Yellow     Appearance Urine Clear     Glucose Urine Negative NEG^Negative mg/dL    Bilirubin Urine Negative NEG^Negative    Ketones Urine Negative NEG^Negative mg/dL    Specific Gravity Urine 1.026 1.003 - 1.035    Blood Urine Small (A) NEG^Negative    pH Urine 6.0 5.0 - 7.0 pH    Protein Albumin Urine 10 (A) NEG^Negative mg/dL    Urobilinogen mg/dL 2.0 0.0 - 2.0 mg/dL    Nitrite Urine Negative NEG^Negative    Leukocyte Esterase Urine Large (A) NEG^Negative    Source Midstream Urine     RBC Urine 9 (H) 0 - 2 /HPF    WBC Urine 153 (H) 0 - 5 /HPF    Bacteria Urine Few (A) NEG^Negative /HPF    Mucous Urine Present (A) NEG^Negative /LPF    Hyaline Casts 1 0 - 2 /LPF   SARS-CoV-2 COVID-19 Virus (Coronavirus) by PCR     Status: None    Specimen: Nasopharyngeal   Result Value Ref Range    SARS-CoV-2 Virus Specimen Source Nasopharyngeal     SARS-CoV-2 PCR Result NEGATIVE     SARS-CoV-2 PCR Comment       Testing was performed using the Xpert Xpress SARS-CoV-2 Assay on the Cepheid Gene-Xpert   Instrument Systems. Additional information about this Emergency Use Authorization (EUA)   assay can be found via the Lab Guide.               ASSESSMENT/PLAN:    I have reviewed the nursing notes.  I have reviewed the findings, diagnosis, plan and need for follow up with the patient.    1. Fever and chills    - CBC and Differential; Future  - CBC and Differential  - Basic Metabolic Panel; Future  - Basic Metabolic Panel  - UA reflex to Microscopic; Future  - UA reflex to Microscopic  - Urine Culture Aerobic Bacterial  - SARS-CoV-2 COVID-19 Virus (Coronavirus) by PCR    CBC - elevated WBC of 16.5, elevated absolute neutrophils of 14.3   BMP - NA of 131, K 3.8, creatinine of 1.04, GFR 70  Urinalysis - clear yellow, small blood, negative nitrite, large leukocyte estrace  Urine microscopic - RBC 9, WBC  153, bacteria few      345 pm:  Patient developed severe shaking rigors, tachypnea, and reported difficulty breathing.   Patient having difficulty talking in full sentences to to shaking rigors.   Temperature decreased from 101.1 to 100.4 without pharmacological intervention.  Oxygen saturation decreased from 95% RA to 84% RA.  Oxygen applied at 2 liters nasal canula with saturation of 90%.  CXR cancelled and patient immediately placed in wheelchair and transferred to ER.  Verbal report provided to ER MD.    Addendum:  Review of ER note, patient admitted for urosepsis.    2. Cough    - Symptomatic COVID-19 Virus (Coronavirus) by PCR; Future    Negative rapid COVID-19 PCR test            I explained my diagnostic considerations and recommendations to the patient, who voiced understanding and agreement with the treatment plan. All questions were answered. We discussed potential side effects of any prescribed or recommended therapies, as well as expectations for response to treatments.

## 2021-08-18 ENCOUNTER — TELEPHONE (OUTPATIENT)
Dept: FAMILY MEDICINE | Facility: OTHER | Age: 76
End: 2021-08-18

## 2021-08-18 NOTE — TELEPHONE ENCOUNTER
Jared needs lab orders for HD physical appointment 09/03/2021.    Oralia Jordan on 8/18/2021 at 3:13 PM

## 2021-08-24 ENCOUNTER — TELEPHONE (OUTPATIENT)
Dept: LAB | Facility: OTHER | Age: 76
End: 2021-08-24

## 2021-09-02 ENCOUNTER — APPOINTMENT (OUTPATIENT)
Dept: FAMILY MEDICINE | Facility: OTHER | Age: 76
End: 2021-09-02
Attending: CHIROPRACTOR

## 2021-09-02 ENCOUNTER — APPOINTMENT (OUTPATIENT)
Dept: LAB | Facility: OTHER | Age: 76
End: 2021-09-02
Attending: CHIROPRACTOR

## 2021-09-02 PROCEDURE — 99499 UNLISTED E&M SERVICE: CPT | Performed by: CHIROPRACTOR

## 2021-09-02 PROCEDURE — 93000 ELECTROCARDIOGRAM COMPLETE: CPT | Performed by: INTERNAL MEDICINE

## 2021-09-07 ENCOUNTER — OFFICE VISIT (OUTPATIENT)
Dept: INTERNAL MEDICINE | Facility: OTHER | Age: 76
End: 2021-09-07
Attending: INTERNAL MEDICINE
Payer: COMMERCIAL

## 2021-09-07 VITALS
BODY MASS INDEX: 23.84 KG/M2 | WEIGHT: 152.2 LBS | RESPIRATION RATE: 18 BRPM | OXYGEN SATURATION: 99 % | DIASTOLIC BLOOD PRESSURE: 88 MMHG | TEMPERATURE: 97.5 F | HEART RATE: 76 BPM | SYSTOLIC BLOOD PRESSURE: 138 MMHG

## 2021-09-07 DIAGNOSIS — F41.1 GAD (GENERALIZED ANXIETY DISORDER): ICD-10-CM

## 2021-09-07 DIAGNOSIS — I10 ESSENTIAL HYPERTENSION: Primary | ICD-10-CM

## 2021-09-07 DIAGNOSIS — G47.09 OTHER INSOMNIA: ICD-10-CM

## 2021-09-07 PROBLEM — A41.9 SEPSIS (H): Status: RESOLVED | Noted: 2021-07-02 | Resolved: 2021-09-07

## 2021-09-07 PROBLEM — N30.00 ACUTE CYSTITIS WITHOUT HEMATURIA: Status: RESOLVED | Noted: 2021-07-02 | Resolved: 2021-09-07

## 2021-09-07 PROCEDURE — 99214 OFFICE O/P EST MOD 30 MIN: CPT | Performed by: INTERNAL MEDICINE

## 2021-09-07 PROCEDURE — G0463 HOSPITAL OUTPT CLINIC VISIT: HCPCS

## 2021-09-07 RX ORDER — ZOLPIDEM TARTRATE 5 MG/1
5 TABLET ORAL
Qty: 15 TABLET | Refills: 0 | Status: SHIPPED | OUTPATIENT
Start: 2021-09-07 | End: 2023-06-27

## 2021-09-07 RX ORDER — BUSPIRONE HYDROCHLORIDE 10 MG/1
10 TABLET ORAL 2 TIMES DAILY
COMMUNITY
Start: 2021-09-07 | End: 2021-10-06

## 2021-09-07 RX ORDER — LOSARTAN POTASSIUM 50 MG/1
100 TABLET ORAL DAILY
COMMUNITY
Start: 2021-09-07 | End: 2021-10-06

## 2021-09-07 RX ORDER — ZOLPIDEM TARTRATE 5 MG/1
5 TABLET ORAL
COMMUNITY
Start: 2021-09-07 | End: 2021-09-07

## 2021-09-07 ASSESSMENT — ENCOUNTER SYMPTOMS
ALLERGIC/IMMUNOLOGIC NEGATIVE: 1
HEMATOLOGIC/LYMPHATIC NEGATIVE: 1
ENDOCRINE NEGATIVE: 1
CONSTITUTIONAL NEGATIVE: 1

## 2021-09-07 ASSESSMENT — PAIN SCALES - GENERAL: PAINLEVEL: NO PAIN (0)

## 2021-09-07 NOTE — NURSING NOTE
"Chief Complaint   Patient presents with     Hypertension       Initial /88   Pulse 76   Temp 97.5  F (36.4  C) (Tympanic)   Resp 18   Wt 69 kg (152 lb 3.2 oz)   SpO2 99%   BMI 23.84 kg/m   Estimated body mass index is 23.84 kg/m  as calculated from the following:    Height as of 7/3/21: 1.702 m (5' 7\").    Weight as of this encounter: 69 kg (152 lb 3.2 oz).  FOOD SECURITY SCREENING QUESTIONS  Hunger Vital Signs:  Within the past 12 months we worried whether our food would run out before we got money to buy more. Never  Within the past 12 months the food we bought just didn't last and we didn't have money to get more. Never        Jonah Seth, VIDYA    "

## 2021-09-07 NOTE — PROGRESS NOTES
Chief Complaint: Numerous concerns.    HPI: This patient comes in today for a number of things.  First of all, his blood pressure has been going somewhat high.  When it is high he gets a strange almost tingly type sensation in his head.  When he senses that, he knows his blood pressure is high and then he starts getting more anxious and it is somewhat of a vicious cycle.  When he was hospitalized this summer he was discharged on double dose of losartan and hydrochlorothiazide as well as amlodipine.  He quit taking the amlodipine and he is back on his regular dose of 12-1/2 mg of hydrochlorothiazide and 50 mg of losartan and has been on that since.  He wonders if he needs a different blood pressure medication because these do not seem to be working.    He has trouble with anxiety.  He started back on the BuSpar 10 mg twice daily approximately 2 weeks ago.  He is feeling better since restarting that medication.    He also has insomnia.  He uses Ambien very infrequently.  This was given to him by his provider in [place] and over the last year and a half he has only taken 12 tablets.  It works very well and he would like a refill on that.    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    On plavix per vascular surgeon     Enlarged prostate without lower urinary tract symptoms (luts)     No Comments Provided     Essential (primary) hypertension     No Comments Provided     GERD (gastroesophageal reflux disease)      Hyperlipidemia     No Comments Provided     DUNIA on CPAP        Past Surgical History:   Procedure Laterality Date     APPENDECTOMY OPEN      No Comments Provided     ARTHROSCOPY KNEE Left      AS REMOVAL OF KIDNEY STONE  2016     COLONOSCOPY  01/25/2017    Adenomatous, f/u in  If she desires we can put her back on Aygestin 5 mg twice a day until her IUD placement. This may make her bleeding lighter but I will not promise her it would stop. Said also do a home pregnancy test if she has had intercourse since her postpartum visit.   "     CYSTOSCOPY, TRANSURETHRAL RESECTION (TUR) PROSTATE, COMBINED      No Comments Provided     EMBOLECTOMY LOWER EXTREMITY  2017    Popliteal     HERNIORRHAPHY INGUINAL BILATERAL      No Comments Provided     OPEN REDUCTION INTERNAL FIXATION FEMUR DISTAL Left        Allergies   Allergen Reactions     Thiopental      Other reaction(s): *Unknown - Childhood Rxn  Patient states \"almost  from Pentothal as child\"     Cephalexin Hives     Aspirin      Other reaction(s): GI Upset     Ciprofloxacin        Current Outpatient Medications   Medication Sig Dispense Refill     atorvastatin (LIPITOR) 20 MG tablet Take 1 tablet (20 mg) by mouth daily 90 tablet 3     busPIRone (BUSPAR) 10 MG tablet Take 1 tablet (10 mg) by mouth 2 times daily       clopidogrel (PLAVIX) 75 MG tablet Take 1 tablet (75 mg) by mouth daily 90 tablet 3     famotidine (PEPCID) 10 MG tablet Take 1 tablet (10 mg) by mouth 2 times daily       hydrochlorothiazide (MICROZIDE) 12.5 MG capsule Take 1 capsule (12.5 mg) by mouth daily 60 capsule 1     Lactobacillus (PROBIOTIC ACIDOPHILUS) TABS Take 1 tablet by mouth daily        losartan (COZAAR) 50 MG tablet Take 1 tablet (50 mg) by mouth daily       montelukast (SINGULAIR) 10 MG tablet Take 1 tablet (10 mg) by mouth At Bedtime 90 tablet 3     triamcinolone (NASACORT) 55 MCG/ACT nasal aerosol Spray 2 sprays into both nostrils daily       zolpidem (AMBIEN) 5 MG tablet Take 1 tablet (5 mg) by mouth nightly as needed for sleep         Social History     Socioeconomic History     Marital status:      Spouse name: Not on file     Number of children: Not on file     Years of education: Not on file     Highest education level: Not on file   Occupational History     Not on file   Tobacco Use     Smoking status: Former Smoker     Packs/day: 1.00     Years: 30.00     Pack years: 30.00     Types: Cigarettes     Quit date: 1983     Years since quittin.0     Smokeless tobacco: Former User     Quit " date: 9/11/1986   Substance and Sexual Activity     Alcohol use: No     Alcohol/week: 0.0 standard drinks     Drug use: No     Comment: Drug use: No     Sexual activity: Yes   Other Topics Concern     Not on file   Social History Narrative     with two daughters from a previous marriage and five grandchildren.  Retired as a .  Lives with his wife on [address] with wife Samantha in [place].     Social Determinants of Health     Financial Resource Strain:      Difficulty of Paying Living Expenses:    Food Insecurity:      Worried About Running Out of Food in the Last Year:      Ran Out of Food in the Last Year:    Transportation Needs:      Lack of Transportation (Medical):      Lack of Transportation (Non-Medical):    Physical Activity:      Days of Exercise per Week:      Minutes of Exercise per Session:    Stress:      Feeling of Stress :    Social Connections:      Frequency of Communication with Friends and Family:      Frequency of Social Gatherings with Friends and Family:      Attends Sikh Services:      Active Member of Clubs or Organizations:      Attends Club or Organization Meetings:      Marital Status:    Intimate Partner Violence:      Fear of Current or Ex-Partner:      Emotionally Abused:      Physically Abused:      Sexually Abused:        Review of Systems   Constitutional: Negative.    Endocrine: Negative.    Skin: Negative.    Allergic/Immunologic: Negative.    Hematological: Negative.        Physical Exam  Vitals and nursing note reviewed.   Constitutional:       General: He is not in acute distress.     Appearance: Normal appearance. He is not ill-appearing, toxic-appearing or diaphoretic.   Neurological:      Mental Status: He is alert.   Psychiatric:      Comments: Slightly anxious         Assessment:      ICD-10-CM    1. Essential hypertension  I10    2. JALYN (generalized anxiety disorder)  F41.1    3. Other insomnia  G47.09        Plan: After discussion, I  recommended just increasing the losartan to 100 mg daily.  He will keep the hydrochlorothiazide at 12-1/2 mg daily.  I do not want to try different medication at this point in time as I think these are effective if we get him on the proper dose.  A lot of reassurance was provided.  It is obvious that his anxiety can certainly exacerbate his blood pressure.  He will continue with the BuSpar.  For his insomnia, I did agree to refill the Ambien as he gets that very infrequently.  He will be in to see me in a month or so for a Medicare wellness physical at which time we can reassess where things are at.

## 2021-09-30 ENCOUNTER — OFFICE VISIT (OUTPATIENT)
Dept: FAMILY MEDICINE | Facility: OTHER | Age: 76
End: 2021-09-30
Attending: FAMILY MEDICINE
Payer: COMMERCIAL

## 2021-09-30 VITALS
BODY MASS INDEX: 23.45 KG/M2 | SYSTOLIC BLOOD PRESSURE: 122 MMHG | WEIGHT: 149.4 LBS | HEART RATE: 71 BPM | HEIGHT: 67 IN | DIASTOLIC BLOOD PRESSURE: 68 MMHG | TEMPERATURE: 97.6 F | OXYGEN SATURATION: 98 % | RESPIRATION RATE: 20 BRPM

## 2021-09-30 DIAGNOSIS — Z98.890 STATUS POST INGUINAL HERNIA REPAIR: ICD-10-CM

## 2021-09-30 DIAGNOSIS — Z87.19 STATUS POST INGUINAL HERNIA REPAIR: ICD-10-CM

## 2021-09-30 PROCEDURE — G0463 HOSPITAL OUTPT CLINIC VISIT: HCPCS

## 2021-09-30 PROCEDURE — 99213 OFFICE O/P EST LOW 20 MIN: CPT | Performed by: FAMILY MEDICINE

## 2021-09-30 ASSESSMENT — MIFFLIN-ST. JEOR: SCORE: 1376.3

## 2021-09-30 ASSESSMENT — PAIN SCALES - GENERAL: PAINLEVEL: NO PAIN (0)

## 2021-09-30 NOTE — NURSING NOTE
Patient here for right inguinal pain that started 3 months prior. Medication Reconciliation: complete.    Ayleen Naranjo LPN  9/30/2021 2:29 PM

## 2021-10-01 PROBLEM — Z87.19 STATUS POST INGUINAL HERNIA REPAIR: Status: ACTIVE | Noted: 2021-10-01

## 2021-10-01 PROBLEM — Z98.890 STATUS POST INGUINAL HERNIA REPAIR: Status: ACTIVE | Noted: 2021-10-01

## 2021-10-01 NOTE — PROGRESS NOTES
"  SUBJECTIVE:   Jared Parry is a 75 year old male who presents to clinic today for the following health issues: Concerned about a recurrent inguinal hernia    Patient arrives here concerned about a recurrent right inguinal hernia.  This is been going on a month.  Typically occurs when he stretches.  He did have surgery to his right inguinal hernia with mesh placement.  Years ago.        Patient Active Problem List    Diagnosis Date Noted     Other insomnia 09/07/2021     Priority: Medium     Asthma 07/03/2021     Priority: Medium     Formatting of this note might be different from the original.  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) 06/15/2018     Priority: Medium     Attention deficit disorder 01/25/2018     Priority: Medium     History of colonic polyps 01/25/2018     Priority: Medium     Esophageal reflux 01/25/2018     Priority: Medium     Hyperlipidemia, mild 01/25/2018     Priority: Medium     Hypertension 01/25/2018     Priority: Medium     Nephrolithiasis 01/25/2018     Priority: Medium     DUNIA on CPAP 01/25/2018     Priority: Medium     Popliteal artery embolism, 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  Physical Exam  Constitutional:       Appearance: Normal appearance.   Genitourinary:     Comments: Both testes descended.  I do not feel an obvious inguinal hernia.  Neurological:      Mental Status: He is alert.         Diagnostic Test Results:  none     ASSESSMENT/PLAN:         (Z98.890,  Z87.19) Status post " inguinal hernia repair  Advised patient I do not feel any obvious hernia.  Possibly there is some tissue that stretching.  Would give it a number of weeks and if symptoms persist he would like an opinion from general surgery would recommend an appointment      Robert Grayson MD  [facility]

## 2021-10-06 ENCOUNTER — OFFICE VISIT (OUTPATIENT)
Dept: INTERNAL MEDICINE | Facility: OTHER | Age: 76
End: 2021-10-06
Attending: INTERNAL MEDICINE
Payer: COMMERCIAL

## 2021-10-06 VITALS
SYSTOLIC BLOOD PRESSURE: 134 MMHG | WEIGHT: 152.6 LBS | RESPIRATION RATE: 16 BRPM | HEART RATE: 67 BPM | DIASTOLIC BLOOD PRESSURE: 86 MMHG | OXYGEN SATURATION: 96 % | TEMPERATURE: 97.5 F | BODY MASS INDEX: 23.9 KG/M2

## 2021-10-06 DIAGNOSIS — I74.3: ICD-10-CM

## 2021-10-06 DIAGNOSIS — F41.1 GAD (GENERALIZED ANXIETY DISORDER): ICD-10-CM

## 2021-10-06 DIAGNOSIS — G47.09 OTHER INSOMNIA: ICD-10-CM

## 2021-10-06 DIAGNOSIS — I10 ESSENTIAL HYPERTENSION: Primary | ICD-10-CM

## 2021-10-06 DIAGNOSIS — E78.5 HYPERLIPIDEMIA, MILD: ICD-10-CM

## 2021-10-06 DIAGNOSIS — G47.33 OSA ON CPAP: ICD-10-CM

## 2021-10-06 DIAGNOSIS — K21.9 GASTROESOPHAGEAL REFLUX DISEASE WITHOUT ESOPHAGITIS: ICD-10-CM

## 2021-10-06 DIAGNOSIS — J45.20 MILD INTERMITTENT ASTHMA, UNCOMPLICATED: ICD-10-CM

## 2021-10-06 PROBLEM — Z87.19 STATUS POST INGUINAL HERNIA REPAIR: Status: RESOLVED | Noted: 2021-10-01 | Resolved: 2021-10-06

## 2021-10-06 PROBLEM — Z98.890 STATUS POST INGUINAL HERNIA REPAIR: Status: RESOLVED | Noted: 2021-10-01 | Resolved: 2021-10-06

## 2021-10-06 PROCEDURE — G0463 HOSPITAL OUTPT CLINIC VISIT: HCPCS

## 2021-10-06 PROCEDURE — G0439 PPPS, SUBSEQ VISIT: HCPCS | Performed by: INTERNAL MEDICINE

## 2021-10-06 PROCEDURE — 99213 OFFICE O/P EST LOW 20 MIN: CPT | Mod: 25 | Performed by: INTERNAL MEDICINE

## 2021-10-06 RX ORDER — HYDROCHLOROTHIAZIDE 12.5 MG/1
12.5 CAPSULE ORAL DAILY
Qty: 90 CAPSULE | Refills: 3 | Status: CANCELLED | OUTPATIENT
Start: 2021-10-06

## 2021-10-06 RX ORDER — MONTELUKAST SODIUM 10 MG/1
10 TABLET ORAL AT BEDTIME
Qty: 90 TABLET | Refills: 3 | Status: SHIPPED | OUTPATIENT
Start: 2021-10-06 | End: 2022-10-05

## 2021-10-06 RX ORDER — LOSARTAN POTASSIUM AND HYDROCHLOROTHIAZIDE 12.5; 1 MG/1; MG/1
1 TABLET ORAL DAILY
Qty: 90 TABLET | Refills: 3 | Status: SHIPPED | OUTPATIENT
Start: 2021-10-06 | End: 2022-10-05

## 2021-10-06 ASSESSMENT — ANXIETY QUESTIONNAIRES
5. BEING SO RESTLESS THAT IT IS HARD TO SIT STILL: NOT AT ALL
3. WORRYING TOO MUCH ABOUT DIFFERENT THINGS: NOT AT ALL
1. FEELING NERVOUS, ANXIOUS, OR ON EDGE: NOT AT ALL
8. IF YOU CHECKED OFF ANY PROBLEMS, HOW DIFFICULT HAVE THESE MADE IT FOR YOU TO DO YOUR WORK, TAKE CARE OF THINGS AT HOME, OR GET ALONG WITH OTHER PEOPLE?: NOT DIFFICULT AT ALL
6. BECOMING EASILY ANNOYED OR IRRITABLE: NOT AT ALL
4. TROUBLE RELAXING: NOT AT ALL
GAD7 TOTAL SCORE: 0
7. FEELING AFRAID AS IF SOMETHING AWFUL MIGHT HAPPEN: NOT AT ALL
GAD7 TOTAL SCORE: 0
7. FEELING AFRAID AS IF SOMETHING AWFUL MIGHT HAPPEN: NOT AT ALL
2. NOT BEING ABLE TO STOP OR CONTROL WORRYING: NOT AT ALL
GAD7 TOTAL SCORE: 0

## 2021-10-06 ASSESSMENT — PAIN SCALES - GENERAL: PAINLEVEL: NO PAIN (0)

## 2021-10-06 NOTE — PROGRESS NOTES
SUBJECTIVE:   Jared Parry is a 75 year old male who presents for Preventive Visit.      Patient has been advised of split billing requirements and indicates understanding: Yes   Are you in the first 12 months of your Medicare coverage?  No    HPI     This patient is here today for a Medicare wellness physical.  He has a history of hypertension that has been a little bit problematic lately.  We now have him on losartan 50 mg 2 daily and hydrochlorothiazide 12-1/2 mg 1 daily and his blood pressure is doing excellent.  He has no side effects from the therapy.  He had been on amlodipine earlier but he did not tolerate that very well.  He recently saw cardiology who recommended that he stay on his current antihypertensive regimen.    The patient also has hyperlipidemia.  He is on moderate intensity statin therapy which he tolerates.  He has a remote history of a popliteal artery aneurysm.  He was on systemic anticoagulation at first but then was directed to take Plavix daily which he continues on to this day.  The cardiologist recommended that he stay with his treatment but also have an echocardiogram and a Holter monitor.  The echocardiogram has been done and was normal.    The patient has reflux disease which is very well controlled with Pepcid.  The probiotic is probably also helping his stomach.  He has some mild asthma or allergies and takes Singulair on a regular basis with good control.  He has some insomnia and uses Ambien regularly.  He has a history of anxiety and had been on BuSpar on a fairly regular basis but is no longer taking that.    Medications are reconciled.  Past medical history, past surgical history, family history and social histories are reviewed and updated.  Immunizations are up-to-date other than a shingles vaccine.  Colonoscopy will be due next year.    Do you feel safe in your environment? Yes    Have you ever done Advance Care Planning? (For example, a Health Directive, POLST, or a  discussion with a medical provider or your loved ones about your wishes): Yes, advance care planning is on file.       Fall risk  Fallen 2 or more times in the past year?: No  Any fall with injury in the past year?: No    Cognitive Screening   1) Repeat 3 items (Leader, Season, Table)    2) Clock draw: NORMAL  3) 3 item recall: Recalls 3 objects  Results: 3 items recalled: COGNITIVE IMPAIRMENT LESS LIKELY    Mini-CogTM Copyright ARVIND Gooden. Licensed by the author for use in [facility]; reprinted with permission (jg@[facility]). All rights reserved.          Reviewed and updated as needed this visit by clinical staff  Tobacco  Allergies  Meds  Problems  Med Hx  Surg Hx  Fam Hx          Reviewed and updated as needed this visit by Provider  Tobacco  Allergies  Meds  Problems  Med Hx  Surg Hx  Fam Hx         Social History     Tobacco Use     Smoking status: Former Smoker     Packs/day: 1.00     Years: 30.00     Pack years: 30.00     Types: Cigarettes     Quit date: 1983     Years since quittin.0     Smokeless tobacco: Former User     Quit date: 1986   Substance Use Topics     Alcohol use: No     Alcohol/week: 0.0 standard drinks         No flowsheet data found.        Current Outpatient Medications   Medication     atorvastatin (LIPITOR) 20 MG tablet     clopidogrel (PLAVIX) 75 MG tablet     famotidine (PEPCID) 10 MG tablet     Lactobacillus (PROBIOTIC ACIDOPHILUS) TABS     losartan-hydrochlorothiazide (HYZAAR) 100-12.5 MG tablet     montelukast (SINGULAIR) 10 MG tablet     triamcinolone (NASACORT) 55 MCG/ACT nasal aerosol     zolpidem (AMBIEN) 5 MG tablet     No current facility-administered medications for this visit.         Current providers sharing in care for this patient include:   Patient Care Team:  Stevie Agrawal MD as PCP - General (Internal Medicine)  Stevie Agrawal MD as Assigned PCP  Jeff Walton MD as Assigned Musculoskeletal Provider    The following health  "maintenance items are reviewed in Epic and correct as of today:  Health Maintenance Due   Topic Date Due     ZOSTER IMMUNIZATION (2 of 3) 12/19/2011     Lab work is in process          Review of Systems  Constitutional, HEENT, cardiovascular, pulmonary, GI, , musculoskeletal, neuro, skin, endocrine and psych systems are negative, except as otherwise noted.    OBJECTIVE:   /86 (BP Location: Right arm, Patient Position: Sitting, Cuff Size: Adult Regular)   Pulse 67   Temp 97.5  F (36.4  C) (Tympanic)   Resp 16   Wt 69.2 kg (152 lb 9.6 oz)   SpO2 96%   BMI 23.90 kg/m   Estimated body mass index is 23.9 kg/m  as calculated from the following:    Height as of 9/30/21: 1.702 m (5' 7\").    Weight as of this encounter: 69.2 kg (152 lb 9.6 oz).  Physical Exam  GENERAL: healthy, alert and no distress  EYES: Eyes grossly normal to inspection, PERRL and conjunctivae and sclerae normal  HENT: ear canals and TM's normal, nose and mouth without ulcers or lesions  NECK: no adenopathy, no asymmetry, masses, or scars and thyroid normal to palpation  RESP: lungs clear to auscultation - no rales, rhonchi or wheezes  CV: regular rate and rhythm, normal S1 S2, no S3 or S4, no murmur, click or rub, no peripheral edema and peripheral pulses strong  ABDOMEN: soft, nontender, no hepatosplenomegaly, no masses and bowel sounds normal  : Normal male, no hernia.  Prostate normal.  The right inguinal area where he complains of some occasional sharp pain was closely examined and was normal.  MS: no gross musculoskeletal defects noted, no edema  SKIN: no suspicious lesions or rashes  NEURO: Normal strength and tone, mentation intact and speech normal  PSYCH: mentation appears normal, affect normal/bright        ASSESSMENT / PLAN:       ICD-10-CM    1. Essential hypertension  I10 losartan-hydrochlorothiazide (HYZAAR) 100-12.5 MG tablet   2. Mild intermittent asthma, uncomplicated  J45.20 montelukast (SINGULAIR) 10 MG tablet   3. " "Other insomnia  G47.09    4. 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 DUNIA on CPAP  G47.33     Z99.89    6. Hyperlipidemia, mild  E78.5    7. JALYN (generalized anxiety disorder)  F41.1    8. Gastroesophageal reflux disease without esophagitis  K21.9        Patient has been advised of split billing requirements and indicates understanding: Yes  COUNSELING:    At this point in time he appears to be doing very well.  I did talk to him about his blood pressure and it is well controlled but we can simplify his regimen by changing his to losartan and hydrochlorothiazide into Hyzaar 1 daily.  He is interested in this so new prescription is sent to his pharmacy.  In regards to his groin pain, there does not appear to be any hernia or other abnormality present.  I would imagine he probably just has some scarring or similar in this area which causes some occasional discomfort.  No further testing is warranted unless this pain persists.  His anxiety is currently doing well without the BuSpar and with that his head feels clear so we will not treat him at all with any anxiolytics at this time.  He will continue with anticoagulation with Plavix as recommended by cardiology, no further evaluation is necessary for his thrombotic episode unless he has recurrences.  Everything else appears to be up-to-date.  Assuming all goes well, he will follow-up with me on an annual basis.    Reviewed preventive health counseling, as reflected in patient instructions       Regular exercise       Healthy diet/nutrition    Estimated body mass index is 23.9 kg/m  as calculated from the following:    Height as of 9/30/21: 1.702 m (5' 7\").    Weight as of this encounter: 69.2 kg (152 lb 9.6 oz).        He reports that he quit smoking about 38 years ago. His smoking use included cigarettes. He has a 30.00 pack-year smoking history. He quit smokeless tobacco use about 35 years ago.      Appropriate preventive services were discussed with this " patient, including applicable screening as appropriate for cardiovascular disease, diabetes, osteopenia/osteoporosis, and glaucoma.  As appropriate for age/gender, discussed screening for colorectal cancer, prostate cancer, breast cancer, and cervical cancer. Checklist reviewing preventive services available has been given to the patient.    Reviewed patients plan of care and provided an AVS. The Basic Care Plan (routine screening as documented in Health Maintenance) for Jared meets the Care Plan requirement. This Care Plan has been established and reviewed with the Patient.    Counseling Resources:  ATP IV Guidelines  Pooled Cohorts Equation Calculator  Breast Cancer Risk Calculator  Breast Cancer: Medication to Reduce Risk  FRAX Risk Assessment  ICSI Preventive Guidelines  Dietary Guidelines for Americans, 2010  USDA's MyPlate  ASA Prophylaxis  Lung CA Screening    DONNIE GARCIA MD  [facility]    Identified Health Risks:

## 2021-10-06 NOTE — NURSING NOTE
"Chief Complaint   Patient presents with     Medicare Visit     Patient is here for Medicare exam     Initial /86 (BP Location: Right arm, Patient Position: Sitting, Cuff Size: Adult Regular)   Pulse 67   Temp 97.5  F (36.4  C) (Tympanic)   Resp 16   Wt 69.2 kg (152 lb 9.6 oz)   SpO2 96%   BMI 23.90 kg/m   Estimated body mass index is 23.9 kg/m  as calculated from the following:    Height as of 9/30/21: 1.702 m (5' 7\").    Weight as of this encounter: 69.2 kg (152 lb 9.6 oz).  Medication Reconciliation: complete    Jennifer Boston MA     FOOD SECURITY SCREENING QUESTIONS  Hunger Vital Signs:  Within the past 12 months we worried whether our food would run out before we got money to buy more. Never  Within the past 12 months the food we bought just didn't last and we didn't have money to get more. Never     Jennifer Boston MA 10/6/2021 2:08 PM    "

## 2021-10-06 NOTE — PATIENT INSTRUCTIONS
Your exam today is normal including your blood pressure.    Continue with your current medications although we will combine the hydrochlorothiazide and losartan into 1 tablet called Hyzaar 100/12.51 daily.  This prescription was sent to your pharmacy.    We elected not to do blood work today as you recently had some done.    Assuming all goes well, follow-up annually.

## 2021-10-07 ASSESSMENT — ANXIETY QUESTIONNAIRES: GAD7 TOTAL SCORE: 0

## 2021-10-08 ENCOUNTER — IMMUNIZATION (OUTPATIENT)
Dept: FAMILY MEDICINE | Facility: OTHER | Age: 76
End: 2021-10-08
Attending: FAMILY MEDICINE
Payer: COMMERCIAL

## 2021-10-08 PROCEDURE — 91300 PR COVID VAC PFIZER DIL RECON 30 MCG/0.3 ML IM: CPT

## 2021-11-04 DIAGNOSIS — I70.212 ATHEROSCLEROSIS OF NATIVE ARTERY OF LEFT LOWER EXTREMITY WITH INTERMITTENT CLAUDICATION (H): ICD-10-CM

## 2021-11-04 NOTE — TELEPHONE ENCOUNTER
[facility] sent Rx request for the following:   Requested Prescriptions   Pending Prescriptions Disp Refills     clopidogrel (PLAVIX) 75 MG tablet [Pharmacy Med Name: CLOPIDOGREL 75MG TABLETS] 90 tablet 3     Sig: TAKE 1 TABLET(75 MG) BY MOUTH DAILY       Plavix Passed - 11/4/2021 12:52 PM      Last Prescription Date:   7/13/2021  Last Fill Qty/Refills:         90, R-3    Last Office Visit:              10/6/2021 (Nghia)   Future Office visit:           None noted   Routing refill request to provider for review/approval Sharon Carey RN ....................  11/4/2021   3:36 PM

## 2021-11-08 DIAGNOSIS — I70.212 ATHEROSCLEROSIS OF NATIVE ARTERY OF LEFT LOWER EXTREMITY WITH INTERMITTENT CLAUDICATION (H): ICD-10-CM

## 2021-11-08 RX ORDER — ATORVASTATIN CALCIUM 20 MG/1
20 TABLET, FILM COATED ORAL DAILY
Qty: 90 TABLET | Refills: 3 | Status: SHIPPED | OUTPATIENT
Start: 2021-11-08 | End: 2022-10-10

## 2021-11-08 RX ORDER — CLOPIDOGREL BISULFATE 75 MG/1
TABLET ORAL
Qty: 90 TABLET | Refills: 3 | Status: CANCELLED | OUTPATIENT
Start: 2021-11-08

## 2021-11-08 RX ORDER — CLOPIDOGREL BISULFATE 75 MG/1
TABLET ORAL
Qty: 90 TABLET | Refills: 3 | Status: SHIPPED | OUTPATIENT
Start: 2021-11-08 | End: 2022-10-10

## 2021-11-08 NOTE — TELEPHONE ENCOUNTER
Needing new prescriptions for Lipitor and Plavix sent to [facility] in [place]. Pt is out

## 2021-11-08 NOTE — TELEPHONE ENCOUNTER
Pt has been out of town for a while   Pt would like to have a call back regarding his results   Please advise clopidogrel (PLAVIX) 75 MG tablet 90 tablet 3 11/8/2021  No   Sig: TAKE 1 TABLET(75 MG) BY MOUTH DAILY     To [facility].    Routing refill request to provider for review/approval because:  Labs not current:  Lipid  Noted as historical    LOV: 10/6/2021    Cherri Flores RN on 11/8/2021 at 9:36 AM

## 2021-11-16 DIAGNOSIS — I10 ESSENTIAL HYPERTENSION: ICD-10-CM

## 2021-11-16 RX ORDER — LOSARTAN POTASSIUM 50 MG/1
TABLET ORAL
Qty: 90 TABLET | Refills: 3 | Status: SHIPPED | OUTPATIENT
Start: 2021-11-16 | End: 2022-06-20

## 2021-12-09 ENCOUNTER — TRANSFERRED RECORDS (OUTPATIENT)
Dept: HEALTH INFORMATION MANAGEMENT | Facility: OTHER | Age: 76
End: 2021-12-09
Payer: COMMERCIAL

## 2022-01-06 ENCOUNTER — TRANSFERRED RECORDS (OUTPATIENT)
Dept: HEALTH INFORMATION MANAGEMENT | Facility: OTHER | Age: 77
End: 2022-01-06
Payer: COMMERCIAL

## 2022-03-07 DIAGNOSIS — I70.212 ATHEROSCLEROSIS OF NATIVE ARTERY OF LEFT LOWER EXTREMITY WITH INTERMITTENT CLAUDICATION (H): ICD-10-CM

## 2022-03-09 RX ORDER — ATORVASTATIN CALCIUM 20 MG/1
TABLET, FILM COATED ORAL
Qty: 90 TABLET | Refills: 3 | OUTPATIENT
Start: 2022-03-09

## 2022-03-09 NOTE — TELEPHONE ENCOUNTER
Filled 11/08/2021 #90 x 3. Due 11/08/22. Pharmacy alerted. Unable to complete prescription refill per RNMedication Refill Policy.................... Nayeli Ledesma RN ....................  3/9/2022   9:14 AM      atorvastatin (LIPITOR) 20 MG tablet 90 tablet 3 11/8/2021  No   Sig - Route: Take 1 tablet (20 mg) by mouth daily - Oral   Sent to pharmacy as: Atorvastatin Calcium 20 MG Oral Tablet (LIPITOR)   Class: E-Prescribe   Order: [id number]   E-Prescribing Status: Receipt confirmed by pharmacy (11/8/2021  9:48 AM CST)       [id number] - [place], [place] - [address] AT [address]

## 2022-05-11 ENCOUNTER — TELEPHONE (OUTPATIENT)
Dept: SURGERY | Facility: OTHER | Age: 77
End: 2022-05-11
Payer: COMMERCIAL

## 2022-05-11 NOTE — TELEPHONE ENCOUNTER
Patient is scheduled on 06/27/2022 for a colonoscopy. The patient is on Plavix. Please advise when he should stop taking this medication prior to the procedure.  Thank  You.Fiona Bronson on 5/11/2022 at 11:36 AM

## 2022-05-25 ENCOUNTER — ALLIED HEALTH/NURSE VISIT (OUTPATIENT)
Dept: FAMILY MEDICINE | Facility: OTHER | Age: 77
End: 2022-05-25
Attending: INTERNAL MEDICINE
Payer: COMMERCIAL

## 2022-05-25 DIAGNOSIS — Z23 HIGH PRIORITY FOR 2019-NCOV VACCINE: Primary | ICD-10-CM

## 2022-05-25 PROCEDURE — 0054A COVID-19,PF,PFIZER (12+ YRS): CPT

## 2022-06-21 PROBLEM — F98.8 ATTENTION DEFICIT DISORDER: Status: RESOLVED | Noted: 2018-01-25 | Resolved: 2022-06-21

## 2022-06-21 PROBLEM — Z86.0101 H/O ADENOMATOUS POLYP OF COLON: Status: ACTIVE | Noted: 2018-01-25

## 2022-06-27 ENCOUNTER — ANESTHESIA EVENT (OUTPATIENT)
Dept: SURGERY | Facility: OTHER | Age: 77
End: 2022-06-27
Payer: COMMERCIAL

## 2022-06-27 ENCOUNTER — ANESTHESIA (OUTPATIENT)
Dept: SURGERY | Facility: OTHER | Age: 77
End: 2022-06-27
Payer: COMMERCIAL

## 2022-06-27 ENCOUNTER — HOSPITAL ENCOUNTER (OUTPATIENT)
Facility: OTHER | Age: 77
Discharge: HOME | End: 2022-06-27
Attending: SURGERY | Admitting: SURGERY
Payer: COMMERCIAL

## 2022-06-27 VITALS
HEART RATE: 63 BPM | WEIGHT: 152 LBS | BODY MASS INDEX: 23.86 KG/M2 | HEIGHT: 67 IN | SYSTOLIC BLOOD PRESSURE: 130 MMHG | TEMPERATURE: 98.8 F | DIASTOLIC BLOOD PRESSURE: 75 MMHG | OXYGEN SATURATION: 96 % | RESPIRATION RATE: 18 BRPM

## 2022-06-27 PROBLEM — K57.30 SIGMOID DIVERTICULOSIS: Status: ACTIVE | Noted: 2022-06-27

## 2022-06-27 PROBLEM — K63.5 COLON POLYPS: Status: ACTIVE | Noted: 2022-06-27

## 2022-06-27 PROCEDURE — 45380 COLONOSCOPY AND BIOPSY: CPT | Performed by: SURGERY

## 2022-06-27 PROCEDURE — 45384 COLONOSCOPY W/LESION REMOVAL: CPT | Mod: PT | Performed by: SURGERY

## 2022-06-27 PROCEDURE — 999N000010 HC STATISTIC ANES STAT CODE-CRNA PER MINUTE: Performed by: SURGERY

## 2022-06-27 PROCEDURE — 99100 ANES PT EXTEME AGE<1 YR&>70: CPT | Performed by: NURSE ANESTHETIST, CERTIFIED REGISTERED

## 2022-06-27 PROCEDURE — 45384 COLONOSCOPY W/LESION REMOVAL: CPT | Performed by: NURSE ANESTHETIST, CERTIFIED REGISTERED

## 2022-06-27 PROCEDURE — 250N000011 HC RX IP 250 OP 636: Performed by: NURSE ANESTHETIST, CERTIFIED REGISTERED

## 2022-06-27 PROCEDURE — 250N000009 HC RX 250: Performed by: NURSE ANESTHETIST, CERTIFIED REGISTERED

## 2022-06-27 PROCEDURE — 258N000003 HC RX IP 258 OP 636: Performed by: SURGERY

## 2022-06-27 PROCEDURE — 88305 TISSUE EXAM BY PATHOLOGIST: CPT

## 2022-06-27 RX ORDER — LIDOCAINE HYDROCHLORIDE 20 MG/ML
INJECTION, SOLUTION INFILTRATION; PERINEURAL PRN
Status: DISCONTINUED | OUTPATIENT
Start: 2022-06-27 | End: 2022-06-27

## 2022-06-27 RX ORDER — NALOXONE HYDROCHLORIDE 0.4 MG/ML
0.4 INJECTION, SOLUTION INTRAMUSCULAR; INTRAVENOUS; SUBCUTANEOUS
Status: DISCONTINUED | OUTPATIENT
Start: 2022-06-27 | End: 2022-06-27 | Stop reason: HOSPADM

## 2022-06-27 RX ORDER — ONDANSETRON 2 MG/ML
4 INJECTION INTRAMUSCULAR; INTRAVENOUS
Status: DISCONTINUED | OUTPATIENT
Start: 2022-06-27 | End: 2022-06-27 | Stop reason: HOSPADM

## 2022-06-27 RX ORDER — NALOXONE HYDROCHLORIDE 0.4 MG/ML
0.2 INJECTION, SOLUTION INTRAMUSCULAR; INTRAVENOUS; SUBCUTANEOUS
Status: DISCONTINUED | OUTPATIENT
Start: 2022-06-27 | End: 2022-06-27 | Stop reason: HOSPADM

## 2022-06-27 RX ORDER — SODIUM CHLORIDE, SODIUM LACTATE, POTASSIUM CHLORIDE, CALCIUM CHLORIDE 600; 310; 30; 20 MG/100ML; MG/100ML; MG/100ML; MG/100ML
INJECTION, SOLUTION INTRAVENOUS CONTINUOUS
Status: DISCONTINUED | OUTPATIENT
Start: 2022-06-27 | End: 2022-06-27 | Stop reason: HOSPADM

## 2022-06-27 RX ORDER — PROPOFOL 10 MG/ML
INJECTION, EMULSION INTRAVENOUS PRN
Status: DISCONTINUED | OUTPATIENT
Start: 2022-06-27 | End: 2022-06-27

## 2022-06-27 RX ORDER — LIDOCAINE 40 MG/G
CREAM TOPICAL
Status: DISCONTINUED | OUTPATIENT
Start: 2022-06-27 | End: 2022-06-27 | Stop reason: HOSPADM

## 2022-06-27 RX ORDER — PROPOFOL 10 MG/ML
INJECTION, EMULSION INTRAVENOUS CONTINUOUS PRN
Status: DISCONTINUED | OUTPATIENT
Start: 2022-06-27 | End: 2022-06-27

## 2022-06-27 RX ORDER — FLUMAZENIL 0.1 MG/ML
0.2 INJECTION, SOLUTION INTRAVENOUS
Status: DISCONTINUED | OUTPATIENT
Start: 2022-06-27 | End: 2022-06-27 | Stop reason: HOSPADM

## 2022-06-27 RX ADMIN — PROPOFOL 100 MG: 10 INJECTION, EMULSION INTRAVENOUS at 09:50

## 2022-06-27 RX ADMIN — SODIUM CHLORIDE, POTASSIUM CHLORIDE, SODIUM LACTATE AND CALCIUM CHLORIDE: 600; 310; 30; 20 INJECTION, SOLUTION INTRAVENOUS at 09:25

## 2022-06-27 RX ADMIN — PROPOFOL 160 MCG/KG/MIN: 10 INJECTION, EMULSION INTRAVENOUS at 09:50

## 2022-06-27 RX ADMIN — LIDOCAINE HYDROCHLORIDE 40 MG: 20 INJECTION, SOLUTION INFILTRATION; PERINEURAL at 09:50

## 2022-06-27 NOTE — ANESTHESIA PREPROCEDURE EVALUATION
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    On plavix per vascular surgeon     Enlarged prostate without lower urinary tract symptoms (luts)     No Comments Provided     Essential (primary) hypertension     No Comments Provided     GERD (gastroesophageal reflux disease)      Hyperlipidemia     No Comments Provided     DUNIA on CPAP       Past Surgical History:   Procedure Laterality Date     APPENDECTOMY OPEN      No Comments Provided     ARTHROSCOPY KNEE Left      AS REMOVAL OF KIDNEY STONE       COLONOSCOPY  2017    Adenomatous, f/u in      CYSTOSCOPY, TRANSURETHRAL RESECTION (TUR) PROSTATE, COMBINED      No Comments Provided     EMBOLECTOMY LOWER EXTREMITY  2017    Popliteal     HERNIORRHAPHY INGUINAL BILATERAL      No Comments Provided     OPEN REDUCTION INTERNAL FIXATION FEMUR DISTAL Left       Allergies   Allergen Reactions     Thiopental      Other reaction(s): *Unknown - Childhood Rxn  Patient states \"almost  from Pentothal as child\"     Cephalexin Hives     Trazodone Difficulty breathing     Aspirin      Other reaction(s): GI Upset     Ciprofloxacin Hives      Social History     Tobacco Use     Smoking status: Former Smoker     Packs/day: 1.00     Years: 30.00     Pack years: 30.00     Types: Cigarettes     Quit date: 1983     Years since quittin.8     Smokeless tobacco: Former User     Quit date: 1986   Substance Use Topics     Alcohol use: No     Alcohol/week: 0.0 standard drinks      Wt Readings from Last 1 Encounters:   22 68.9 kg (152 lb)        Anesthesia Evaluation   Pt has had " prior anesthetic.     History of anesthetic complications   Allergy to thiopental.    ROS/MED HX  ENT/Pulmonary:     (+) sleep apnea, uses CPAP, Intermittent, asthma Treatment: Inhaler prn,      Neurologic:  - neg neurologic ROS     Cardiovascular:  - neg cardiovascular ROS   (+) hypertension-----    METS/Exercise Tolerance: >4 METS    Hematologic:  - neg hematologic  ROS     Musculoskeletal:  - neg musculoskeletal ROS     GI/Hepatic:     (+) GERD,     Renal/Genitourinary:     (+) Nephrolithiasis ,     Endo:  - neg endo ROS     Psychiatric/Substance Use:     (+) psychiatric history anxiety     Infectious Disease:  - neg infectious disease ROS     Malignancy:  - neg malignancy ROS     Other:  - neg other ROS          Physical Exam    Airway  airway exam normal      Mallampati: II   TM distance: > 3 FB   Neck ROM: full   Mouth opening: > 3 cm    Respiratory Devices and Support         Dental  no notable dental history         Cardiovascular   cardiovascular exam normal       Rhythm and rate: regular and normal     Pulmonary   pulmonary exam normal        breath sounds clear to auscultation           OUTSIDE LABS:  CBC:   Lab Results   Component Value Date    WBC 7.2 07/04/2021    WBC 16.5 (H) 07/02/2021    HGB 14.1 07/04/2021    HGB 14.2 07/02/2021    HCT 40.9 07/04/2021    HCT 40.5 07/02/2021     07/04/2021     07/02/2021     BMP:   Lab Results   Component Value Date     07/04/2021     (L) 07/02/2021    POTASSIUM 3.8 07/04/2021    POTASSIUM 3.8 07/02/2021    CHLORIDE 105 07/04/2021    CHLORIDE 96 (L) 07/02/2021    CO2 26 07/04/2021    CO2 27 07/02/2021    BUN 16 07/04/2021    BUN 21 07/02/2021    CR 0.86 07/04/2021    CR 1.04 07/02/2021     (H) 07/04/2021     (H) 07/02/2021     COAGS: No results found for: PTT, INR, FIBR  POC: No results found for: BGM, HCG, HCGS  HEPATIC:   Lab Results   Component Value Date    ALBUMIN 3.2 (L) 07/04/2021    PROTTOTAL 6.9 07/04/2021    ALT 32  07/04/2021    AST 27 07/04/2021    ALKPHOS 76 07/04/2021    BILITOTAL 0.3 07/04/2021     OTHER:   Lab Results   Component Value Date    LACT 1.9 07/02/2021    SKYE 9.2 07/04/2021    CRP 72.6 (H) 07/05/2021       Anesthesia Plan    ASA Status:  2   NPO Status:  NPO Appropriate    Anesthesia Type: MAC.   Induction: Propofol.           Consents    Anesthesia Plan(s) and associated risks, benefits, and realistic alternatives discussed. Questions answered and patient/representative(s) expressed understanding.     - Discussed: Risks, Benefits and Alternatives for BOTH SEDATION and the PROCEDURE were discussed     - Discussed with:  Patient      - Extended Intubation/Ventilatory Support Discussed: No.      - Patient is DNR/DNI Status: No    Use of blood products discussed: Yes.     - Discussed with: Patient.     - Consented: consented to blood products            Reason for refusal: other.     Postoperative Care            Comments:                KIANNA TOLEDO CRNA

## 2022-06-27 NOTE — ANESTHESIA POSTPROCEDURE EVALUATION
Patient: Jared Parry    Procedure: Procedure(s):  COLONOSCOPY, WITH POLYPECTOMY       Anesthesia Type:  MAC    Note:  Disposition: Outpatient   Postop Pain Control: Uneventful            Sign Out: Well controlled pain   PONV: No   Neuro/Psych: Uneventful            Sign Out: Acceptable/Baseline neuro status   Airway/Respiratory: Uneventful            Sign Out: Acceptable/Baseline resp. status   CV/Hemodynamics: Uneventful            Sign Out: Acceptable CV status; No obvious hypovolemia; No obvious fluid overload   Other NRE: NONE   DID A NON-ROUTINE EVENT OCCUR? No           Last vitals:  Vitals Value Taken Time   /75 06/27/22 1100   Temp 98.8  F (37.1  C) 06/27/22 1024   Pulse 63 06/27/22 1100   Resp     SpO2 97 % 06/27/22 1106   Vitals shown include unvalidated device data.    Electronically Signed By: KIANNA TOLEDO CRNA  June 27, 2022  11:07 AM

## 2022-06-27 NOTE — ANESTHESIA CARE TRANSFER NOTE
Patient: Jared Parry    Procedure: Procedure(s):  COLONOSCOPY, WITH POLYPECTOMY       Diagnosis: History of colon polyps [Z86.010]  Diagnosis Additional Information: No value filed.    Anesthesia Type:   MAC     Note:    Oropharynx: oropharynx clear of all foreign objects and spontaneously breathing  Level of Consciousness: drowsy  Oxygen Supplementation: nasal cannula  Level of Supplemental Oxygen (L/min / FiO2): 2  Independent Airway: airway patency satisfactory and stable  Dentition: dentition unchanged  Vital Signs Stable: post-procedure vital signs reviewed and stable  Report to RN Given: handoff report given  Patient transferred to: Phase II    Handoff Report: Identifed the Patient, Identified the Reponsible Provider, Reviewed the pertinent medical history, Discussed the surgical course, Reviewed Intra-OP anesthesia mangement and issues during anesthesia, Set expectations for post-procedure period and Allowed opportunity for questions and acknowledgement of understanding      Vitals:  Vitals Value Taken Time   BP     Temp     Pulse     Resp     SpO2         Electronically Signed By: KIANNA Lopez CRNA  June 27, 2022  10:25 AM

## 2022-06-27 NOTE — OP NOTE
PROCEDURE NOTE    DATE OF SERVICE: 6/27/2022    SURGEON: Shaheen Thompson MD    PRE-OP DIAGNOSIS:      History of Polyps        POST-OP DIAGNOSIS:  Same  Sigmoid Diverticulosis  Polyps at cecum and HF    PROCEDURE:     Colonoscopy with hot biopsy      ANESTHESIA:  NILDA Bajwa CRNA    INDICATION FOR THE PROCEDURE: The patient is a 76 year old male with h/o polyps . The patient has no other complaints  . After explaining the risks to include bleeding, perforation, potential inability toreach the cecum, the patient wished to proceed.    PROCEDURE:After adequate sedation, the patient was in the left lateral decubitus position.  Rectal exam was performed.  There was normal tone and no palpable masses .  The colonoscope was introduced into the rectum and advanced to the cecum with Mild difficulty.  The patient's prep was good.  The terminal cecum was reached.  The cecum, ascending, transverse, descending and sigmoid colon was with sigmoid diverticulosis and diminutive polyps at cecum and HF x 2 that were hot biopsied and destroyed .  The scope was retroflexed in the rectum.  The rectum was unremarkable  .  The scope was straightened and removed.  The patient tolerated the procedure well.     ESTIMATED BLOOD LOSS: none    COMPLICATIONS:  None    TISSUE REMOVED:  Yes    RECOMMEND:      Follow-up pending pathology  Fiber  Given literature on diverticulosis    Shaheen Thompson MD FACS

## 2022-06-28 LAB
PATH REPORT.COMMENTS IMP SPEC: NORMAL
PATH REPORT.FINAL DX SPEC: NORMAL
PATH REPORT.RELEVANT HX SPEC: NORMAL
PHOTO IMAGE: NORMAL

## 2022-06-29 PROBLEM — K63.5 COLON POLYPS: Status: RESOLVED | Noted: 2022-06-27 | Resolved: 2022-06-29

## 2022-08-10 ENCOUNTER — TELEPHONE (OUTPATIENT)
Dept: FAMILY MEDICINE | Facility: OTHER | Age: 77
End: 2022-08-10

## 2022-08-22 ENCOUNTER — OFFICE VISIT (OUTPATIENT)
Dept: PEDIATRICS | Facility: OTHER | Age: 77
End: 2022-08-22
Attending: INTERNAL MEDICINE
Payer: COMMERCIAL

## 2022-08-22 ENCOUNTER — TELEPHONE (OUTPATIENT)
Dept: PEDIATRICS | Facility: OTHER | Age: 77
End: 2022-08-22

## 2022-08-22 VITALS
BODY MASS INDEX: 24.09 KG/M2 | WEIGHT: 153.8 LBS | OXYGEN SATURATION: 98 % | DIASTOLIC BLOOD PRESSURE: 86 MMHG | SYSTOLIC BLOOD PRESSURE: 154 MMHG | RESPIRATION RATE: 20 BRPM | TEMPERATURE: 98.1 F

## 2022-08-22 DIAGNOSIS — M25.571 PAIN IN JOINT INVOLVING ANKLE AND FOOT, RIGHT: Primary | ICD-10-CM

## 2022-08-22 LAB
ANION GAP SERPL CALCULATED.3IONS-SCNC: 6 MMOL/L (ref 3–14)
BASOPHILS # BLD AUTO: 0.1 10E3/UL (ref 0–0.2)
BASOPHILS NFR BLD AUTO: 1 %
BUN SERPL-MCNC: 14 MG/DL (ref 7–25)
CALCIUM SERPL-MCNC: 9 MG/DL (ref 8.6–10.3)
CHLORIDE BLD-SCNC: 93 MMOL/L (ref 98–107)
CO2 SERPL-SCNC: 29 MMOL/L (ref 21–31)
CREAT SERPL-MCNC: 0.78 MG/DL (ref 0.7–1.3)
CRP SERPL-MCNC: 1.6 MG/L
EOSINOPHIL # BLD AUTO: 0.3 10E3/UL (ref 0–0.7)
EOSINOPHIL NFR BLD AUTO: 4 %
ERYTHROCYTE [DISTWIDTH] IN BLOOD BY AUTOMATED COUNT: 12.1 % (ref 10–15)
ERYTHROCYTE [SEDIMENTATION RATE] IN BLOOD BY WESTERGREN METHOD: 8 MM/HR (ref 0–20)
GFR SERPL CREATININE-BSD FRML MDRD: >90 ML/MIN/1.73M2
GLUCOSE BLD-MCNC: 112 MG/DL (ref 70–105)
HCT VFR BLD AUTO: 37.4 % (ref 40–53)
HGB BLD-MCNC: 13.6 G/DL (ref 13.3–17.7)
IMM GRANULOCYTES # BLD: 0 10E3/UL
IMM GRANULOCYTES NFR BLD: 0 %
LYMPHOCYTES # BLD AUTO: 1.8 10E3/UL (ref 0.8–5.3)
LYMPHOCYTES NFR BLD AUTO: 20 %
MCH RBC QN AUTO: 31.3 PG (ref 26.5–33)
MCHC RBC AUTO-ENTMCNC: 36.4 G/DL (ref 31.5–36.5)
MCV RBC AUTO: 86 FL (ref 78–100)
MONOCYTES # BLD AUTO: 0.9 10E3/UL (ref 0–1.3)
MONOCYTES NFR BLD AUTO: 10 %
NEUTROPHILS # BLD AUTO: 6 10E3/UL (ref 1.6–8.3)
NEUTROPHILS NFR BLD AUTO: 65 %
NRBC # BLD AUTO: 0 10E3/UL
NRBC BLD AUTO-RTO: 0 /100
PLATELET # BLD AUTO: 208 10E3/UL (ref 150–450)
POTASSIUM BLD-SCNC: 3.8 MMOL/L (ref 3.5–5.1)
RBC # BLD AUTO: 4.35 10E6/UL (ref 4.4–5.9)
SODIUM SERPL-SCNC: 128 MMOL/L (ref 134–144)
URATE SERPL-MCNC: 4.1 MG/DL (ref 4.4–7.6)
WBC # BLD AUTO: 9.1 10E3/UL (ref 4–11)

## 2022-08-22 PROCEDURE — 99213 OFFICE O/P EST LOW 20 MIN: CPT | Performed by: INTERNAL MEDICINE

## 2022-08-22 PROCEDURE — 86140 C-REACTIVE PROTEIN: CPT | Mod: ZL | Performed by: INTERNAL MEDICINE

## 2022-08-22 PROCEDURE — G0463 HOSPITAL OUTPT CLINIC VISIT: HCPCS

## 2022-08-22 PROCEDURE — 84550 ASSAY OF BLOOD/URIC ACID: CPT | Mod: ZL | Performed by: INTERNAL MEDICINE

## 2022-08-22 PROCEDURE — 85014 HEMATOCRIT: CPT | Mod: ZL | Performed by: INTERNAL MEDICINE

## 2022-08-22 PROCEDURE — 86618 LYME DISEASE ANTIBODY: CPT | Mod: ZL | Performed by: INTERNAL MEDICINE

## 2022-08-22 PROCEDURE — 36415 COLL VENOUS BLD VENIPUNCTURE: CPT | Mod: ZL | Performed by: INTERNAL MEDICINE

## 2022-08-22 PROCEDURE — 80048 BASIC METABOLIC PNL TOTAL CA: CPT | Mod: ZL | Performed by: INTERNAL MEDICINE

## 2022-08-22 PROCEDURE — 85652 RBC SED RATE AUTOMATED: CPT | Mod: ZL | Performed by: INTERNAL MEDICINE

## 2022-08-22 RX ORDER — AMLODIPINE BESYLATE 10 MG/1
TABLET ORAL
COMMUNITY
End: 2022-10-10

## 2022-08-22 RX ORDER — INHALER, ASSIST DEVICES
SPACER (EA) MISCELLANEOUS
COMMUNITY
Start: 2022-05-17 | End: 2022-10-10

## 2022-08-22 RX ORDER — CYCLOSPORINE 0.5 MG/ML
EMULSION OPHTHALMIC
COMMUNITY
End: 2022-10-10

## 2022-08-22 RX ORDER — INDOMETHACIN 50 MG/1
50 CAPSULE ORAL 2 TIMES DAILY WITH MEALS
Qty: 6 CAPSULE | Refills: 0 | Status: SHIPPED | OUTPATIENT
Start: 2022-08-22 | End: 2022-08-22

## 2022-08-22 RX ORDER — ALBUTEROL SULFATE 90 UG/1
AEROSOL, METERED RESPIRATORY (INHALATION)
COMMUNITY
Start: 2022-05-17 | End: 2023-06-27

## 2022-08-22 RX ORDER — PREDNISONE 20 MG/1
20 TABLET ORAL DAILY
Qty: 5 TABLET | Refills: 0 | Status: SHIPPED | OUTPATIENT
Start: 2022-08-22 | End: 2022-08-27

## 2022-08-22 ASSESSMENT — PAIN SCALES - GENERAL: PAINLEVEL: MILD PAIN (2)

## 2022-08-22 ASSESSMENT — ASTHMA QUESTIONNAIRES
ACT_TOTALSCORE: 14
ACT_TOTALSCORE: 14
QUESTION_4 LAST FOUR WEEKS HOW OFTEN HAVE YOU USED YOUR RESCUE INHALER OR NEBULIZER MEDICATION (SUCH AS ALBUTEROL): TWO OR THREE TIMES PER WEEK
QUESTION_3 LAST FOUR WEEKS HOW OFTEN DID YOUR ASTHMA SYMPTOMS (WHEEZING, COUGHING, SHORTNESS OF BREATH, CHEST TIGHTNESS OR PAIN) WAKE YOU UP AT NIGHT OR EARLIER THAN USUAL IN THE MORNING: ONCE OR TWICE
QUESTION_5 LAST FOUR WEEKS HOW WOULD YOU RATE YOUR ASTHMA CONTROL: NOT CONTROLLED AT ALL
QUESTION_2 LAST FOUR WEEKS HOW OFTEN HAVE YOU HAD SHORTNESS OF BREATH: THREE TO SIX TIMES A WEEK
QUESTION_1 LAST FOUR WEEKS HOW MUCH OF THE TIME DID YOUR ASTHMA KEEP YOU FROM GETTING AS MUCH DONE AT WORK, SCHOOL OR AT HOME: SOME OF THE TIME

## 2022-08-22 NOTE — TELEPHONE ENCOUNTER
Their Ins needs you to call and give prior auth on Indomeshacin [phone] so [facility] can fill. As high for over 65 would like to know also why? Call [phone] and then call patient also

## 2022-08-22 NOTE — TELEPHONE ENCOUNTER
Patient is wondering if there is something else with less side effects.  Wondering if the labs were ok if he should take it for the gout at all.   notified the PA is in process.   Lucila Sweeney LPN .............8/22/2022     3:33 PM

## 2022-08-22 NOTE — TELEPHONE ENCOUNTER
Patient notified of script sent to pharmacy.   Lucila Sweeney LPN .............8/22/2022     4:16 PM

## 2022-08-22 NOTE — NURSING NOTE
Patient is here for right foot pain.     Medication Reconciliation: complete      Lucila Sweeney LPN 8/22/2022 1:21 PM       Advance care directive on file? yes  Advance care directive provided to patient? na       Lucila Sweeney LPN

## 2022-08-22 NOTE — PROGRESS NOTES
Assessment & Plan   1. Pain in joint involving ankle and foot, right  Differential diagnosis includes acute on chronic osteoarthritis, Lyme arthritis, sprain of medial ankle, plantars fasciitis, gout, inflammatory arthritides, emerging cellulitis or septic arthritis, others.  We had a lengthy discussion today with regard to this differential.  Ultimately we decided on obtaining lab work as outlined below.  A short course of Indocin is recommended.  Warning signs discussed and prompt repeat evaluation recommended if symptoms persist or worsen.  - Uric acid; Future  - CBC with Platelets & Differential; Future  - Erythrocyte sedimentation rate auto; Future  - CRP inflammation; Future  - Basic metabolic panel; Future  - Lyme Disease Total Abs Bld with Reflex to Confirm CLIA; Future  - indomethacin (INDOCIN) 50 MG capsule; Take 1 capsule (50 mg) by mouth 2 times daily (with meals) for 3 days  Dispense: 6 capsule; Refill: 0  - Lyme Disease Total Abs Bld with Reflex to Confirm CLIA  - Basic metabolic panel  - CRP inflammation  - Erythrocyte sedimentation rate auto  - CBC with Platelets & Differential  - Uric acid      Patient Instructions    -- Lab today   -- Indomethacin x 3 days   -- Return if worse          Return if symptoms worsen or fail to improve.    Signed, Papo Mo MD, FAAP, FACP  Internal Medicine & Pediatrics    Subjective   Jared Parry is a 76 year old male who presents for right foot pain.  Is been present for about 6 weeks.  It started out in the left where it looked black and blue on the top and bottom of the foot.  After about a week it was gone.  Now its moved into the right foot.  It hurts more along the medial aspect of the right foot and ankle.  Worse with walking and better with rest.  Seems to be worse throughout the day and into the end of the day and better again in the morning.    Objective   Vitals: BP (!) 154/86   Temp 98.1  F (36.7  C) (Tympanic)   Resp 20   Wt 69.8 kg (153 lb 12.8  oz)   SpO2 98%   BMI 24.09 kg/m      Musculoskeletal: There is some slight swelling of the right medial distal ankle.  There is no bony tenderness to palpation.  No erythema or warmth.

## 2022-08-24 LAB — B BURGDOR IGG+IGM SER QL: 0.03

## 2022-09-06 DIAGNOSIS — M79.671 RIGHT FOOT PAIN: Primary | ICD-10-CM

## 2022-09-08 ENCOUNTER — APPOINTMENT (OUTPATIENT)
Dept: FAMILY MEDICINE | Facility: OTHER | Age: 77
End: 2022-09-08
Attending: NURSE PRACTITIONER

## 2022-09-08 ENCOUNTER — APPOINTMENT (OUTPATIENT)
Dept: FAMILY MEDICINE | Facility: OTHER | Age: 77
End: 2022-09-08
Attending: CHIROPRACTOR

## 2022-09-08 PROCEDURE — 99499 UNLISTED E&M SERVICE: CPT | Performed by: CHIROPRACTOR

## 2022-09-08 PROCEDURE — 93000 ELECTROCARDIOGRAM COMPLETE: CPT | Performed by: INTERNAL MEDICINE

## 2022-09-08 PROCEDURE — 99212 OFFICE O/P EST SF 10 MIN: CPT | Performed by: NURSE PRACTITIONER

## 2022-09-28 ENCOUNTER — IMMUNIZATION (OUTPATIENT)
Dept: FAMILY MEDICINE | Facility: OTHER | Age: 77
End: 2022-09-28
Attending: INTERNAL MEDICINE
Payer: COMMERCIAL

## 2022-09-28 DIAGNOSIS — Z23 HIGH PRIORITY FOR 2019-NCOV VACCINE: Primary | ICD-10-CM

## 2022-09-28 PROCEDURE — 91312 COVID-19,PF,PFIZER BOOSTER BIVALENT: CPT

## 2022-09-28 PROCEDURE — 0124A COVID-19,PF,PFIZER BOOSTER BIVALENT: CPT

## 2022-10-03 DIAGNOSIS — J45.20 MILD INTERMITTENT ASTHMA, UNCOMPLICATED: ICD-10-CM

## 2022-10-03 DIAGNOSIS — I10 ESSENTIAL HYPERTENSION: ICD-10-CM

## 2022-10-04 ASSESSMENT — ENCOUNTER SYMPTOMS
CHILLS: 0
SHORTNESS OF BREATH: 0
FREQUENCY: 0
NAUSEA: 0
ABDOMINAL PAIN: 0
DIARRHEA: 0
ARTHRALGIAS: 0
MYALGIAS: 0
PALPITATIONS: 0
DYSURIA: 0
HEARTBURN: 0
COUGH: 0
FEVER: 0
HEMATURIA: 0
CONSTIPATION: 0
DIZZINESS: 1
SORE THROAT: 0
HEMATOCHEZIA: 0
JOINT SWELLING: 0
NERVOUS/ANXIOUS: 0
EYE PAIN: 0
HEADACHES: 0
WEAKNESS: 0
PARESTHESIAS: 0

## 2022-10-04 ASSESSMENT — ACTIVITIES OF DAILY LIVING (ADL): CURRENT_FUNCTION: NO ASSISTANCE NEEDED

## 2022-10-05 RX ORDER — MONTELUKAST SODIUM 10 MG/1
TABLET ORAL
Qty: 90 TABLET | Refills: 3 | Status: SHIPPED | OUTPATIENT
Start: 2022-10-05 | End: 2023-06-27

## 2022-10-05 RX ORDER — LOSARTAN POTASSIUM AND HYDROCHLOROTHIAZIDE 12.5; 1 MG/1; MG/1
1 TABLET ORAL DAILY
Qty: 90 TABLET | Refills: 3 | Status: SHIPPED | OUTPATIENT
Start: 2022-10-05 | End: 2023-06-27

## 2022-10-10 ENCOUNTER — OFFICE VISIT (OUTPATIENT)
Dept: INTERNAL MEDICINE | Facility: OTHER | Age: 77
End: 2022-10-10
Attending: INTERNAL MEDICINE
Payer: COMMERCIAL

## 2022-10-10 VITALS
BODY MASS INDEX: 23.04 KG/M2 | RESPIRATION RATE: 18 BRPM | HEIGHT: 68 IN | OXYGEN SATURATION: 98 % | WEIGHT: 152 LBS | TEMPERATURE: 98.7 F | SYSTOLIC BLOOD PRESSURE: 132 MMHG | DIASTOLIC BLOOD PRESSURE: 74 MMHG | HEART RATE: 70 BPM

## 2022-10-10 DIAGNOSIS — F41.1 GAD (GENERALIZED ANXIETY DISORDER): ICD-10-CM

## 2022-10-10 DIAGNOSIS — G47.33 OSA ON CPAP: Primary | ICD-10-CM

## 2022-10-10 DIAGNOSIS — I10 PRIMARY HYPERTENSION: ICD-10-CM

## 2022-10-10 DIAGNOSIS — I74.3: ICD-10-CM

## 2022-10-10 DIAGNOSIS — K21.9 GASTROESOPHAGEAL REFLUX DISEASE WITHOUT ESOPHAGITIS: ICD-10-CM

## 2022-10-10 DIAGNOSIS — I70.212 ATHEROSCLEROSIS OF NATIVE ARTERY OF LEFT LOWER EXTREMITY WITH INTERMITTENT CLAUDICATION (H): ICD-10-CM

## 2022-10-10 DIAGNOSIS — G47.09 OTHER INSOMNIA: ICD-10-CM

## 2022-10-10 DIAGNOSIS — J45.20 MILD INTERMITTENT ASTHMA WITHOUT COMPLICATION: ICD-10-CM

## 2022-10-10 DIAGNOSIS — E78.5 HYPERLIPIDEMIA, MILD: ICD-10-CM

## 2022-10-10 PROBLEM — J45.909 ASTHMA: Status: RESOLVED | Noted: 2021-07-03 | Resolved: 2022-10-10

## 2022-10-10 PROCEDURE — G0463 HOSPITAL OUTPT CLINIC VISIT: HCPCS

## 2022-10-10 PROCEDURE — G0439 PPPS, SUBSEQ VISIT: HCPCS | Performed by: INTERNAL MEDICINE

## 2022-10-10 RX ORDER — FAMOTIDINE 10 MG
10 TABLET ORAL DAILY
COMMUNITY
Start: 2022-10-10 | End: 2024-09-11

## 2022-10-10 RX ORDER — ATORVASTATIN CALCIUM 20 MG/1
20 TABLET, FILM COATED ORAL DAILY
Qty: 90 TABLET | Refills: 3 | Status: SHIPPED | OUTPATIENT
Start: 2022-10-10 | End: 2023-06-27

## 2022-10-10 RX ORDER — CLOPIDOGREL BISULFATE 75 MG/1
75 TABLET ORAL DAILY
Qty: 90 TABLET | Refills: 3 | Status: SHIPPED | OUTPATIENT
Start: 2022-10-10 | End: 2023-06-27

## 2022-10-10 ASSESSMENT — ENCOUNTER SYMPTOMS
HEADACHES: 0
PARESTHESIAS: 0
DIZZINESS: 1
HEARTBURN: 0
HEMATOCHEZIA: 0
SHORTNESS OF BREATH: 0
HEMATURIA: 0
NAUSEA: 0
FEVER: 0
WEAKNESS: 0
SORE THROAT: 0
CONSTIPATION: 0
FREQUENCY: 0
PALPITATIONS: 0
JOINT SWELLING: 0
CHILLS: 0
EYE PAIN: 0
NERVOUS/ANXIOUS: 0
DIARRHEA: 0
MYALGIAS: 0
DYSURIA: 0
COUGH: 0
ARTHRALGIAS: 0
ABDOMINAL PAIN: 0

## 2022-10-10 ASSESSMENT — ANXIETY QUESTIONNAIRES
GAD7 TOTAL SCORE: 0
1. FEELING NERVOUS, ANXIOUS, OR ON EDGE: NOT AT ALL
6. BECOMING EASILY ANNOYED OR IRRITABLE: NOT AT ALL
3. WORRYING TOO MUCH ABOUT DIFFERENT THINGS: NOT AT ALL
5. BEING SO RESTLESS THAT IT IS HARD TO SIT STILL: NOT AT ALL
7. FEELING AFRAID AS IF SOMETHING AWFUL MIGHT HAPPEN: NOT AT ALL
2. NOT BEING ABLE TO STOP OR CONTROL WORRYING: NOT AT ALL
GAD7 TOTAL SCORE: 0

## 2022-10-10 ASSESSMENT — PATIENT HEALTH QUESTIONNAIRE - PHQ9: 5. POOR APPETITE OR OVEREATING: NOT AT ALL

## 2022-10-10 ASSESSMENT — PAIN SCALES - GENERAL: PAINLEVEL: NO PAIN (0)

## 2022-10-10 NOTE — PROGRESS NOTES
ICD-10-CM    1. DUNIA on CPAP  G47.33     Z99.89       2. Mild intermittent asthma without complication  J45.20       3. Gastroesophageal reflux disease without esophagitis  K21.9       4. Hyperlipidemia, mild  E78.5       5. Primary hypertension  I10       6. 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 Other insomnia  G47.09       8. JALYN (generalized anxiety disorder)  F41.1       9. Atherosclerosis of native artery of left lower extremity with intermittent claudication (H)  I70.212 atorvastatin (LIPITOR) 20 MG tablet     clopidogrel (PLAVIX) 75 MG tablet          He appears to be doing well.  Medications will continue without change and refills were done as needed.  We did not do any blood work as he apparently had this done recently with his [facility].  I encouraged him to get the influenza vaccine which he plans to do.  He thinks that he has already had the Shingrix vaccine.  Assuming all goes well, follow-up annually.      Shasha Coleman is a 76 year old, presenting for the following health issues:  Physical      HPI     He is here today for Medicare wellness visit.  In most respects he feels fine.  He has a history of hypertension and is treated appropriately.  No endorgan disease.  He has a history of hyperlipidemia and is on moderate intensity statin therapy that he tolerates without difficulty.  He does have a previous history of a popliteal embolism.  He is on chronic Plavix therapy per the recommendation of his cardiologist.  He does not have any issues taking the Plavix.    He has a history of reflux.  He is slowly weaning off of his Pepcid and he seems to be doing fine.  He has obstructive sleep apnea and wears CPAP on a nightly basis without any difficulty or concern.  He has a history of anxiety disorder, this is stable.  He has a history of asthma that is minimally symptomatic.  Overall, he tells me that he is really feeling quite good.    Medications are reconciled.   Past medical history, past surgical history, family history and social histories are reviewed and updated.  He is due for a flu shot but he wants to wait until he gets to [place].  I also talked him about the shingles vaccine.  Colonoscopy is up-to-date.      Current Outpatient Medications   Medication     albuterol (PROAIR HFA/PROVENTIL HFA/VENTOLIN HFA) 108 (90 Base) MCG/ACT inhaler     atorvastatin (LIPITOR) 20 MG tablet     clopidogrel (PLAVIX) 75 MG tablet     famotidine (PEPCID) 10 MG tablet     Lactobacillus (PROBIOTIC ACIDOPHILUS) TABS     losartan-hydrochlorothiazide (HYZAAR) 100-12.5 MG tablet     montelukast (SINGULAIR) 10 MG tablet     triamcinolone (NASACORT) 55 MCG/ACT nasal aerosol     zolpidem (AMBIEN) 5 MG tablet     No current facility-administered medications for this visit.       Social History     Socioeconomic History     Marital status:      Spouse name: Not on file     Number of children: Not on file     Years of education: Not on file     Highest education level: Not on file   Occupational History     Not on file   Tobacco Use     Smoking status: Former     Packs/day: 1.00     Years: 30.00     Pack years: 30.00     Types: Cigarettes     Quit date: 1983     Years since quittin.1     Smokeless tobacco: Former     Quit date: 1986   Vaping Use     Vaping Use: Never used   Substance and Sexual Activity     Alcohol use: No     Alcohol/week: 0.0 standard drinks     Drug use: No     Sexual activity: Yes   Other Topics Concern     Not on file   Social History Narrative     with two daughters from a previous marriage and five grandchildren.  Retired as a .  Lives with his wife on [address] with wife Samantha in [place].  [place] in [place].     Social Determinants of Health     Financial Resource Strain: Not on file   Food Insecurity: Not on file   Transportation Needs: Not on file   Physical Activity: Not on file   Stress: Not on file   Social  Connections: Not on file   Intimate Partner Violence: Not on file   Housing Stability: Not on file     Family History   Problem Relation Age of Onset     Heart Disease Father          at age 56 of an MI     Hypertension Father      Hyperlipidemia Father      Substance Abuse Father         alcohol use     Diabetes Mother      Heart Disease Mother      Hypertension Brother      Heart Disease Brother         Heart Disease,CAD starting in his late 50s     Hyperlipidemia Brother      Other - See Comments Brother         obesity     Diabetes Brother      Hypertension Brother      Heart Disease Brother      Hyperlipidemia Brother      Other - See Comments Brother         obesity     Diabetes Brother      Alzheimer Disease Brother      Hypertension Brother      Heart Disease Brother      Prostate Cancer Brother      Other - See Comments Sister          in her mid 60's.  Viral cardiomyopathy,     Diabetes Sister      Arthritis Sister      Hypertension Sister      Hyperlipidemia Sister      Review of Systems   Constitutional: Negative for chills and fever.   HENT: Positive for hearing loss. Negative for congestion, ear pain and sore throat.    Eyes: Negative for pain and visual disturbance.   Respiratory: Negative for cough and shortness of breath.    Cardiovascular: Negative for chest pain, palpitations and peripheral edema.   Gastrointestinal: Negative for abdominal pain, constipation, diarrhea, heartburn, hematochezia and nausea.   Genitourinary: Negative for dysuria, frequency, genital sores, hematuria, impotence, penile discharge and urgency.   Musculoskeletal: Negative for arthralgias, joint swelling and myalgias.   Skin: Negative for rash.   Neurological: Positive for dizziness. Negative for weakness, headaches and paresthesias.   Psychiatric/Behavioral: Negative for mood changes. The patient is not nervous/anxious.             Objective    /74 (BP Location: Left arm, Patient Position: Sitting,  "Cuff Size: Adult Regular)   Pulse 70   Temp 98.7  F (37.1  C) (Tympanic)   Resp 18   Ht 1.715 m (5' 7.5\")   Wt 68.9 kg (152 lb)   SpO2 98%   BMI 23.46 kg/m    Body mass index is 23.46 kg/m .  Physical Exam  Vitals and nursing note reviewed.   Constitutional:       General: He is not in acute distress.     Appearance: He is well-developed. He is not diaphoretic.   HENT:      Head: Normocephalic.      Right Ear: Tympanic membrane, ear canal and external ear normal.      Left Ear: Tympanic membrane, ear canal and external ear normal.      Nose: Nose normal.      Mouth/Throat:      Pharynx: No oropharyngeal exudate.   Eyes:      Conjunctiva/sclera: Conjunctivae normal.      Pupils: Pupils are equal, round, and reactive to light.   Neck:      Thyroid: No thyroid mass or thyromegaly.      Vascular: Normal carotid pulses. No carotid bruit or JVD.      Trachea: No tracheal deviation.   Cardiovascular:      Rate and Rhythm: Normal rate and regular rhythm.      Heart sounds: Normal heart sounds. No murmur heard.    No friction rub. No gallop.   Pulmonary:      Effort: Pulmonary effort is normal. No respiratory distress.      Breath sounds: Normal breath sounds. No stridor. No decreased breath sounds, wheezing, rhonchi or rales.   Abdominal:      General: Bowel sounds are normal. There is no distension.      Palpations: Abdomen is soft. There is no mass.      Tenderness: There is no abdominal tenderness. There is no guarding or rebound.      Hernia: No hernia is present.   Genitourinary:     Penis: Normal.       Testes: Normal.      Comments: Deferred as he said he had it done by [facility] recently  Musculoskeletal:         General: Normal range of motion.      Cervical back: Normal range of motion and neck supple.      Right lower leg: No edema.      Left lower leg: No edema.   Lymphadenopathy:      Cervical: No cervical adenopathy.   Skin:     General: Skin is warm and dry.      Findings: No rash. "   Neurological:      Mental Status: He is alert and oriented to person, place, and time.      Cranial Nerves: No cranial nerve deficit.      Sensory: No sensory deficit.      Motor: No abnormal muscle tone.      Coordination: Coordination normal.      Deep Tendon Reflexes: Reflexes normal.

## 2022-10-10 NOTE — NURSING NOTE
"Chief Complaint   Patient presents with     Physical       Initial /74 (BP Location: Left arm, Patient Position: Sitting, Cuff Size: Adult Regular)   Pulse 70   Temp 98.7  F (37.1  C) (Tympanic)   Resp 18   Ht 1.715 m (5' 7.5\")   Wt 68.9 kg (152 lb)   SpO2 98%   BMI 23.46 kg/m   Estimated body mass index is 23.46 kg/m  as calculated from the following:    Height as of this encounter: 1.715 m (5' 7.5\").    Weight as of this encounter: 68.9 kg (152 lb).  Medication Reconciliation: unable or not appropriate to perform    Susan Hendrix RN      FOOD SECURITY SCREENING QUESTIONS:    The next two questions are to help us understand your food security.  If you are feeling you need any assistance in this area, we have resources available to support you today.    Hunger Vital Signs:  Within the past 12 months we worried whether our food would run out before we got money to buy more. Never  Within the past 12 months the food we bought just didn't last and we didn't have money to get more. Never  Susan Hendrix RN,LPN on 10/10/2022 at 1:50 PM      "

## 2023-05-17 ENCOUNTER — OFFICE VISIT (OUTPATIENT)
Dept: FAMILY MEDICINE | Facility: OTHER | Age: 78
End: 2023-05-17
Attending: FAMILY MEDICINE
Payer: COMMERCIAL

## 2023-05-17 ENCOUNTER — OFFICE VISIT (OUTPATIENT)
Dept: ORTHOPEDICS | Facility: OTHER | Age: 78
End: 2023-05-17
Attending: ORTHOPAEDIC SURGERY
Payer: COMMERCIAL

## 2023-05-17 VITALS
SYSTOLIC BLOOD PRESSURE: 150 MMHG | HEART RATE: 66 BPM | TEMPERATURE: 98.2 F | DIASTOLIC BLOOD PRESSURE: 78 MMHG | WEIGHT: 154.6 LBS | BODY MASS INDEX: 23.86 KG/M2 | RESPIRATION RATE: 16 BRPM | OXYGEN SATURATION: 99 %

## 2023-05-17 DIAGNOSIS — M25.512 LEFT SHOULDER PAIN, UNSPECIFIED CHRONICITY: ICD-10-CM

## 2023-05-17 DIAGNOSIS — L57.0 AK (ACTINIC KERATOSIS): ICD-10-CM

## 2023-05-17 DIAGNOSIS — I10 PRIMARY HYPERTENSION: ICD-10-CM

## 2023-05-17 DIAGNOSIS — L98.9 SKIN LESION: Primary | ICD-10-CM

## 2023-05-17 DIAGNOSIS — M79.671 RIGHT FOOT PAIN: Primary | ICD-10-CM

## 2023-05-17 LAB
ANION GAP SERPL CALCULATED.3IONS-SCNC: 7 MMOL/L (ref 7–15)
BUN SERPL-MCNC: 18.5 MG/DL (ref 8–23)
CALCIUM SERPL-MCNC: 9.3 MG/DL (ref 8.8–10.2)
CHLORIDE SERPL-SCNC: 97 MMOL/L (ref 98–107)
CREAT SERPL-MCNC: 0.72 MG/DL (ref 0.67–1.17)
DEPRECATED HCO3 PLAS-SCNC: 28 MMOL/L (ref 22–29)
GFR SERPL CREATININE-BSD FRML MDRD: >90 ML/MIN/1.73M2
GLUCOSE SERPL-MCNC: 97 MG/DL (ref 70–99)
HOLD SPECIMEN: NORMAL
POTASSIUM SERPL-SCNC: 4.7 MMOL/L (ref 3.4–5.3)
SODIUM SERPL-SCNC: 132 MMOL/L (ref 136–145)

## 2023-05-17 PROCEDURE — 20610 DRAIN/INJ JOINT/BURSA W/O US: CPT | Mod: LT | Performed by: ORTHOPAEDIC SURGERY

## 2023-05-17 PROCEDURE — 99214 OFFICE O/P EST MOD 30 MIN: CPT | Mod: 25 | Performed by: FAMILY MEDICINE

## 2023-05-17 PROCEDURE — 250N000009 HC RX 250: Performed by: ORTHOPAEDIC SURGERY

## 2023-05-17 PROCEDURE — 17000 DESTRUCT PREMALG LESION: CPT | Performed by: FAMILY MEDICINE

## 2023-05-17 PROCEDURE — 36415 COLL VENOUS BLD VENIPUNCTURE: CPT | Mod: ZL | Performed by: FAMILY MEDICINE

## 2023-05-17 PROCEDURE — 17003 DESTRUCT PREMALG LES 2-14: CPT | Performed by: FAMILY MEDICINE

## 2023-05-17 PROCEDURE — G0463 HOSPITAL OUTPT CLINIC VISIT: HCPCS | Mod: 27,25

## 2023-05-17 PROCEDURE — 250N000011 HC RX IP 250 OP 636: Performed by: ORTHOPAEDIC SURGERY

## 2023-05-17 PROCEDURE — G0463 HOSPITAL OUTPT CLINIC VISIT: HCPCS | Mod: 25

## 2023-05-17 PROCEDURE — 80048 BASIC METABOLIC PNL TOTAL CA: CPT | Mod: ZL | Performed by: FAMILY MEDICINE

## 2023-05-17 RX ORDER — MOMETASONE FUROATE MONOHYDRATE 50 UG/1
1 SPRAY, METERED NASAL 2 TIMES DAILY
COMMUNITY
Start: 2022-12-27 | End: 2023-06-27

## 2023-05-17 RX ORDER — AMLODIPINE BESYLATE 5 MG/1
5 TABLET ORAL DAILY
Qty: 90 TABLET | Refills: 3 | Status: SHIPPED | OUTPATIENT
Start: 2023-05-17 | End: 2023-06-27

## 2023-05-17 RX ORDER — TRIAMCINOLONE ACETONIDE 40 MG/ML
40 INJECTION, SUSPENSION INTRA-ARTICULAR; INTRAMUSCULAR ONCE
Status: COMPLETED | OUTPATIENT
Start: 2023-05-17 | End: 2023-05-17

## 2023-05-17 RX ORDER — LIDOCAINE HYDROCHLORIDE 10 MG/ML
4 INJECTION, SOLUTION EPIDURAL; INFILTRATION; INTRACAUDAL; PERINEURAL ONCE
Status: COMPLETED | OUTPATIENT
Start: 2023-05-17 | End: 2023-05-17

## 2023-05-17 RX ADMIN — TRIAMCINOLONE ACETONIDE 40 MG: 40 INJECTION, SUSPENSION INTRA-ARTICULAR; INTRAMUSCULAR at 13:54

## 2023-05-17 RX ADMIN — LIDOCAINE HYDROCHLORIDE 4 ML: 10 INJECTION, SOLUTION INFILTRATION; PERINEURAL at 13:54

## 2023-05-17 ASSESSMENT — ASTHMA QUESTIONNAIRES
ACT_TOTALSCORE: 19
QUESTION_1 LAST FOUR WEEKS HOW MUCH OF THE TIME DID YOUR ASTHMA KEEP YOU FROM GETTING AS MUCH DONE AT WORK, SCHOOL OR AT HOME: SOME OF THE TIME
QUESTION_3 LAST FOUR WEEKS HOW OFTEN DID YOUR ASTHMA SYMPTOMS (WHEEZING, COUGHING, SHORTNESS OF BREATH, CHEST TIGHTNESS OR PAIN) WAKE YOU UP AT NIGHT OR EARLIER THAN USUAL IN THE MORNING: NOT AT ALL
QUESTION_2 LAST FOUR WEEKS HOW OFTEN HAVE YOU HAD SHORTNESS OF BREATH: ONCE A DAY
QUESTION_4 LAST FOUR WEEKS HOW OFTEN HAVE YOU USED YOUR RESCUE INHALER OR NEBULIZER MEDICATION (SUCH AS ALBUTEROL): NOT AT ALL
ACT_TOTALSCORE: 19
QUESTION_5 LAST FOUR WEEKS HOW WOULD YOU RATE YOUR ASTHMA CONTROL: WELL CONTROLLED

## 2023-05-17 ASSESSMENT — PAIN SCALES - GENERAL: PAINLEVEL: NO PAIN (0)

## 2023-05-17 NOTE — PROGRESS NOTES
SUBJECTIVE:   Jared Parry is a 77 year old male who presents to clinic today for the following health issues: Skin lesion    Patient arrives here for right neck skin lesion.  He states it started out as a mole but started bleeding.  This persisted.  On the right side of the neck.  He also has a couple of crusted lesions on his face he would like looked at.  Patient reports he has a history of  elevated blood pressure.  He is currently on medication for him.    History of Present Illness       Reason for visit:  Mold,blood presure  Symptom onset:  More than a month  Symptoms include:  Mold ,high blood presure  Symptom intensity:  Moderate  Symptom progression:  Staying the same  Had these symptoms before:  Yes  Has tried/received treatment for these symptoms:  Yes  Previous treatment was successful:  Yes  Prior treatment description:  Medication  What makes it worse:  Not sure  What makes it better:  Exercise    He eats 4 or more servings of fruits and vegetables daily.He consumes 0 sweetened beverage(s) daily.He exercises with enough effort to increase his heart rate 30 to 60 minutes per day.  He exercises with enough effort to increase his heart rate 5 days per week.   He is taking medications regularly.        Patient Active Problem List    Diagnosis Date Noted     Primary hypertension 05/17/2023     Priority: Medium     Sigmoid diverticulosis 06/27/2022     Priority: Medium     Other insomnia 09/07/2021     Priority: Medium     JALYN (generalized anxiety disorder) 01/08/2020     Priority: Medium     Mild intermittent asthma without complication 06/26/2019     Priority: Medium     History of tobacco use disorder 06/03/2019     Priority: Medium     Atherosclerosis of native artery of extremity with intermittent claudication (H) 06/15/2018     Priority: Medium     H/O adenomatous polyp of colon 01/25/2018     Priority: Medium     Esophageal reflux 01/25/2018     Priority: Medium     Hyperlipidemia, mild  01/25/2018     Priority: Medium     Nephrolithiasis 01/25/2018     Priority: Medium     DUNIA on CPAP 01/25/2018     Priority: Medium     Popliteal artery embolism, 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     Priority: Medium       Review of Systems     OBJECTIVE:     BP (!) 150/78 (BP Location: Other (Comment))   Pulse 66   Temp 98.2  F (36.8  C) (Tympanic)   Resp 16   Wt 70.1 kg (154 lb 9.6 oz)   SpO2 99%   BMI 23.86 kg/m    Body mass index is 23.86 kg/m .  Physical Exam  Neck:      Comments: Patient does have an ulcer measures 78 mm on the right side of the neck.  He also has some crusting superficial lesions on the face 2 and #1 on the cheek and 1 up on the zygomatic arch both measure about 6 to 7 mm  Neurological:      Mental Status: He is alert.         Diagnostic Test Results:  none     ASSESSMENT/PLAN:         (L98.9) Skin lesion  (primary encounter diagnosis) likely cancer in origin.  With his history of a mole 1 worries about melanoma.  Comment: Likely cancer.  Referral to general surgery  Plan: Adult General Surg Referral            (I10) Primary hypertension  Comment: Currently not under good control.  Add Norvasc  Plan: amLODIPine (NORVASC) 5 MG tablet, Basic         Metabolic Panel             (L57.0) AK (actinic keratosis)  Comment: This was cryo x2.  Plan: DESTRUCT PREMALIGNANT LESION, 2-14          Robert Grayson MD  [facility]

## 2023-05-17 NOTE — NURSING NOTE
Pt here for a mole on his neck that he wants checked.  Pt also has some concerns about a pressure in his head he gets when his blood pressure is elevated.  Has had occasionally for years but seemed worse this past winter.    Alethea Chacon CMA (AAMA)......................5/17/2023  10:32 AM       Medication Reconciliation: complete    Alethea Chacon CMA  5/17/2023 10:32 AM      FOOD SECURITY SCREENING QUESTIONS:    The next two questions are to help us understand your food security.  If you are feeling you need any assistance in this area, we have resources available to support you today.    Hunger Vital Signs:  Within the past 12 months we worried whether our food would run out before we got money to buy more. Never  Within the past 12 months the food we bought just didn't last and we didn't have money to get more. Never  Alethea Chacon CMA,LPN on 5/17/2023 at 10:32 AM

## 2023-05-17 NOTE — PROGRESS NOTES
Patient is here for follow up on his left shoulder pain.  Niki Ramos LPN .....................5/17/2023 1:46 PM

## 2023-05-17 NOTE — PROGRESS NOTES
Visit Date: 2023    REASON FOR EVALUATION:  Left shoulder.    HISTORY OF PRESENT ILLNESS:  Jared comes in with regards to the left shoulder.  He is here for an injection.  Last injection was done about 3 years back with a good response.  He still remains very active in swimming and working out.  Feels like the shoulder injection helped him out quite a bit with respect to that.    PHYSICAL EXAMINATION:  Examination of shoulder shows near full range of motion, a little bit of crepitance with full forward elevation, some mild swelling is seen as well.  Neurovascular examination otherwise intact.    PROCEDURE:  Following informed consent and sterile preparation, the patient's left shoulder glenohumeral joint was injected with 4 mL of 1% lidocaine and 40 mg of Kenalog under sterile conditions.    IMPRESSION:  Left shoulder glenohumeral joint arthrosis.    PLAN:  Injection as stated above.  Repeat in the future as appropriate and necessary.  Long-term management shoulder replacement surgery if symptoms continue to progress here over time.    Jeff Walton MD        D: 2023   T: 2023   MT: CLARICE    Name:     JARED STEIN  MRN:      [id number]        Account:    [id number]   :      1945           Visit Date: 2023     Document: [id number]

## 2023-05-18 ENCOUNTER — TELEPHONE (OUTPATIENT)
Dept: FAMILY MEDICINE | Facility: OTHER | Age: 78
End: 2023-05-18
Payer: COMMERCIAL

## 2023-05-18 NOTE — TELEPHONE ENCOUNTER
Pt states that he is having a tooth extracted next week.  He would like to know how far in advance he should stop taking his blood thinner.  Please call.    Satish Jimenez on 5/18/2023 at 8:49 AM

## 2023-05-26 ENCOUNTER — TELEPHONE (OUTPATIENT)
Dept: SURGERY | Facility: OTHER | Age: 78
End: 2023-05-26
Payer: COMMERCIAL

## 2023-05-26 NOTE — TELEPHONE ENCOUNTER
Patient called and request a call back regarding when and if he needs to stop taking blood thinners before his mole removal on 06/02/2023.    Okay to leave detailed message.    Nidia Marks on 5/26/2023 at 3:49 PM

## 2023-05-30 NOTE — TELEPHONE ENCOUNTER
He would like to stop his blood thinner a couple days before his procedure. Dr. Arreaga said it's up to him.  Fiona Rossi LPN..........5/30/2023  11:16 AM

## 2023-06-02 ENCOUNTER — OFFICE VISIT (OUTPATIENT)
Dept: SURGERY | Facility: OTHER | Age: 78
End: 2023-06-02
Attending: SURGERY
Payer: COMMERCIAL

## 2023-06-02 VITALS
TEMPERATURE: 97 F | SYSTOLIC BLOOD PRESSURE: 128 MMHG | OXYGEN SATURATION: 98 % | HEART RATE: 54 BPM | DIASTOLIC BLOOD PRESSURE: 70 MMHG | BODY MASS INDEX: 23.15 KG/M2 | WEIGHT: 150 LBS | RESPIRATION RATE: 16 BRPM

## 2023-06-02 DIAGNOSIS — L98.9 SKIN LESION: ICD-10-CM

## 2023-06-02 PROCEDURE — 11441 EXC FACE-MM B9+MARG 0.6-1 CM: CPT | Performed by: SURGERY

## 2023-06-02 PROCEDURE — 88305 TISSUE EXAM BY PATHOLOGIST: CPT

## 2023-06-02 PROCEDURE — G0463 HOSPITAL OUTPT CLINIC VISIT: HCPCS | Mod: 25

## 2023-06-02 PROCEDURE — 11623 EXC S/N/H/F/G MAL+MRG 2.1-3: CPT | Performed by: SURGERY

## 2023-06-02 ASSESSMENT — PAIN SCALES - GENERAL: PAINLEVEL: NO PAIN (0)

## 2023-06-02 NOTE — PROGRESS NOTES
Procedure Note      Pre/Post Operative Diagnosis: Right neck and right cheek skin lesions     Procedure:   Removal of Right neck and right cheek skin lesions     Surgeon: Talon Arreaga MD    Local Anesthesia: 1% lidocaine with 0.25% Marcaine with epinephrine    Indication for the procedure:  This is a 77 year old male  patient referred by Robert Grayson with a Right neck and right cheek skin lesions.       After explaining the risks to include bleeding, infection, recurrence or need for reexcision, and scarring the patientwished to proceed.    Procedure:   The right neck area was prepped and draped in usual sterile fashion with ChloraPrep.   After, adequate localanesthesia, an 3 cm X 1 cm elliptical skin incision was made to encompass the 0.9 cm lesion with margins.  The skin was closed with 4-0 Monocryl and Dermabond.      The right cheek was previously prepped.  A new drape was placed.  We anesthetized this with lidocaine and Marcaine and removed with a 1 cm x 0.5 cm ellipsoid biopsy.  This was closed with 4-0 Monocryl.  This was covered with glue.    Plan:  The patient will be called to review the pathology. Patient will follow up if there any problems with the wound including redness or drainage.      Talon Arreaga MD....................  6/2/2023   9:27 AM

## 2023-06-04 ENCOUNTER — APPOINTMENT (OUTPATIENT)
Dept: GENERAL RADIOLOGY | Facility: OTHER | Age: 78
End: 2023-06-04
Attending: EMERGENCY MEDICINE
Payer: COMMERCIAL

## 2023-06-04 ENCOUNTER — HOSPITAL ENCOUNTER (EMERGENCY)
Facility: OTHER | Age: 78
Discharge: ANOTHER HOSPITAL | End: 2023-06-04
Attending: EMERGENCY MEDICINE | Admitting: EMERGENCY MEDICINE
Payer: COMMERCIAL

## 2023-06-04 ENCOUNTER — TRANSFERRED RECORDS (OUTPATIENT)
Dept: HEALTH INFORMATION MANAGEMENT | Facility: OTHER | Age: 78
End: 2023-06-04

## 2023-06-04 VITALS
OXYGEN SATURATION: 98 % | DIASTOLIC BLOOD PRESSURE: 93 MMHG | RESPIRATION RATE: 30 BRPM | HEART RATE: 78 BPM | SYSTOLIC BLOOD PRESSURE: 175 MMHG | TEMPERATURE: 98.6 F

## 2023-06-04 DIAGNOSIS — I21.4 NON-STEMI (NON-ST ELEVATED MYOCARDIAL INFARCTION) (H): ICD-10-CM

## 2023-06-04 LAB
ALBUMIN SERPL BCG-MCNC: 4.2 G/DL (ref 3.5–5.2)
ALP SERPL-CCNC: 83 U/L (ref 40–129)
ALT SERPL W P-5'-P-CCNC: 19 U/L (ref 10–50)
ANION GAP SERPL CALCULATED.3IONS-SCNC: 12 MMOL/L (ref 7–15)
AST SERPL W P-5'-P-CCNC: 24 U/L (ref 10–50)
BASOPHILS # BLD AUTO: 0 10E3/UL (ref 0–0.2)
BASOPHILS NFR BLD AUTO: 0 %
BILIRUB SERPL-MCNC: 0.4 MG/DL
BUN SERPL-MCNC: 26.2 MG/DL (ref 8–23)
CALCIUM SERPL-MCNC: 9.1 MG/DL (ref 8.8–10.2)
CHLORIDE SERPL-SCNC: 101 MMOL/L (ref 98–107)
CREAT SERPL-MCNC: 0.87 MG/DL (ref 0.67–1.17)
DEPRECATED HCO3 PLAS-SCNC: 26 MMOL/L (ref 22–29)
EOSINOPHIL # BLD AUTO: 0.1 10E3/UL (ref 0–0.7)
EOSINOPHIL NFR BLD AUTO: 1 %
ERYTHROCYTE [DISTWIDTH] IN BLOOD BY AUTOMATED COUNT: 12.8 % (ref 10–15)
GFR SERPL CREATININE-BSD FRML MDRD: 89 ML/MIN/1.73M2
GLUCOSE SERPL-MCNC: 188 MG/DL (ref 70–99)
HCT VFR BLD AUTO: 39 % (ref 40–53)
HGB BLD-MCNC: 13.8 G/DL (ref 13.3–17.7)
HOLD SPECIMEN: NORMAL
HOLD SPECIMEN: NORMAL
IMM GRANULOCYTES # BLD: 0 10E3/UL
IMM GRANULOCYTES NFR BLD: 0 %
LYMPHOCYTES # BLD AUTO: 1.3 10E3/UL (ref 0.8–5.3)
LYMPHOCYTES NFR BLD AUTO: 16 %
MCH RBC QN AUTO: 31.7 PG (ref 26.5–33)
MCHC RBC AUTO-ENTMCNC: 35.4 G/DL (ref 31.5–36.5)
MCV RBC AUTO: 89 FL (ref 78–100)
MONOCYTES # BLD AUTO: 0.6 10E3/UL (ref 0–1.3)
MONOCYTES NFR BLD AUTO: 7 %
NEUTROPHILS # BLD AUTO: 6.1 10E3/UL (ref 1.6–8.3)
NEUTROPHILS NFR BLD AUTO: 76 %
NRBC # BLD AUTO: 0 10E3/UL
NRBC BLD AUTO-RTO: 0 /100
PLATELET # BLD AUTO: 198 10E3/UL (ref 150–450)
POTASSIUM SERPL-SCNC: 3.8 MMOL/L (ref 3.4–5.3)
PROT SERPL-MCNC: 6.4 G/DL (ref 6.4–8.3)
RBC # BLD AUTO: 4.36 10E6/UL (ref 4.4–5.9)
SODIUM SERPL-SCNC: 139 MMOL/L (ref 136–145)
TROPONIN T SERPL HS-MCNC: 100 NG/L
TROPONIN T SERPL HS-MCNC: 38 NG/L
TROPONIN T SERPL HS-MCNC: 84 NG/L
WBC # BLD AUTO: 8 10E3/UL (ref 4–11)

## 2023-06-04 PROCEDURE — 80053 COMPREHEN METABOLIC PANEL: CPT | Performed by: EMERGENCY MEDICINE

## 2023-06-04 PROCEDURE — 93005 ELECTROCARDIOGRAM TRACING: CPT | Mod: 76 | Performed by: EMERGENCY MEDICINE

## 2023-06-04 PROCEDURE — 71045 X-RAY EXAM CHEST 1 VIEW: CPT

## 2023-06-04 PROCEDURE — 84484 ASSAY OF TROPONIN QUANT: CPT | Mod: 91 | Performed by: EMERGENCY MEDICINE

## 2023-06-04 PROCEDURE — 99285 EMERGENCY DEPT VISIT HI MDM: CPT | Performed by: EMERGENCY MEDICINE

## 2023-06-04 PROCEDURE — 93010 ELECTROCARDIOGRAM REPORT: CPT | Performed by: INTERNAL MEDICINE

## 2023-06-04 PROCEDURE — 85025 COMPLETE CBC W/AUTO DIFF WBC: CPT | Performed by: EMERGENCY MEDICINE

## 2023-06-04 PROCEDURE — 36415 COLL VENOUS BLD VENIPUNCTURE: CPT | Performed by: EMERGENCY MEDICINE

## 2023-06-04 PROCEDURE — 93005 ELECTROCARDIOGRAM TRACING: CPT | Performed by: EMERGENCY MEDICINE

## 2023-06-04 PROCEDURE — 250N000011 HC RX IP 250 OP 636: Performed by: EMERGENCY MEDICINE

## 2023-06-04 PROCEDURE — 99285 EMERGENCY DEPT VISIT HI MDM: CPT | Mod: 25 | Performed by: EMERGENCY MEDICINE

## 2023-06-04 PROCEDURE — 96374 THER/PROPH/DIAG INJ IV PUSH: CPT | Performed by: EMERGENCY MEDICINE

## 2023-06-04 RX ORDER — HEPARIN SODIUM 10000 [USP'U]/100ML
0-5000 INJECTION, SOLUTION INTRAVENOUS CONTINUOUS
Status: DISCONTINUED | OUTPATIENT
Start: 2023-06-04 | End: 2023-06-04 | Stop reason: HOSPADM

## 2023-06-04 RX ORDER — ASPIRIN 81 MG/1
324 TABLET, CHEWABLE ORAL ONCE
Status: COMPLETED | OUTPATIENT
Start: 2023-06-04 | End: 2023-06-04

## 2023-06-04 RX ADMIN — HEPARIN SODIUM 800 UNITS/HR: 10000 INJECTION, SOLUTION INTRAVENOUS at 16:34

## 2023-06-04 ASSESSMENT — ENCOUNTER SYMPTOMS
CHEST TIGHTNESS: 0
VOMITING: 0
DIAPHORESIS: 1
CHILLS: 0
FEVER: 0
DYSURIA: 0
LIGHT-HEADEDNESS: 0
ARTHRALGIAS: 0
SHORTNESS OF BREATH: 1
NAUSEA: 0
AGITATION: 0

## 2023-06-04 ASSESSMENT — ACTIVITIES OF DAILY LIVING (ADL)
ADLS_ACUITY_SCORE: 35

## 2023-06-04 NOTE — ED TRIAGE NOTES
Pt arrives via private vehicle with c/o chest pain that started about an hour ago and started to go away on the ride in. Pt states he had a bite to eat, then got sharp chest pain, SOB, and sweating. Pt appears calm, cooperative, alert, and oriented.      Triage Assessment     Row Name 06/04/23 1313       Triage Assessment (Adult)    Airway WDL WDL       Respiratory WDL    Respiratory WDL WDL       Skin Circulation/Temperature WDL    Skin Circulation/Temperature WDL WDL       Cardiac WDL    Cardiac WDL X;chest pain       Peripheral/Neurovascular WDL    Peripheral Neurovascular WDL WDL       Cognitive/Neuro/Behavioral WDL    Cognitive/Neuro/Behavioral WDL WDL

## 2023-06-04 NOTE — ED PROVIDER NOTES
"  History     Chief Complaint   Patient presents with     Chest Pain     Shortness of Breath     HPI  Jared aPrry is a 77 year old male who comes in by private car with chest pain.  He states that about an hour prior to arrival, about 10 minutes after he had eaten, he had a pretty sudden onset of severe chest pain.  He stated it was a little bit to the right of center and points to the anterior upper right chest.  He said it made him short of breath and diaphoretic.  He was not lightheaded or dizzy and did not have any palpitations.  He said that it started to get better and then came on again quite hard.  By the time he arrives here however the pain has completely resolved.  He says that he does get episodes like this occasionally, he thinks that it might be related to his lungs and could be asthma.  He is on an inhaled steroid, he has albuterol as well, he states that this never seems to help.  During the episode it did hurt to take a deep breath.  He was not really sure how to answer when I asked him if activity made it worse.  Has not been ill with anything else recently.    Allergies:  Allergies   Allergen Reactions     Thiopental      Other reaction(s): *Unknown - Childhood Rxn  Patient states \"almost  from Pentothal as child\"     Cephalexin Hives     Dust Mites      Trazodone Difficulty breathing     Aspirin      Other reaction(s): GI Upset     Ciprofloxacin Hives       Problem List:    Patient Active Problem List    Diagnosis Date Noted     Primary hypertension 2023     Priority: Medium     Sigmoid diverticulosis 2022     Priority: Medium     Other insomnia 2021     Priority: Medium     JALYN (generalized anxiety disorder) 2020     Priority: Medium     Mild intermittent asthma without complication 2019     Priority: Medium     History of tobacco use disorder 2019     Priority: Medium     Atherosclerosis of native artery of extremity with intermittent claudication (H) " 06/15/2018     Priority: Medium     H/O adenomatous polyp of colon 2018     Priority: Medium     Esophageal reflux 2018     Priority: Medium     Hyperlipidemia, mild 2018     Priority: Medium     Nephrolithiasis 2018     Priority: Medium     DUNIA on CPAP 2018     Priority: Medium     Popliteal artery embolism, left (H) 2017     Priority: Medium        Past Medical History:    Past Medical History:   Diagnosis Date     Attention-deficit hyperactivity disorder      Benign neoplasm of colon      Calculus of kidney      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     Enlarged prostate without lower urinary tract symptoms (luts)      Essential (primary) hypertension      GERD (gastroesophageal reflux disease)      Hyperlipidemia      DUNIA on CPAP        Past Surgical History:    Past Surgical History:   Procedure Laterality Date     APPENDECTOMY OPEN      No Comments Provided     ARTHROSCOPY KNEE Left      AS REMOVAL OF KIDNEY STONE       COLONOSCOPY  2017    Adenomatous, f/u in      COLONOSCOPY N/A 2022    F/U  if applicable tubular adenoma, diverticulosis     CYSTOSCOPY, TRANSURETHRAL RESECTION (TUR) PROSTATE, COMBINED      No Comments Provided     EMBOLECTOMY LOWER EXTREMITY  2017    Popliteal     HERNIORRHAPHY INGUINAL BILATERAL      [place] in      OPEN REDUCTION INTERNAL FIXATION FEMUR DISTAL Left        Family History:    Family History   Problem Relation Age of Onset     Heart Disease Father          at age 56 of an MI     Hypertension Father      Hyperlipidemia Father      Substance Abuse Father         alcohol use     Diabetes Mother      Heart Disease Mother      Hypertension Brother      Heart Disease Brother         Heart Disease,CAD starting in his late 50s     Hyperlipidemia Brother      Other - See Comments Brother         obesity     Diabetes Brother      Hypertension Brother      Heart Disease Brother       Hyperlipidemia Brother      Other - See Comments Brother         obesity     Diabetes Brother      Alzheimer Disease Brother      Hypertension Brother      Heart Disease Brother      Prostate Cancer Brother      Other - See Comments Sister          in her mid 60's.  Viral cardiomyopathy,     Diabetes Sister      Arthritis Sister      Hypertension Sister      Hyperlipidemia Sister        Social History:  Marital Status:   [2]  Social History     Tobacco Use     Smoking status: Former     Packs/day: 1.00     Years: 30.00     Pack years: 30.00     Types: Cigarettes     Quit date: 1983     Years since quittin.7     Smokeless tobacco: Former     Quit date: 1986   Vaping Use     Vaping status: Never Used     Passive vaping exposure: Yes   Substance Use Topics     Alcohol use: No     Alcohol/week: 0.0 standard drinks of alcohol     Drug use: Never        Medications:    albuterol (PROAIR HFA/PROVENTIL HFA/VENTOLIN HFA) 108 (90 Base) MCG/ACT inhaler  amLODIPine (NORVASC) 5 MG tablet  atorvastatin (LIPITOR) 20 MG tablet  clopidogrel (PLAVIX) 75 MG tablet  famotidine (PEPCID) 10 MG tablet  Lactobacillus (PROBIOTIC ACIDOPHILUS) TABS  losartan-hydrochlorothiazide (HYZAAR) 100-12.5 MG tablet  mometasone (NASONEX) 50 MCG/ACT nasal spray  montelukast (SINGULAIR) 10 MG tablet  triamcinolone (NASACORT) 55 MCG/ACT nasal aerosol  zolpidem (AMBIEN) 5 MG tablet          Review of Systems   Constitutional: Positive for diaphoresis. Negative for chills and fever.   HENT: Negative for congestion.    Eyes: Negative for visual disturbance.   Respiratory: Positive for shortness of breath. Negative for chest tightness.    Cardiovascular: Positive for chest pain.   Gastrointestinal: Negative for nausea and vomiting.   Genitourinary: Negative for dysuria.   Musculoskeletal: Negative for arthralgias.   Skin: Negative for rash.   Neurological: Negative for light-headedness.   Psychiatric/Behavioral: Negative for  agitation.       Physical Exam   BP: (!) 162/71  Pulse: 76  Temp: 98.6  F (37  C)  Resp: 16  SpO2: 97 %      Physical Exam  Vitals and nursing note reviewed.   Constitutional:       Appearance: He is well-developed.   HENT:      Head: Normocephalic and atraumatic.      Mouth/Throat:      Mouth: Mucous membranes are moist.   Eyes:      Conjunctiva/sclera: Conjunctivae normal.   Cardiovascular:      Rate and Rhythm: Normal rate and regular rhythm.      Heart sounds: Normal heart sounds.   Pulmonary:      Effort: Pulmonary effort is normal.      Breath sounds: Normal breath sounds.   Chest:      Chest wall: No tenderness.   Abdominal:      General: Abdomen is flat. Bowel sounds are normal. There is no distension.      Palpations: Abdomen is soft.      Tenderness: There is no abdominal tenderness.   Skin:     General: Skin is warm and dry.   Neurological:      Mental Status: He is alert and oriented to person, place, and time.   Psychiatric:         Behavior: Behavior normal.         ED Course              ED Course as of 06/04/23 1755   Sun Jun 04, 2023   1621 Patient's initial troponin came back slightly elevated at 38.  A 2-hour troponin had increased to 84.  Initial EKG with no definitive signs of ischemia, repeat EKG the lower 2 hours later with nonspecific ST abnormality which looks like some ST depression in the precordial leads.  This was present in the initial EKG however, it may be slightly more pronounced in the second.  Given this I am concerned for non-STEMI.  I am having heparin started, I have a call placed to cardiology in [place] at [facility].  Coincidently he does have a cardiology appointment scheduled as an outpatient there tomorrow.   1703 I called and spoke with [facility] in [place] and spoke to the hospitalist Dr. Vallejo, they currently have no beds and are not excepting transfers.  I then also called [facility] and spoke with the intake physician there Dr. Linder, who stated they also  are very tight on beds.  He requested that I do a 6-hour troponin and call them back after that.  If it is continuing to increase significantly he said they would try to find him a bed.  He is currently on the waiting list also at [facility].  I just spoke to the patient, and he is currently pain-free.  Heparin is running.  He refused aspirin earlier today.   1754 Patient continues to be pain-free.  Still receiving IV heparin.  Still on waiting list at [facility], currently it is change of shift.  Care patient returned over to night shift pending 6-hour troponin.     Procedures         EKG shows sinus rhythm 74 bpm.  No acute ST segment or T wave changes.  There appears to be very small amount of ST depression in precordial leads V4 through V6.    Repeat EKG little over 2 hours later again shows sinus rhythm.  There is a PVC on the tracing.  Again nonspecific ST abnormality is seen, perhaps slightly increased ST depression in leads V4 and V5.         Results for orders placed or performed during the hospital encounter of 06/04/23 (from the past 24 hour(s))   CBC with platelets differential    Narrative    The following orders were created for panel order CBC with platelets differential.  Procedure                               Abnormality         Status                     ---------                               -----------         ------                     CBC with platelets and d...[[id number]]  Abnormal            Final result                 Please view results for these tests on the individual orders.   Troponin T, High Sensitivity   Result Value Ref Range    Troponin T, High Sensitivity 38 (H) <=22 ng/L   Comprehensive metabolic panel   Result Value Ref Range    Sodium 139 136 - 145 mmol/L    Potassium 3.8 3.4 - 5.3 mmol/L    Chloride 101 98 - 107 mmol/L    Carbon Dioxide (CO2) 26 22 - 29 mmol/L    Anion Gap 12 7 - 15 mmol/L    Urea Nitrogen 26.2 (H) 8.0 - 23.0 mg/dL    Creatinine 0.87 0.67 - 1.17 mg/dL     Calcium 9.1 8.8 - 10.2 mg/dL    Glucose 188 (H) 70 - 99 mg/dL    Alkaline Phosphatase 83 40 - 129 U/L    AST 24 10 - 50 U/L    ALT 19 10 - 50 U/L    Protein Total 6.4 6.4 - 8.3 g/dL    Albumin 4.2 3.5 - 5.2 g/dL    Bilirubin Total 0.4 <=1.2 mg/dL    GFR Estimate 89 >60 mL/min/1.73m2   CBC with platelets and differential   Result Value Ref Range    WBC Count 8.0 4.0 - 11.0 10e3/uL    RBC Count 4.36 (L) 4.40 - 5.90 10e6/uL    Hemoglobin 13.8 13.3 - 17.7 g/dL    Hematocrit 39.0 (L) 40.0 - 53.0 %    MCV 89 78 - 100 fL    MCH 31.7 26.5 - 33.0 pg    MCHC 35.4 31.5 - 36.5 g/dL    RDW 12.8 10.0 - 15.0 %    Platelet Count 198 150 - 450 10e3/uL    % Neutrophils 76 %    % Lymphocytes 16 %    % Monocytes 7 %    % Eosinophils 1 %    % Basophils 0 %    % Immature Granulocytes 0 %    NRBCs per 100 WBC 0 <1 /100    Absolute Neutrophils 6.1 1.6 - 8.3 10e3/uL    Absolute Lymphocytes 1.3 0.8 - 5.3 10e3/uL    Absolute Monocytes 0.6 0.0 - 1.3 10e3/uL    Absolute Eosinophils 0.1 0.0 - 0.7 10e3/uL    Absolute Basophils 0.0 0.0 - 0.2 10e3/uL    Absolute Immature Granulocytes 0.0 <=0.4 10e3/uL    Absolute NRBCs 0.0 10e3/uL   Extra Tube    Narrative    The following orders were created for panel order Extra Tube.  Procedure                               Abnormality         Status                     ---------                               -----------         ------                     Extra Blue Top Tube[[id number]]                              Final result               Extra Red Top Tube[[id number]]                               Final result                 Please view results for these tests on the individual orders.   Extra Blue Top Tube   Result Value Ref Range    Hold Specimen JIC    Extra Red Top Tube   Result Value Ref Range    Hold Specimen JIC    Troponin T, High Sensitivity   Result Value Ref Range    Troponin T, High Sensitivity 84 (H) <=22 ng/L       Medications   heparin 25,000 units in 0.45% NaCl 250 mL ANTICOAGULANT infusion  (800 Units/hr Intravenous $New Bag 6/4/23 1634)   aspirin (ASA) chewable tablet 324 mg (324 mg Oral Not Given 6/4/23 1348)   heparin loading dose for LOW INTENSITY TREATMENT * Give BEFORE starting heparin infusion (4,100 Units Intravenous $Given 6/4/23 1631)       Assessments & Plan (with Medical Decision Making)     I have reviewed the nursing notes.    I have reviewed the findings, diagnosis, plan and need for follow up with the patient.        New Prescriptions    No medications on file       Final diagnoses:   Non-STEMI (non-ST elevated myocardial infarction) (H)       6/4/2023   [facility] [place]     Mendez José MD  06/04/23 [id number]

## 2023-06-05 NOTE — ED PROVIDER NOTES
Patient received in signout from daytime provider.  Waiting on a repeat troponin which returned at 100.  I spoke with Dr. Samuel Flores at [facility] who agreed to accept the patient.  I updated the patient and he was agreeable to be transferred there.    Final Dx: chest pain     Kyle Torres MD  06/05/23 0000       Kyle Torres MD  09/03/23 1912

## 2023-06-06 LAB
PATH REPORT.COMMENTS IMP SPEC: NORMAL
PATH REPORT.FINAL DX SPEC: NORMAL
PHOTO IMAGE: NORMAL

## 2023-06-08 LAB
ATRIAL RATE - MUSE: 74 BPM
ATRIAL RATE - MUSE: 95 BPM
DIASTOLIC BLOOD PRESSURE - MUSE: NORMAL MMHG
DIASTOLIC BLOOD PRESSURE - MUSE: NORMAL MMHG
INTERPRETATION ECG - MUSE: NORMAL
INTERPRETATION ECG - MUSE: NORMAL
P AXIS - MUSE: 53 DEGREES
P AXIS - MUSE: 61 DEGREES
PR INTERVAL - MUSE: 140 MS
PR INTERVAL - MUSE: 144 MS
QRS DURATION - MUSE: 86 MS
QRS DURATION - MUSE: 90 MS
QT - MUSE: 346 MS
QT - MUSE: 372 MS
QTC - MUSE: 412 MS
QTC - MUSE: 434 MS
R AXIS - MUSE: 11 DEGREES
R AXIS - MUSE: 29 DEGREES
SYSTOLIC BLOOD PRESSURE - MUSE: NORMAL MMHG
SYSTOLIC BLOOD PRESSURE - MUSE: NORMAL MMHG
T AXIS - MUSE: 45 DEGREES
T AXIS - MUSE: 58 DEGREES
VENTRICULAR RATE- MUSE: 74 BPM
VENTRICULAR RATE- MUSE: 95 BPM

## 2023-06-12 ENCOUNTER — APPOINTMENT (OUTPATIENT)
Dept: GENERAL RADIOLOGY | Facility: OTHER | Age: 78
End: 2023-06-12
Attending: FAMILY MEDICINE
Payer: COMMERCIAL

## 2023-06-12 ENCOUNTER — HOSPITAL ENCOUNTER (EMERGENCY)
Facility: OTHER | Age: 78
Discharge: HOME | End: 2023-06-12
Attending: FAMILY MEDICINE | Admitting: FAMILY MEDICINE
Payer: COMMERCIAL

## 2023-06-12 VITALS
HEIGHT: 67 IN | WEIGHT: 150 LBS | DIASTOLIC BLOOD PRESSURE: 82 MMHG | RESPIRATION RATE: 25 BRPM | OXYGEN SATURATION: 96 % | HEART RATE: 82 BPM | SYSTOLIC BLOOD PRESSURE: 157 MMHG | TEMPERATURE: 98.4 F | BODY MASS INDEX: 23.54 KG/M2

## 2023-06-12 DIAGNOSIS — R07.89 OTHER CHEST PAIN: Primary | ICD-10-CM

## 2023-06-12 LAB
HOLD SPECIMEN: NORMAL
TROPONIN T SERPL HS-MCNC: 23 NG/L
TROPONIN T SERPL HS-MCNC: 26 NG/L

## 2023-06-12 PROCEDURE — 36415 COLL VENOUS BLD VENIPUNCTURE: CPT | Performed by: FAMILY MEDICINE

## 2023-06-12 PROCEDURE — 93005 ELECTROCARDIOGRAM TRACING: CPT | Performed by: FAMILY MEDICINE

## 2023-06-12 PROCEDURE — 250N000013 HC RX MED GY IP 250 OP 250 PS 637: Performed by: FAMILY MEDICINE

## 2023-06-12 PROCEDURE — 71046 X-RAY EXAM CHEST 2 VIEWS: CPT

## 2023-06-12 PROCEDURE — 99285 EMERGENCY DEPT VISIT HI MDM: CPT | Mod: 25 | Performed by: FAMILY MEDICINE

## 2023-06-12 PROCEDURE — 93010 ELECTROCARDIOGRAM REPORT: CPT | Performed by: INTERNAL MEDICINE

## 2023-06-12 PROCEDURE — 99284 EMERGENCY DEPT VISIT MOD MDM: CPT | Performed by: FAMILY MEDICINE

## 2023-06-12 PROCEDURE — 84484 ASSAY OF TROPONIN QUANT: CPT | Performed by: FAMILY MEDICINE

## 2023-06-12 RX ORDER — ISOSORBIDE MONONITRATE 30 MG/1
30 TABLET, EXTENDED RELEASE ORAL DAILY
Status: DISCONTINUED | OUTPATIENT
Start: 2023-06-12 | End: 2023-06-12 | Stop reason: HOSPADM

## 2023-06-12 RX ORDER — ISOSORBIDE MONONITRATE 30 MG/1
30 TABLET, EXTENDED RELEASE ORAL DAILY
Qty: 30 TABLET | Refills: 0 | Status: SHIPPED | OUTPATIENT
Start: 2023-06-12 | End: 2023-06-27

## 2023-06-12 RX ORDER — AMLODIPINE BESYLATE 5 MG/1
10 TABLET ORAL ONCE
Status: COMPLETED | OUTPATIENT
Start: 2023-06-12 | End: 2023-06-12

## 2023-06-12 RX ORDER — MAGNESIUM HYDROXIDE/ALUMINUM HYDROXICE/SIMETHICONE 120; 1200; 1200 MG/30ML; MG/30ML; MG/30ML
15 SUSPENSION ORAL 3 TIMES DAILY PRN
Status: DISCONTINUED | OUTPATIENT
Start: 2023-06-12 | End: 2023-06-12 | Stop reason: HOSPADM

## 2023-06-12 RX ORDER — MORPHINE SULFATE 4 MG/ML
4 INJECTION, SOLUTION INTRAMUSCULAR; INTRAVENOUS
Status: DISCONTINUED | OUTPATIENT
Start: 2023-06-12 | End: 2023-06-12 | Stop reason: HOSPADM

## 2023-06-12 RX ADMIN — ALUMINUM HYDROXIDE, MAGNESIUM HYDROXIDE, AND DIMETHICONE 15 ML: 200; 20; 200 SUSPENSION ORAL at 17:00

## 2023-06-12 RX ADMIN — ISOSORBIDE MONONITRATE 30 MG: 30 TABLET, EXTENDED RELEASE ORAL at 17:52

## 2023-06-12 RX ADMIN — AMLODIPINE BESYLATE 10 MG: 5 TABLET ORAL at 17:39

## 2023-06-12 ASSESSMENT — ENCOUNTER SYMPTOMS
FEVER: 0
SHORTNESS OF BREATH: 1
CHILLS: 0
CHEST TIGHTNESS: 1
APPETITE CHANGE: 0

## 2023-06-12 ASSESSMENT — ACTIVITIES OF DAILY LIVING (ADL): ADLS_ACUITY_SCORE: 35

## 2023-06-12 NOTE — ED PROVIDER NOTES
"  History     Chief Complaint   Patient presents with     Shortness of Breath     Numbness     HPI  Jared Parry is a 77 year old male who was recently admitted to the \[facility]\"", 2023 for substernal chest pain.  There was a concern related ST depression in precordial leads.  Patient had been started on IV heparin and transferred to [facility] for concern related to non-STEMI. patient tells me he underwent coronary angiogram with stenting to the right coronary artery.  Imaging including coronary angiography are not available to me in epic.    I am able to review the scanned records from [facility].  See [facility] for further details.    Patient is status post appendectomy, hernia repair and knee surgery.    Patient used to smoke but quit over 40 years ago.  Does not use alcohol.    He was recently started on aspirin and Plavix but previously has not tolerated aspirin due to GI discomfort.    Pain today is substernal, nonradiating.  Not associated with nausea or vomiting.  It is associated with shortness of breath and feeling of tingling across his chest and into his hands.    Allergies:  Allergies   Allergen Reactions     Thiopental      Other reaction(s): *Unknown - Childhood Rxn  Patient states \"almost  from Pentothal as child\"     Cephalexin Hives     Dust Mites      Trazodone Difficulty breathing     Aspirin      Other reaction(s): GI Upset     Ciprofloxacin Hives       Problem List:    Patient Active Problem List    Diagnosis Date Noted     Primary hypertension 2023     Priority: Medium     Sigmoid diverticulosis 2022     Priority: Medium     Other insomnia 2021     Priority: Medium     JALYN (generalized anxiety disorder) 2020     Priority: Medium     Mild intermittent asthma without complication 2019     Priority: Medium     History of tobacco use disorder 2019     Priority: Medium     Atherosclerosis of native artery of extremity with intermittent claudication " (H) 06/15/2018     Priority: Medium     H/O adenomatous polyp of colon 2018     Priority: Medium     Esophageal reflux 2018     Priority: Medium     Hyperlipidemia, mild 2018     Priority: Medium     Nephrolithiasis 2018     Priority: Medium     DUNIA on CPAP 2018     Priority: Medium     Popliteal artery embolism, left (H) 2017     Priority: Medium        Past Medical History:    Past Medical History:   Diagnosis Date     Attention-deficit hyperactivity disorder      Benign neoplasm of colon      Calculus of kidney      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     Enlarged prostate without lower urinary tract symptoms (luts)      Essential (primary) hypertension      GERD (gastroesophageal reflux disease)      Hyperlipidemia      DUNIA on CPAP        Past Surgical History:    Past Surgical History:   Procedure Laterality Date     APPENDECTOMY OPEN      No Comments Provided     ARTHROSCOPY KNEE Left      AS REMOVAL OF KIDNEY STONE       COLONOSCOPY  2017    Adenomatous, f/u in      COLONOSCOPY N/A 2022    F/U  if applicable tubular adenoma, diverticulosis     CYSTOSCOPY, TRANSURETHRAL RESECTION (TUR) PROSTATE, COMBINED      No Comments Provided     EMBOLECTOMY LOWER EXTREMITY  2017    Popliteal     HERNIORRHAPHY INGUINAL BILATERAL      [place] in      OPEN REDUCTION INTERNAL FIXATION FEMUR DISTAL Left        Family History:    Family History   Problem Relation Age of Onset     Heart Disease Father          at age 56 of an MI     Hypertension Father      Hyperlipidemia Father      Substance Abuse Father         alcohol use     Diabetes Mother      Heart Disease Mother      Hypertension Brother      Heart Disease Brother         Heart Disease,CAD starting in his late 50s     Hyperlipidemia Brother      Other - See Comments Brother         obesity     Diabetes Brother      Hypertension Brother      Heart Disease Brother       "Hyperlipidemia Brother      Other - See Comments Brother         obesity     Diabetes Brother      Alzheimer Disease Brother      Hypertension Brother      Heart Disease Brother      Prostate Cancer Brother      Other - See Comments Sister          in her mid 60's.  Viral cardiomyopathy,     Diabetes Sister      Arthritis Sister      Hypertension Sister      Hyperlipidemia Sister        Social History:  Marital Status:   [2]  Social History     Tobacco Use     Smoking status: Former     Packs/day: 1.00     Years: 30.00     Pack years: 30.00     Types: Cigarettes     Quit date: 1983     Years since quittin.7     Smokeless tobacco: Former     Quit date: 1986   Vaping Use     Vaping status: Never Used     Passive vaping exposure: Yes   Substance Use Topics     Alcohol use: No     Alcohol/week: 0.0 standard drinks of alcohol     Drug use: Never        Medications:    isosorbide mononitrate (IMDUR) 30 MG 24 hr tablet  albuterol (PROAIR HFA/PROVENTIL HFA/VENTOLIN HFA) 108 (90 Base) MCG/ACT inhaler  amLODIPine (NORVASC) 5 MG tablet  atorvastatin (LIPITOR) 20 MG tablet  clopidogrel (PLAVIX) 75 MG tablet  famotidine (PEPCID) 10 MG tablet  Lactobacillus (PROBIOTIC ACIDOPHILUS) TABS  losartan-hydrochlorothiazide (HYZAAR) 100-12.5 MG tablet  mometasone (NASONEX) 50 MCG/ACT nasal spray  montelukast (SINGULAIR) 10 MG tablet  triamcinolone (NASACORT) 55 MCG/ACT nasal aerosol  zolpidem (AMBIEN) 5 MG tablet          Review of Systems   Constitutional: Negative for appetite change, chills and fever.   Respiratory: Positive for chest tightness and shortness of breath.    Cardiovascular: Positive for chest pain. Negative for leg swelling.   Skin: Negative for rash.       Physical Exam   BP: (!) 172/98  Pulse: 108  Temp: 98.4  F (36.9  C)  Resp: (!) 35  Height: 170.2 cm (5' 7\")  Weight: 68 kg (150 lb)  SpO2: 97 %      Physical Exam  Constitutional:       General: He is not in acute distress.     Appearance: " Normal appearance. He is normal weight. He is not toxic-appearing.      Interventions: He is not intubated.  HENT:      Head: Normocephalic and atraumatic.   Eyes:      General: No scleral icterus.     Conjunctiva/sclera: Conjunctivae normal.   Cardiovascular:      Rate and Rhythm: Regular rhythm. Tachycardia present.      Heart sounds: Normal heart sounds.   Pulmonary:      Effort: Pulmonary effort is normal. No tachypnea, bradypnea, accessory muscle usage or respiratory distress. He is not intubated.      Breath sounds: Normal breath sounds. No stridor.   Chest:      Chest wall: No mass, deformity or crepitus.   Abdominal:      Palpations: Abdomen is soft.      Tenderness: There is no abdominal tenderness.   Musculoskeletal:         General: No deformity.      Cervical back: Neck supple.   Skin:     General: Skin is warm.   Neurological:      Mental Status: He is alert.         ED Course              ED Course as of 06/12/23 1837   Mon Jun 12, 2023   1635 EKG- no change from prior. No ST T changes.    1641 Per pt, stent to RCA done on the 4th. On asa and plavix   1726 Will call [facility] for recommendations. Troponin elevated, likely expected given recent stenting.   1736 Spoke with on call cardiology at [facility]. Repeat trop, if stable or trending down, ok to discharge and keep his outpt follow up for LAD. If increasing, treat per NSTEMI protocol.    1823 Trop stable.      Procedures              EKG Interpretation:      Interpreted by Laureen Ware DO  Time reviewed:   Symptoms at time of EKG: chest pain   Rhythm: normal sinus   Rate: normal  Axis: normal  Ectopy: none  Conduction: normal  ST Segments/ T Waves: No ST-T wave changes  Q Waves: none  Comparison to prior: Unchanged from 6/4/23    Clinical Impression: normal EKG          Critical Care time:  none               Results for orders placed or performed during the hospital encounter of 06/12/23 (from the past 24 hour(s))   Extra Tube    Narrative     The following orders were created for panel order Extra Tube.  Procedure                               Abnormality         Status                     ---------                               -----------         ------                     Extra Blue Top Tube[[id number]]                              Final result               Extra Red Top Tube[[id number]]                               Final result               Extra Green Top (Lithium...[[id number]]                      Final result               Extra Purple Top Tube[[id number]]                            Final result                 Please view results for these tests on the individual orders.   Extra Blue Top Tube   Result Value Ref Range    Hold Specimen JIC    Extra Red Top Tube   Result Value Ref Range    Hold Specimen JIC    Extra Green Top (Lithium Heparin) Tube   Result Value Ref Range    Hold Specimen JIC    Extra Purple Top Tube   Result Value Ref Range    Hold Specimen JIC    Troponin T, High Sensitivity   Result Value Ref Range    Troponin T, High Sensitivity 23 (H) <=22 ng/L   XR Chest 2 Views    Narrative    Exam:  XR CHEST 2 VIEWS    HISTORY: shortness of breath. recent coronary stent placement.    COMPARISON:  6/4/2023    FINDINGS:     The cardiomediastinal contours are normal.      No focal consolidation, effusion, or pneumothorax.      No acute osseous abnormality.       Impression    IMPRESSION:      No acute cardiopulmonary process.      NEREIDA FLORES MD         SYSTEM ID:  [id number]   Troponin T, High Sensitivity   Result Value Ref Range    Troponin T, High Sensitivity 26 (H) <=22 ng/L       Medications   alum & mag hydroxide-simethicone (MAALOX) suspension 15 mL (15 mLs Oral $Given 6/12/23 1700)   morphine (PF) injection 4 mg (has no administration in time range)   isosorbide mononitrate (IMDUR) 24 hr tablet 30 mg (30 mg Oral $Given 6/12/23 5513)   amLODIPine (NORVASC) tablet 10 mg (10 mg Oral $Given 6/12/23 2608)       Assessments & Plan (with  Medical Decision Making)     I have reviewed the nursing notes.    I have reviewed the findings, diagnosis, plan and need for follow up with the patient.           Medical Decision Making  The patient's presentation was of moderate complexity (a chronic illness mild to moderate exacerbation, progression, or side effect of treatment).    The patient's evaluation involved:  review of external note(s) from 1 sources (see separate area of note for details)    The patient's management necessitated moderate risk (prescription drug management including medications given in the ED).        New Prescriptions    ISOSORBIDE MONONITRATE (IMDUR) 30 MG 24 HR TABLET    Take 1 tablet (30 mg) by mouth daily       Final diagnoses:   Other chest pain   Patient has known coronary artery disease.  Status post stenting 1 week ago.  Troponin mildly elevated but decreased from last week.  This would be expected in the setting of recent stent placement.  Additionally, he is known to have an LAD lesion.  I briefly spoke with the on-call cardiologist at [facility].  He is set up for an angiogram on July 6 to treat the LAD stent.  Being that his EKG was unchanged and his troponin were stable, it is felt that he is safe to discharge home.  Medication changes have been made.  Start Imdur.  Increase amlodipine to 10 mg daily.  Patient thinks his blood pressure will improve once he gets home.  If that is the case he does not need to increase amlodipine to 10 mg daily.  Gave him parameters for increasing medication at home specifically, if his systolic blood pressures over 130 to take 10 mg dose.  If his blood pressure is too low specifically systolic 90 then he should hold his amlodipine.  He is at risk given the known LAD lesion.  Expectant management discussed.  If he has a return of his chest pain he should call or return to the emergency room as he could potentially develop a non-STEMI or STEMI due to his known lesion.  Patient and wife  understand and agree with the plan as listed.    6/12/2023   [facility] AND [facility]     Laureen Ware DO  06/12/23 3640

## 2023-06-12 NOTE — DISCHARGE INSTRUCTIONS
For your chest pain we have started you on a medication called isosorbide mononitrate or Imdur.  Take 1 tablet daily.  Okay to take in the morning or at night.  You might have better symptom relief if you take it in the morning.  Continue all other medications.  Additionally, if your blood pressure is running greater than 130 on the top number increase your Norvasc which is also known as amlodipine to 10 mg daily.  This would be an increase from 1 tablet to 2 tablets.  Continue all of your other medications.  Keep your appointment with the cardiologist as scheduled on Thursday.  If you have any return of your chest pain please call your doctor or return to the emergency room.

## 2023-06-12 NOTE — ED TRIAGE NOTES
Pt presents to ED with c/o SOB and numbness in his arms and right leg. Pt has intermittent chest pain. Pt had recent heart attack and Stent placement one week ago.    Kacy Castellanos RN on 6/12/2023 at 4:31 PM       Triage Assessment     Row Name 06/12/23 1630       Triage Assessment (Adult)    Airway WDL WDL       Respiratory WDL    Respiratory WDL X;all    Rhythm/Pattern, Respiratory shortness of breath       Skin Circulation/Temperature WDL    Skin Circulation/Temperature WDL WDL       Cardiac WDL    Cardiac WDL X       Peripheral/Neurovascular WDL    Peripheral Neurovascular WDL WDL

## 2023-06-13 LAB
ATRIAL RATE - MUSE: 105 BPM
DIASTOLIC BLOOD PRESSURE - MUSE: NORMAL MMHG
INTERPRETATION ECG - MUSE: NORMAL
P AXIS - MUSE: 56 DEGREES
PR INTERVAL - MUSE: 142 MS
QRS DURATION - MUSE: 86 MS
QT - MUSE: 340 MS
QTC - MUSE: 449 MS
R AXIS - MUSE: 10 DEGREES
SYSTOLIC BLOOD PRESSURE - MUSE: NORMAL MMHG
T AXIS - MUSE: 70 DEGREES
VENTRICULAR RATE- MUSE: 105 BPM

## 2023-06-15 ENCOUNTER — TELEPHONE (OUTPATIENT)
Dept: CARDIAC REHAB | Facility: OTHER | Age: 78
End: 2023-06-15
Payer: COMMERCIAL

## 2023-06-15 DIAGNOSIS — I21.4 NSTEMI (NON-ST ELEVATED MYOCARDIAL INFARCTION) (H): Primary | ICD-10-CM

## 2023-06-15 NOTE — TELEPHONE ENCOUNTER
Patient verified their .  Called patient re: referral to cardiac rehab received from [facility].  Instructed on program and goals of cardiac rehab.  Appointment set for 23.  Patient verbalized understanding.

## 2023-06-21 ENCOUNTER — HOSPITAL ENCOUNTER (OUTPATIENT)
Dept: CARDIAC REHAB | Facility: OTHER | Age: 78
Discharge: HOME | End: 2023-06-21
Attending: STUDENT IN AN ORGANIZED HEALTH CARE EDUCATION/TRAINING PROGRAM
Payer: COMMERCIAL

## 2023-06-21 PROCEDURE — 93798 PHYS/QHP OP CAR RHAB W/ECG: CPT

## 2023-06-21 RX ORDER — ASPIRIN 81 MG/1
81 TABLET, CHEWABLE ORAL DAILY
COMMUNITY
End: 2024-07-23

## 2023-06-21 ASSESSMENT — ASTHMA QUESTIONNAIRES
QUESTION_3 LAST FOUR WEEKS HOW OFTEN DID YOUR ASTHMA SYMPTOMS (WHEEZING, COUGHING, SHORTNESS OF BREATH, CHEST TIGHTNESS OR PAIN) WAKE YOU UP AT NIGHT OR EARLIER THAN USUAL IN THE MORNING: NOT AT ALL
ACT_TOTALSCORE: 23
QUESTION_5 LAST FOUR WEEKS HOW WOULD YOU RATE YOUR ASTHMA CONTROL: WELL CONTROLLED
QUESTION_1 LAST FOUR WEEKS HOW MUCH OF THE TIME DID YOUR ASTHMA KEEP YOU FROM GETTING AS MUCH DONE AT WORK, SCHOOL OR AT HOME: NONE OF THE TIME
QUESTION_4 LAST FOUR WEEKS HOW OFTEN HAVE YOU USED YOUR RESCUE INHALER OR NEBULIZER MEDICATION (SUCH AS ALBUTEROL): NOT AT ALL
ACT_TOTALSCORE: 23
QUESTION_2 LAST FOUR WEEKS HOW OFTEN HAVE YOU HAD SHORTNESS OF BREATH: ONCE OR TWICE A WEEK

## 2023-06-23 ENCOUNTER — HOSPITAL ENCOUNTER (OUTPATIENT)
Dept: CARDIAC REHAB | Facility: OTHER | Age: 78
Discharge: HOME | End: 2023-06-23
Attending: STUDENT IN AN ORGANIZED HEALTH CARE EDUCATION/TRAINING PROGRAM
Payer: COMMERCIAL

## 2023-06-23 PROCEDURE — 93798 PHYS/QHP OP CAR RHAB W/ECG: CPT

## 2023-06-26 ENCOUNTER — LAB (OUTPATIENT)
Dept: LAB | Facility: OTHER | Age: 78
End: 2023-06-26
Attending: INTERNAL MEDICINE
Payer: COMMERCIAL

## 2023-06-26 DIAGNOSIS — I21.4 NON-ST ELEVATION MYOCARDIAL INFARCTION (NSTEMI) (H): ICD-10-CM

## 2023-06-26 LAB
ANION GAP SERPL CALCULATED.3IONS-SCNC: 10 MMOL/L (ref 7–15)
BASOPHILS # BLD AUTO: 0 10E3/UL (ref 0–0.2)
BASOPHILS NFR BLD AUTO: 0 %
BUN SERPL-MCNC: 23.2 MG/DL (ref 8–23)
CALCIUM SERPL-MCNC: 9.1 MG/DL (ref 8.8–10.2)
CHLORIDE SERPL-SCNC: 99 MMOL/L (ref 98–107)
CREAT SERPL-MCNC: 0.84 MG/DL (ref 0.67–1.17)
DEPRECATED HCO3 PLAS-SCNC: 25 MMOL/L (ref 22–29)
EOSINOPHIL # BLD AUTO: 0.1 10E3/UL (ref 0–0.7)
EOSINOPHIL NFR BLD AUTO: 1 %
ERYTHROCYTE [DISTWIDTH] IN BLOOD BY AUTOMATED COUNT: 13.2 % (ref 10–15)
GFR SERPL CREATININE-BSD FRML MDRD: 90 ML/MIN/1.73M2
GLUCOSE SERPL-MCNC: 97 MG/DL (ref 70–99)
HCT VFR BLD AUTO: 36.6 % (ref 40–53)
HGB BLD-MCNC: 12.7 G/DL (ref 13.3–17.7)
IMM GRANULOCYTES # BLD: 0 10E3/UL
IMM GRANULOCYTES NFR BLD: 0 %
LYMPHOCYTES # BLD AUTO: 1.2 10E3/UL (ref 0.8–5.3)
LYMPHOCYTES NFR BLD AUTO: 14 %
MCH RBC QN AUTO: 31.5 PG (ref 26.5–33)
MCHC RBC AUTO-ENTMCNC: 34.7 G/DL (ref 31.5–36.5)
MCV RBC AUTO: 91 FL (ref 78–100)
MONOCYTES # BLD AUTO: 1 10E3/UL (ref 0–1.3)
MONOCYTES NFR BLD AUTO: 12 %
NEUTROPHILS # BLD AUTO: 6.1 10E3/UL (ref 1.6–8.3)
NEUTROPHILS NFR BLD AUTO: 73 %
NRBC # BLD AUTO: 0 10E3/UL
NRBC BLD AUTO-RTO: 0 /100
PLATELET # BLD AUTO: 211 10E3/UL (ref 150–450)
POTASSIUM SERPL-SCNC: 4.6 MMOL/L (ref 3.4–5.3)
RBC # BLD AUTO: 4.03 10E6/UL (ref 4.4–5.9)
SODIUM SERPL-SCNC: 134 MMOL/L (ref 136–145)
WBC # BLD AUTO: 8.4 10E3/UL (ref 4–11)

## 2023-06-26 PROCEDURE — 80048 BASIC METABOLIC PNL TOTAL CA: CPT | Mod: ZL

## 2023-06-26 PROCEDURE — 36415 COLL VENOUS BLD VENIPUNCTURE: CPT | Mod: ZL

## 2023-06-26 PROCEDURE — 85025 COMPLETE CBC W/AUTO DIFF WBC: CPT | Mod: ZL

## 2023-06-27 ENCOUNTER — OFFICE VISIT (OUTPATIENT)
Dept: FAMILY MEDICINE | Facility: OTHER | Age: 78
End: 2023-06-27
Attending: FAMILY MEDICINE
Payer: COMMERCIAL

## 2023-06-27 VITALS
WEIGHT: 149.4 LBS | SYSTOLIC BLOOD PRESSURE: 132 MMHG | DIASTOLIC BLOOD PRESSURE: 60 MMHG | TEMPERATURE: 98 F | BODY MASS INDEX: 23.4 KG/M2 | HEART RATE: 73 BPM | OXYGEN SATURATION: 99 % | RESPIRATION RATE: 20 BRPM

## 2023-06-27 DIAGNOSIS — J45.20 MILD INTERMITTENT ASTHMA, UNCOMPLICATED: ICD-10-CM

## 2023-06-27 DIAGNOSIS — G47.09 OTHER INSOMNIA: ICD-10-CM

## 2023-06-27 DIAGNOSIS — I70.212 ATHEROSCLEROSIS OF NATIVE ARTERY OF LEFT LOWER EXTREMITY WITH INTERMITTENT CLAUDICATION (H): ICD-10-CM

## 2023-06-27 DIAGNOSIS — I10 PRIMARY HYPERTENSION: ICD-10-CM

## 2023-06-27 PROCEDURE — 99214 OFFICE O/P EST MOD 30 MIN: CPT | Performed by: FAMILY MEDICINE

## 2023-06-27 PROCEDURE — G0463 HOSPITAL OUTPT CLINIC VISIT: HCPCS

## 2023-06-27 RX ORDER — ZOLPIDEM TARTRATE 5 MG/1
5 TABLET ORAL
Qty: 15 TABLET | Refills: 0 | Status: SHIPPED | OUTPATIENT
Start: 2023-06-27 | End: 2023-10-11

## 2023-06-27 RX ORDER — LOSARTAN POTASSIUM 100 MG/1
1 TABLET ORAL DAILY
COMMUNITY
End: 2023-06-27

## 2023-06-27 RX ORDER — CLOPIDOGREL BISULFATE 75 MG/1
75 TABLET ORAL DAILY
Qty: 90 TABLET | Refills: 3 | Status: SHIPPED | OUTPATIENT
Start: 2023-06-27 | End: 2024-08-07

## 2023-06-27 RX ORDER — MONTELUKAST SODIUM 10 MG/1
1 TABLET ORAL AT BEDTIME
Qty: 90 TABLET | Refills: 3 | Status: SHIPPED | OUTPATIENT
Start: 2023-06-27 | End: 2024-07-23

## 2023-06-27 RX ORDER — ATORVASTATIN CALCIUM 20 MG/1
20 TABLET, FILM COATED ORAL DAILY
Qty: 90 TABLET | Refills: 3 | Status: SHIPPED | OUTPATIENT
Start: 2023-06-27 | End: 2023-07-28

## 2023-06-27 RX ORDER — LOSARTAN POTASSIUM 100 MG/1
100 TABLET ORAL DAILY
Qty: 90 TABLET | Refills: 3 | Status: SHIPPED | OUTPATIENT
Start: 2023-06-27 | End: 2024-07-03

## 2023-06-27 RX ORDER — AMLODIPINE BESYLATE 5 MG/1
5 TABLET ORAL DAILY
Qty: 90 TABLET | Refills: 3 | Status: SHIPPED | OUTPATIENT
Start: 2023-06-27 | End: 2024-07-31

## 2023-06-27 ASSESSMENT — PAIN SCALES - GENERAL: PAINLEVEL: NO PAIN (0)

## 2023-06-27 NOTE — NURSING NOTE
Patient here for ER follow up and medication follow up.Medication Reconciliation: complete.    Ayleen Naranjo LPN  6/27/2023 11:51 AM

## 2023-06-28 ENCOUNTER — HOSPITAL ENCOUNTER (OUTPATIENT)
Dept: CARDIAC REHAB | Facility: OTHER | Age: 78
Discharge: HOME | End: 2023-06-28
Attending: STUDENT IN AN ORGANIZED HEALTH CARE EDUCATION/TRAINING PROGRAM
Payer: COMMERCIAL

## 2023-06-28 ENCOUNTER — TELEPHONE (OUTPATIENT)
Dept: FAMILY MEDICINE | Facility: OTHER | Age: 78
End: 2023-06-28
Payer: COMMERCIAL

## 2023-06-28 NOTE — PROGRESS NOTES
"  SUBJECTIVE:   Jared Parry is a 77 year old male who presents to clinic today for the following health issues: Follow-up    Patient arrives here for follow-up med check.  Approximately 1 month he had stenting secondary to MI.  Also at that time he had some puffiness in his face.  That has gone away.  He is thinking it might be related to anxiety.  He is scheduled to have another stent placed in the near future.  Otherwise he is doing well.  He is with his spouse.  Patient reports continued headaches.  Taking Tylenol.  Patient is in need of refill for his medications.    History of Present Illness       Hypertension: He presents for follow up of hypertension.  He does check blood pressure  regularly outside of the clinic. Outside blood pressures have been over 140/90. He follows a low salt diet.     Headaches:   Since the patient's last clinic visit, headaches are: no change  The patient is getting headaches:  Daily  He is able to do normal daily activities when he has a migraine.  The patient is taking the following rescue/relief medications:  Tylenol   Patient states \"I get only a small amount of relief\" from the rescue/relief medications.   The patient is taking the following medications to prevent migraines:  No medications to prevent migraines  In the past 4 weeks, the patient has gone to an Urgent Care or Emergency Room 0 times times due to headaches.    Vascular Disease:  He presents for follow up of vascular disease.  He never takes nitroglycerin. He takes daily aspirin.    He eats 4 or more servings of fruits and vegetables daily.He consumes 0 sweetened beverage(s) daily.He exercises with enough effort to increase his heart rate 30 to 60 minutes per day.  He exercises with enough effort to increase his heart rate 5 days per week.   He is taking medications regularly.        Patient Active Problem List    Diagnosis Date Noted     Primary hypertension 05/17/2023     Priority: Medium     Sigmoid " diverticulosis 06/27/2022     Priority: Medium     Other insomnia 09/07/2021     Priority: Medium     JALYN (generalized anxiety disorder) 01/08/2020     Priority: Medium     Mild intermittent asthma without complication 06/26/2019     Priority: Medium     History of tobacco use disorder 06/03/2019     Priority: Medium     Atherosclerosis of native artery of extremity with intermittent claudication (H) 06/15/2018     Priority: Medium     H/O adenomatous polyp of colon 01/25/2018     Priority: Medium     Esophageal reflux 01/25/2018     Priority: Medium     Hyperlipidemia, mild 01/25/2018     Priority: Medium     Nephrolithiasis 01/25/2018     Priority: Medium     DUNIA on CPAP 01/25/2018     Priority: Medium     Popliteal artery embolism, 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     Priority: Medium     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    On plavix per vascular surgeon     Enlarged prostate without lower urinary tract symptoms (luts)     No Comments Provided     Essential (primary) hypertension     No Comments Provided     GERD (gastroesophageal reflux disease)      Hyperlipidemia     No Comments Provided     DUNIA on CPAP       Past Surgical History:   Procedure Laterality Date     APPENDECTOMY OPEN      No Comments Provided     ARTHROSCOPY KNEE Left      AS REMOVAL OF KIDNEY STONE  2016     COLONOSCOPY  01/25/2017    Adenomatous, f/u in 2022     COLONOSCOPY N/A 06/27/2022    F/U 2027 if applicable tubular adenoma, diverticulosis     CYSTOSCOPY, TRANSURETHRAL RESECTION (TUR) PROSTATE, COMBINED      No Comments Provided     EMBOLECTOMY LOWER EXTREMITY  2017    Popliteal     HERNIORRHAPHY INGUINAL BILATERAL      [place] in 2021     OPEN REDUCTION INTERNAL FIXATION FEMUR DISTAL Left        Review of  Systems     OBJECTIVE:     /60   Pulse 73   Temp 98  F (36.7  C)   Resp 20   Wt 67.8 kg (149 lb 6.4 oz)   SpO2 99%   BMI 23.40 kg/m    Body mass index is 23.4 kg/m .  Physical Exam  Constitutional:       Appearance: Normal appearance.   HENT:      Right Ear: Tympanic membrane normal.   Cardiovascular:      Rate and Rhythm: Normal rate and regular rhythm.   Pulmonary:      Effort: Pulmonary effort is normal.      Breath sounds: Normal breath sounds.   Neurological:      Mental Status: He is alert.   Psychiatric:         Mood and Affect: Mood normal.         Diagnostic Test Results:  none     ASSESSMENT/PLAN:         (G47.09) Other insomnia  Comment: Refill.  Patient is cautioned on risks.  He takes it on a rare basis.  Plan: zolpidem (AMBIEN) 5 MG tablet        (J45.20) Mild intermittent asthma, uncomplicated  Comment: *Currently under good control.  Refill  Plan: montelukast (SINGULAIR) 10 MG tablet           (I70.212) Atherosclerosis of native artery of left lower extremity with intermittent claudication (H)  Comment: Continue Lipitor Plavix.  Plavix for 1 year  Plan: atorvastatin (LIPITOR) 20 MG tablet,         clopidogrel (PLAVIX) 75 MG tablet          (I10) Primary hypertension  Commen blood pressure under good control  Plan: losartan (COZAAR) 100 MG tablet, amLODIPine         (NORVASC) 5 MG tablet            Robert Grayson MD  [facility] AND \[facility]\""

## 2023-07-05 NOTE — TELEPHONE ENCOUNTER
Per call center staff, [facility] won't fill because there is the following message, even after releasing prescription to pharmacy:      Called [facility] and spoke with staff, after verifying Pt's last name and . Prescription needs PA. Paperwork sent to [facility]. Routing to prior authorization department for review.     Cassia Rivera RN .............. 2023  11:18 AM

## 2023-07-10 ENCOUNTER — HOSPITAL ENCOUNTER (OUTPATIENT)
Dept: CARDIAC REHAB | Facility: OTHER | Age: 78
Discharge: HOME | End: 2023-07-10
Attending: STUDENT IN AN ORGANIZED HEALTH CARE EDUCATION/TRAINING PROGRAM
Payer: COMMERCIAL

## 2023-07-10 PROCEDURE — 93798 PHYS/QHP OP CAR RHAB W/ECG: CPT

## 2023-07-12 ENCOUNTER — HOSPITAL ENCOUNTER (OUTPATIENT)
Dept: CARDIAC REHAB | Facility: OTHER | Age: 78
Discharge: HOME | End: 2023-07-12
Attending: STUDENT IN AN ORGANIZED HEALTH CARE EDUCATION/TRAINING PROGRAM
Payer: COMMERCIAL

## 2023-07-12 PROCEDURE — 93798 PHYS/QHP OP CAR RHAB W/ECG: CPT

## 2023-07-12 PROCEDURE — 93797 PHYS/QHP OP CAR RHAB WO ECG: CPT

## 2023-07-14 ENCOUNTER — HOSPITAL ENCOUNTER (OUTPATIENT)
Dept: CARDIAC REHAB | Facility: OTHER | Age: 78
Discharge: HOME | End: 2023-07-14
Attending: STUDENT IN AN ORGANIZED HEALTH CARE EDUCATION/TRAINING PROGRAM
Payer: COMMERCIAL

## 2023-07-14 PROCEDURE — 93798 PHYS/QHP OP CAR RHAB W/ECG: CPT

## 2023-07-17 ENCOUNTER — HOSPITAL ENCOUNTER (OUTPATIENT)
Dept: CARDIAC REHAB | Facility: OTHER | Age: 78
Discharge: HOME | End: 2023-07-17
Attending: STUDENT IN AN ORGANIZED HEALTH CARE EDUCATION/TRAINING PROGRAM
Payer: COMMERCIAL

## 2023-07-17 PROCEDURE — 93798 PHYS/QHP OP CAR RHAB W/ECG: CPT

## 2023-07-19 ENCOUNTER — HOSPITAL ENCOUNTER (OUTPATIENT)
Dept: CARDIAC REHAB | Facility: OTHER | Age: 78
Discharge: HOME | End: 2023-07-19
Attending: STUDENT IN AN ORGANIZED HEALTH CARE EDUCATION/TRAINING PROGRAM
Payer: COMMERCIAL

## 2023-07-19 PROCEDURE — 93798 PHYS/QHP OP CAR RHAB W/ECG: CPT

## 2023-07-24 ENCOUNTER — HOSPITAL ENCOUNTER (OUTPATIENT)
Dept: CARDIAC REHAB | Facility: OTHER | Age: 78
Discharge: HOME | End: 2023-07-24
Attending: STUDENT IN AN ORGANIZED HEALTH CARE EDUCATION/TRAINING PROGRAM
Payer: COMMERCIAL

## 2023-07-24 PROCEDURE — 93798 PHYS/QHP OP CAR RHAB W/ECG: CPT

## 2023-07-26 ENCOUNTER — TELEPHONE (OUTPATIENT)
Dept: FAMILY MEDICINE | Facility: OTHER | Age: 78
End: 2023-07-26

## 2023-07-26 NOTE — TELEPHONE ENCOUNTER
Patient called to talk to [facility]'s nurse. His cardiologist has recommended he increase his atorvastatin to 40 mg. Please call.    Lor Gauthier on 7/26/2023 at 3:18 PM

## 2023-07-27 ENCOUNTER — TELEPHONE (OUTPATIENT)
Dept: FAMILY MEDICINE | Facility: OTHER | Age: 78
End: 2023-07-27
Payer: COMMERCIAL

## 2023-07-27 DIAGNOSIS — I70.212 ATHEROSCLEROSIS OF NATIVE ARTERY OF LEFT LOWER EXTREMITY WITH INTERMITTENT CLAUDICATION (H): Primary | ICD-10-CM

## 2023-07-27 NOTE — TELEPHONE ENCOUNTER
Cardiologist changed his atorvastatin from 20mg to 40 mg  Needs a prescription from Dr. Grayson.  Please call    Patricia Plasencia on 7/27/2023 at 3:34 PM

## 2023-07-27 NOTE — TELEPHONE ENCOUNTER
Spoke with patient he had a stent placed July 6. 2023 and his cardiologist form  increased Atorvastatin to 20 mg 2 tablets. He did have a video visit with him on Monday, he is now in need of a refill. He would like a new prescription to [facility] for 1 40 mg tablet. Ayleen Naranjo LPN .......................7/27/2023  4:47 PM

## 2023-07-28 ENCOUNTER — HOSPITAL ENCOUNTER (OUTPATIENT)
Dept: CARDIAC REHAB | Facility: OTHER | Age: 78
Discharge: HOME | End: 2023-07-28
Attending: STUDENT IN AN ORGANIZED HEALTH CARE EDUCATION/TRAINING PROGRAM
Payer: COMMERCIAL

## 2023-07-28 PROCEDURE — 93798 PHYS/QHP OP CAR RHAB W/ECG: CPT

## 2023-07-28 RX ORDER — ATORVASTATIN CALCIUM 40 MG/1
40 TABLET, FILM COATED ORAL DAILY
Qty: 90 TABLET | Refills: 4 | Status: SHIPPED | OUTPATIENT
Start: 2023-07-28 | End: 2024-07-19

## 2023-08-09 ENCOUNTER — OFFICE VISIT (OUTPATIENT)
Dept: ORTHOPEDICS | Facility: OTHER | Age: 78
End: 2023-08-09
Attending: ORTHOPAEDIC SURGERY
Payer: COMMERCIAL

## 2023-08-09 DIAGNOSIS — M25.511 CHRONIC RIGHT SHOULDER PAIN: Primary | ICD-10-CM

## 2023-08-09 DIAGNOSIS — G89.29 CHRONIC RIGHT SHOULDER PAIN: Primary | ICD-10-CM

## 2023-08-09 PROCEDURE — G0463 HOSPITAL OUTPT CLINIC VISIT: HCPCS

## 2023-08-09 PROCEDURE — 20610 DRAIN/INJ JOINT/BURSA W/O US: CPT | Mod: RT | Performed by: ORTHOPAEDIC SURGERY

## 2023-08-09 PROCEDURE — 250N000011 HC RX IP 250 OP 636: Mod: JZ | Performed by: ORTHOPAEDIC SURGERY

## 2023-08-09 PROCEDURE — 250N000009 HC RX 250: Performed by: ORTHOPAEDIC SURGERY

## 2023-08-09 RX ORDER — TRIAMCINOLONE ACETONIDE 40 MG/ML
40 INJECTION, SUSPENSION INTRA-ARTICULAR; INTRAMUSCULAR ONCE
Status: COMPLETED | OUTPATIENT
Start: 2023-08-09 | End: 2023-08-09

## 2023-08-09 RX ORDER — LIDOCAINE HYDROCHLORIDE 10 MG/ML
4 INJECTION, SOLUTION EPIDURAL; INFILTRATION; INTRACAUDAL; PERINEURAL ONCE
Status: COMPLETED | OUTPATIENT
Start: 2023-08-09 | End: 2023-08-09

## 2023-08-09 RX ADMIN — TRIAMCINOLONE ACETONIDE 40 MG: 40 INJECTION, SUSPENSION INTRA-ARTICULAR; INTRAMUSCULAR at 15:00

## 2023-08-09 RX ADMIN — LIDOCAINE HYDROCHLORIDE 4 ML: 10 INJECTION, SOLUTION INFILTRATION; PERINEURAL at 15:00

## 2023-08-09 NOTE — PROGRESS NOTES
S: Patient comes in with regards to right shoulder pain.  Patient was last seen for an injection in 2021 with excellent response.  Patient has known underlying glenohumeral joint arthrosis.    O: Right shoulder examination shows forward elevation to 160 abduction to 160 internal Tatian to hip level.  Crepitation with flexion extension is noted.    Procedural note : A steroid injection was performed at right shoulder using 4 ml of 1% plain Lidocaine and 40 mg kenalog under sterile conditions.  The patient tolerated the procedure well.     A: Right shoulder glenohumeral joint arthrosis    P: Cortisone injection right shoulder administered here today.  Repeat in the future as necessary.  Long-term management potential joint replacement.

## 2023-08-10 ENCOUNTER — DOCUMENTATION ONLY (OUTPATIENT)
Dept: OTHER | Facility: CLINIC | Age: 78
End: 2023-08-10
Payer: COMMERCIAL

## 2023-08-30 ENCOUNTER — OFFICE VISIT (OUTPATIENT)
Dept: FAMILY MEDICINE | Facility: OTHER | Age: 78
End: 2023-08-30
Attending: FAMILY MEDICINE
Payer: COMMERCIAL

## 2023-08-30 VITALS
HEART RATE: 65 BPM | DIASTOLIC BLOOD PRESSURE: 61 MMHG | RESPIRATION RATE: 17 BRPM | OXYGEN SATURATION: 97 % | HEIGHT: 67 IN | SYSTOLIC BLOOD PRESSURE: 139 MMHG | WEIGHT: 151 LBS | TEMPERATURE: 97.4 F | BODY MASS INDEX: 23.7 KG/M2

## 2023-08-30 DIAGNOSIS — L98.9 SKIN LESION: Primary | ICD-10-CM

## 2023-08-30 DIAGNOSIS — R42 LIGHTHEADEDNESS: ICD-10-CM

## 2023-08-30 PROCEDURE — G0463 HOSPITAL OUTPT CLINIC VISIT: HCPCS

## 2023-08-30 PROCEDURE — 99214 OFFICE O/P EST MOD 30 MIN: CPT | Performed by: FAMILY MEDICINE

## 2023-08-30 ASSESSMENT — PAIN SCALES - GENERAL: PAINLEVEL: NO PAIN (0)

## 2023-08-30 NOTE — NURSING NOTE
"Chief Complaint   Patient presents with    Dizziness     Present the last year, feels like pressure inside of head.         Initial /61 (BP Location: Right arm, Patient Position: Sitting, Cuff Size: Adult Regular)   Pulse 65   Temp 97.4  F (36.3  C) (Tympanic)   Resp 17   Ht 1.702 m (5' 7\")   Wt 68.5 kg (151 lb)   SpO2 97%   BMI 23.65 kg/m   Estimated body mass index is 23.65 kg/m  as calculated from the following:    Height as of this encounter: 1.702 m (5' 7\").    Weight as of this encounter: 68.5 kg (151 lb).   Advance Care Directive on file?   Advance Care Directive provided to patient?     FOOD SECURITY SCREENING QUESTIONS:    The next two questions are to help us understand your food security.  If you are feeling you need any assistance in this area, we have resources available to support you today.    Hunger Vital Signs:  Within the past 12 months we worried whether our food would run out before we got money to buy more. Never  Within the past 12 months the food we bought just didn't last and we didn't have money to get more. Never        Billie Lambert     "

## 2023-08-30 NOTE — PROGRESS NOTES
"  Assessment & Plan     Skin lesion  Referral  - Adult General Surg Referral; Future    Lightheadedness  Etiology unclear.  Possibly anxiety induced.  Not consistent with cardiac in origin CVA.  Blood pressure in origin.  Will monitor for now.                 No follow-ups on file.    Robert Grayson MD  [facility]    Shasha Coleman is a 77 year old, presenting for the following health issues:  Dizziness (Present the last year, feels like pressure inside of head.)      Patient arrives here with episodic lightheadedness.  For about 1 year.  Describes it as feeling like head pressure.  Started about 4 to 5 years ago but not very frequently.  Over the years gotten more more frequently.  [facility] and started on hydroxyzine for anxiety.  States that it feels like a head cold without congestion.  States his head feels puffy.  There is no complaints of palpitations.  Slow heartbeat fast heartbeat.  There is no complaints of muscle or joint problems.  No weakness clumsiness slurred speech or other concerns.    History of Present Illness       Reason for visit:  Puffiness in head  Symptom onset:  More than a month  Symptoms include:  [place] inn head  Symptom intensity:  Moderate  Symptom progression:  Staying the same  Had these symptoms before:  Yes  Has tried/received treatment for these symptoms:  No    He eats 4 or more servings of fruits and vegetables daily.He consumes 0 sweetened beverage(s) daily.He exercises with enough effort to increase his heart rate 30 to 60 minutes per day.  He exercises with enough effort to increase his heart rate 6 days per week.   He is taking medications regularly.               Review of Systems         Objective    /61 (BP Location: Right arm, Patient Position: Sitting, Cuff Size: Adult Regular)   Pulse 65   Temp 97.4  F (36.3  C) (Tympanic)   Resp 17   Ht 1.702 m (5' 7\")   Wt 68.5 kg (151 lb)   SpO2 97%   BMI 23.65 " kg/m    Body mass index is 23.65 kg/m .  Physical Exam  Constitutional:       Appearance: Normal appearance.   HENT:      Head: Normocephalic and atraumatic.      Right Ear: Tympanic membrane normal.      Left Ear: Tympanic membrane normal.      Mouth/Throat:      Mouth: Mucous membranes are moist.   Cardiovascular:      Rate and Rhythm: Normal rate and regular rhythm.      Heart sounds: No murmur heard.  Pulmonary:      Effort: Pulmonary effort is normal.      Breath sounds: Normal breath sounds.   Musculoskeletal:      Comments: Patient is able to move extremities equally well.  Gait was normal.   Neurological:      General: No focal deficit present.      Mental Status: He is alert and oriented to person, place, and time.   Psychiatric:         Mood and Affect: Mood normal.         Thought Content: Thought content normal.

## 2023-08-31 PROBLEM — R42 LIGHTHEADEDNESS: Status: ACTIVE | Noted: 2023-08-31

## 2023-09-12 ENCOUNTER — OFFICE VISIT (OUTPATIENT)
Dept: SURGERY | Facility: OTHER | Age: 78
End: 2023-09-12
Attending: SURGERY
Payer: COMMERCIAL

## 2023-09-12 VITALS
HEIGHT: 67 IN | OXYGEN SATURATION: 98 % | TEMPERATURE: 97.5 F | WEIGHT: 150.4 LBS | RESPIRATION RATE: 20 BRPM | BODY MASS INDEX: 23.61 KG/M2 | HEART RATE: 66 BPM | DIASTOLIC BLOOD PRESSURE: 60 MMHG | SYSTOLIC BLOOD PRESSURE: 118 MMHG

## 2023-09-12 DIAGNOSIS — L98.9 SKIN LESION: ICD-10-CM

## 2023-09-12 PROCEDURE — 88305 TISSUE EXAM BY PATHOLOGIST: CPT

## 2023-09-12 PROCEDURE — G0463 HOSPITAL OUTPT CLINIC VISIT: HCPCS | Mod: 25

## 2023-09-12 PROCEDURE — 11641 EXC F/E/E/N/L MAL+MRG 0.6-1: CPT | Performed by: SURGERY

## 2023-09-12 ASSESSMENT — PAIN SCALES - GENERAL: PAINLEVEL: NO PAIN (0)

## 2023-09-12 NOTE — PATIENT INSTRUCTIONS
Your incision was closed with stitches that will dissolve.  A glue was used to help keep the incision closed.2    It is ok to get the incision wet in the shower on the day after your procedure.     Don't soak in a tub, pool or lake for 7 days. If you have concerns, please call.

## 2023-09-12 NOTE — NURSING NOTE
"Chief Complaint   Patient presents with    Derm Problem     Here today with a lesion of concern on his right temple.       Initial /60 (BP Location: Left arm, Patient Position: Sitting, Cuff Size: Adult Large)   Pulse 66   Temp 97.5  F (36.4  C) (Tympanic)   Resp 20   Ht 1.702 m (5' 7\")   Wt 68.2 kg (150 lb 6.4 oz)   SpO2 98%   BMI 23.56 kg/m   Estimated body mass index is 23.56 kg/m  as calculated from the following:    Height as of this encounter: 1.702 m (5' 7\").    Weight as of this encounter: 68.2 kg (150 lb 6.4 oz).  Medication Reconciliation: complete    Fiona Rossi LPN    "

## 2023-09-12 NOTE — PROGRESS NOTES
Procedure Note      Pre/Post Operative Diagnosis:   right temple skin lesion     Procedure:   Removal of  right temple skin lesion      Surgeon: Talon Arreaga MD    Local Anesthesia: 1% lidocaine with 0.25% Marcaine with epinephrine    Indication for the procedure:  This is a 77 year old male  patient referred by Robert Grayson with a right temple skin lesion .       After explaining the risks to include bleeding, infection, recurrence or need for reexcision, and scarring the patient wished to proceed.    Procedure:   The area was prepped and draped in usual sterile fashion with ChloraPrep.   After, adequate local anesthesia, an 0.6 cm X 1.6 cm elliptical skin incision was made to encompass the  0.6 cm lesion.  The skin was closed with 4-0 Monocryl and Dermabond.      Plan:  The patient will be called to review the pathology. Patient will follow up if there any problems with the wound including redness or drainage.      Talon Arreaga MD....................  9/12/2023   2:07 PM

## 2023-09-12 NOTE — NURSING NOTE
"Chief Complaint   Patient presents with    Derm Problem     Here today with a lesion of concern on his right temple.       Initial /60 (BP Location: Left arm, Patient Position: Sitting, Cuff Size: Adult Large)   Pulse 66   Temp 97.5  F (36.4  C) (Tympanic)   Resp 20   Ht 1.702 m (5' 7\")   Wt 68.2 kg (150 lb 6.4 oz)   SpO2 98%   BMI 23.56 kg/m   Estimated body mass index is 23.56 kg/m  as calculated from the following:    Height as of this encounter: 1.702 m (5' 7\").    Weight as of this encounter: 68.2 kg (150 lb 6.4 oz).  Medication Reconciliation: complete    Fiona Rossi LPN    TIMEOUT  [place] Protocol    A. Pre-procedure verification complete yes  1-relevant information / documentation available, reviewed and properly matched to the patient; 2-consent accurate and complete, 3-equipment and supplies available    B. Site marking complete Yes  Site marked if not in continuous attendance with patient    C. TIME OUT completed yes  Time Out was conducted just prior to starting procedure to verify the eight required elements: 1-patient identity, 2-consent accurate and complete, 3-position, 4-correct side/site marked (if applicable), 5-procedure, 6-relevant images / results properly labeled and displayed (if applicable), 7-antibiotics / irrigation fluids (if applicable), 8-safety precautions.     "

## 2023-09-19 LAB
PATH REPORT.COMMENTS IMP SPEC: NORMAL
PATH REPORT.FINAL DX SPEC: NORMAL
PHOTO IMAGE: NORMAL

## 2023-10-06 ASSESSMENT — ENCOUNTER SYMPTOMS
HEADACHES: 0
COUGH: 0
NERVOUS/ANXIOUS: 1
DYSURIA: 0
HEARTBURN: 1
PALPITATIONS: 0
SHORTNESS OF BREATH: 0
HEMATOCHEZIA: 0
MYALGIAS: 0
WEAKNESS: 0
HEMATURIA: 0
ARTHRALGIAS: 1
DIZZINESS: 0
FREQUENCY: 0
CONSTIPATION: 0
EYE PAIN: 0
CHILLS: 0
NAUSEA: 0
FEVER: 0
ABDOMINAL PAIN: 0
PARESTHESIAS: 0
DIARRHEA: 0
JOINT SWELLING: 0
SORE THROAT: 0

## 2023-10-06 ASSESSMENT — ACTIVITIES OF DAILY LIVING (ADL): CURRENT_FUNCTION: NO ASSISTANCE NEEDED

## 2023-10-09 ENCOUNTER — TRANSFERRED RECORDS (OUTPATIENT)
Dept: HEALTH INFORMATION MANAGEMENT | Facility: OTHER | Age: 78
End: 2023-10-09
Payer: COMMERCIAL

## 2023-10-11 ENCOUNTER — OFFICE VISIT (OUTPATIENT)
Dept: FAMILY MEDICINE | Facility: OTHER | Age: 78
End: 2023-10-11
Attending: FAMILY MEDICINE
Payer: COMMERCIAL

## 2023-10-11 VITALS
OXYGEN SATURATION: 97 % | RESPIRATION RATE: 20 BRPM | BODY MASS INDEX: 23.59 KG/M2 | TEMPERATURE: 98.1 F | SYSTOLIC BLOOD PRESSURE: 122 MMHG | HEART RATE: 71 BPM | WEIGHT: 150.6 LBS | DIASTOLIC BLOOD PRESSURE: 62 MMHG

## 2023-10-11 DIAGNOSIS — I70.212 ATHEROSCLEROSIS OF NATIVE ARTERY OF LEFT LOWER EXTREMITY WITH INTERMITTENT CLAUDICATION (H): ICD-10-CM

## 2023-10-11 DIAGNOSIS — D12.6 TUBULAR ADENOMA OF COLON: Primary | ICD-10-CM

## 2023-10-11 DIAGNOSIS — J45.20 MILD INTERMITTENT ASTHMA, UNCOMPLICATED: ICD-10-CM

## 2023-10-11 DIAGNOSIS — I10 PRIMARY HYPERTENSION: ICD-10-CM

## 2023-10-11 DIAGNOSIS — G47.09 OTHER INSOMNIA: ICD-10-CM

## 2023-10-11 PROBLEM — Z86.0101 H/O ADENOMATOUS POLYP OF COLON: Status: RESOLVED | Noted: 2018-01-25 | Resolved: 2023-10-11

## 2023-10-11 PROBLEM — R42 LIGHTHEADEDNESS: Status: RESOLVED | Noted: 2023-08-31 | Resolved: 2023-10-11

## 2023-10-11 LAB
ANION GAP SERPL CALCULATED.3IONS-SCNC: 10 MMOL/L (ref 7–15)
BUN SERPL-MCNC: 34.6 MG/DL (ref 8–23)
CALCIUM SERPL-MCNC: 9.7 MG/DL (ref 8.8–10.2)
CHLORIDE SERPL-SCNC: 103 MMOL/L (ref 98–107)
CHOLEST SERPL-MCNC: 129 MG/DL
CREAT SERPL-MCNC: 0.97 MG/DL (ref 0.67–1.17)
DEPRECATED HCO3 PLAS-SCNC: 24 MMOL/L (ref 22–29)
EGFRCR SERPLBLD CKD-EPI 2021: 81 ML/MIN/1.73M2
GLUCOSE SERPL-MCNC: 102 MG/DL (ref 70–99)
HDLC SERPL-MCNC: 58 MG/DL
HOLD SPECIMEN: NORMAL
LDLC SERPL CALC-MCNC: 52 MG/DL
NONHDLC SERPL-MCNC: 71 MG/DL
POTASSIUM SERPL-SCNC: 4.4 MMOL/L (ref 3.4–5.3)
SODIUM SERPL-SCNC: 137 MMOL/L (ref 135–145)
TRIGL SERPL-MCNC: 94 MG/DL

## 2023-10-11 PROCEDURE — 91320 SARSCV2 VAC 30MCG TRS-SUC IM: CPT

## 2023-10-11 PROCEDURE — 36415 COLL VENOUS BLD VENIPUNCTURE: CPT | Mod: ZL | Performed by: FAMILY MEDICINE

## 2023-10-11 PROCEDURE — G0439 PPPS, SUBSEQ VISIT: HCPCS | Performed by: FAMILY MEDICINE

## 2023-10-11 PROCEDURE — 80048 BASIC METABOLIC PNL TOTAL CA: CPT | Mod: ZL | Performed by: FAMILY MEDICINE

## 2023-10-11 PROCEDURE — 90662 IIV NO PRSV INCREASED AG IM: CPT

## 2023-10-11 PROCEDURE — 80061 LIPID PANEL: CPT | Mod: ZL | Performed by: FAMILY MEDICINE

## 2023-10-11 RX ORDER — CYCLOSPORINE 0.5 MG/ML
1 EMULSION OPHTHALMIC 2 TIMES DAILY
COMMUNITY

## 2023-10-11 RX ORDER — HYDROXYZINE HYDROCHLORIDE 10 MG/1
20 TABLET, FILM COATED ORAL EVERY 6 HOURS PRN
COMMUNITY
End: 2024-07-23

## 2023-10-11 RX ORDER — ZOLPIDEM TARTRATE 5 MG/1
5 TABLET ORAL
Qty: 15 TABLET | Refills: 0 | Status: SHIPPED | OUTPATIENT
Start: 2023-10-11 | End: 2024-09-11

## 2023-10-11 ASSESSMENT — ENCOUNTER SYMPTOMS
SHORTNESS OF BREATH: 0
ARTHRALGIAS: 1
NERVOUS/ANXIOUS: 1
JOINT SWELLING: 0
EYE PAIN: 0
NAUSEA: 0
COUGH: 0
DIARRHEA: 0
MYALGIAS: 0
FEVER: 0
WEAKNESS: 0
HEMATURIA: 0
CHILLS: 0
HEADACHES: 0
FREQUENCY: 0
ABDOMINAL PAIN: 0
DYSURIA: 0
DIZZINESS: 0
PARESTHESIAS: 0
HEMATOCHEZIA: 0
HEARTBURN: 1
CONSTIPATION: 0
SORE THROAT: 0
PALPITATIONS: 0

## 2023-10-11 ASSESSMENT — ANXIETY QUESTIONNAIRES
GAD7 TOTAL SCORE: 3
5. BEING SO RESTLESS THAT IT IS HARD TO SIT STILL: NOT AT ALL
1. FEELING NERVOUS, ANXIOUS, OR ON EDGE: SEVERAL DAYS
GAD7 TOTAL SCORE: 3
4. TROUBLE RELAXING: NOT AT ALL
IF YOU CHECKED OFF ANY PROBLEMS ON THIS QUESTIONNAIRE, HOW DIFFICULT HAVE THESE PROBLEMS MADE IT FOR YOU TO DO YOUR WORK, TAKE CARE OF THINGS AT HOME, OR GET ALONG WITH OTHER PEOPLE: NOT DIFFICULT AT ALL
2. NOT BEING ABLE TO STOP OR CONTROL WORRYING: SEVERAL DAYS
6. BECOMING EASILY ANNOYED OR IRRITABLE: NOT AT ALL
7. FEELING AFRAID AS IF SOMETHING AWFUL MIGHT HAPPEN: NOT AT ALL
3. WORRYING TOO MUCH ABOUT DIFFERENT THINGS: SEVERAL DAYS

## 2023-10-11 ASSESSMENT — PAIN SCALES - GENERAL: PAINLEVEL: NO PAIN (0)

## 2023-10-11 ASSESSMENT — ASTHMA QUESTIONNAIRES: ACT_TOTALSCORE: 23

## 2023-10-11 ASSESSMENT — ACTIVITIES OF DAILY LIVING (ADL): CURRENT_FUNCTION: NO ASSISTANCE NEEDED

## 2023-10-11 NOTE — LETTER
My Asthma Action Plan    Name: Jared Parry   YOB: 1945  Date: 10/11/2023   My doctor: Robert Grayson MD   My clinic: [facility]        My Rescue Medicine:   Albuterol inhaler (Proair/Ventolin/Proventil HFA)  2-4 puffs EVERY 4 HOURS as needed. Use a spacer if recommended by your provider.   My Asthma Severity:   Intermittent / Exercise Induced  Know your asthma triggers:              GREEN ZONE   Good Control  I feel good  No cough or wheeze  Can work, sleep and play without asthma symptoms       Take your asthma control medicine every day.     If exercise triggers your asthma, take your rescue medication  15 minutes before exercise or sports, and  During exercise if you have asthma symptoms  Spacer to use with inhaler: If you have a spacer, make sure to use it with your inhaler             YELLOW ZONE Getting Worse  I have ANY of these:  I do not feel good  Cough or wheeze  Chest feels tight  Wake up at night   Keep taking your Green Zone medications  Start taking your rescue medicine:  every 20 minutes for up to 1 hour. Then every 4 hours for 24-48 hours.  If you stay in the Yellow Zone for more than 12-24 hours, contact your doctor.  If you do not return to the Green Zone in 12-24 hours or you get worse, start taking your oral steroid medicine if prescribed by your provider.           RED ZONE Medical Alert - Get Help  I have ANY of these:  I feel awful  Medicine is not helping  Breathing getting harder  Trouble walking or talking  Nose opens wide to breathe       Take your rescue medicine NOW  If your provider has prescribed an oral steroid medicine, start taking it NOW  Call your doctor NOW  If you are still in the Red Zone after 20 minutes and you have not reached your doctor:  Take your rescue medicine again and  Call 911 or go to the emergency room right away    See your regular doctor within 2 weeks of an Emergency Room or Urgent Care visit for follow-up  treatment.          Annual Reminders:  Meet with Asthma Educator,  Flu Shot in the Fall, consider Pneumonia Vaccination for patients with asthma (aged 19 and older).    Pharmacy: [id number]  [place], [place] - [address]    Electronically signed by Robert Grayson MD   Date: 10/11/23                    Asthma Triggers  How To Control Things That Make Your Asthma Worse    Triggers are things that make your asthma worse.  Look at the list below to help you find your triggers and   what you can do about them. You can help prevent asthma flare-ups by staying away from your triggers.      Trigger                                                          What you can do   Cigarette Smoke  Tobacco smoke can make asthma worse. Do not allow smoking in your home, car or around you.  Be sure no one smokes at a child s day care or school.  If you smoke, ask your health care provider for ways to help you quit.  Ask family members to quit too.  Ask your health care provider for a referral to Quit Plan to help you quit smoking, or call 6-834-891-PLAN.     Colds, Flu, Bronchitis  These are common triggers of asthma. Wash your hands often.  Don t touch your eyes, nose or mouth.  Get a flu shot every year.     Dust Mites  These are tiny bugs that live in cloth or carpet. They are too small to see. Wash sheets and blankets in hot water every week.   Encase pillows and mattress in dust mite proof covers.  Avoid having carpet if you can. If you have carpet, vacuum weekly.   Use a dust mask and HEPA vacuum.   Pollen and Outdoor Mold  Some people are allergic to trees, grass, or weed pollen, or molds. Try to keep your windows closed.  Limit time out doors when pollen count is high.   Ask you health care provider about taking medicine during allergy season.     Animal Dander  Some people are allergic to skin flakes, urine or saliva from pets with fur or feathers. Keep pets with fur or feathers  out of your home.    If you can t keep the pet outdoors, then keep the pet out of your bedroom.  Keep the bedroom door closed.  Keep pets off cloth furniture and away from stuffed toys.     Mice, Rats, and Cockroaches  Some people are allergic to the waste from these pests.   Cover food and garbage.  Clean up spills and food crumbs.  Store grease in the refrigerator.   Keep food out of the bedroom.   Indoor Mold  This can be a trigger if your home has high moisture. Fix leaking faucets, pipes, or other sources of water.   Clean moldy surfaces.  Dehumidify basement if it is damp and smelly.   Smoke, Strong Odors, and Sprays  These can reduce air quality. Stay away from strong odors and sprays, such as perfume, powder, hair spray, paints, smoke incense, paint, cleaning products, candles and new carpet.   Exercise or Sports  Some people with asthma have this trigger. Be active!  Ask your doctor about taking medicine before sports or exercise to prevent symptoms.    Warm up for 5-10 minutes before and after sports or exercise.     Other Triggers of Asthma  Cold air:  Cover your nose and mouth with a scarf.  Sometimes laughing or crying can be a trigger.  Some medicines and food can trigger asthma.

## 2023-10-11 NOTE — PROGRESS NOTES
"SUBJECTIVE:   Jared is a 77 year old who presents for Preventive Visit.      Are you in the first 12 months of your Medicare coverage?  No    Healthy Habits:     In general, how would you rate your overall health?  Good    Frequency of exercise:  4-5 days/week    Duration of exercise:  45-60 minutes    Do you usually eat at least 4 servings of fruit and vegetables a day, include whole grains    & fiber and avoid regularly eating high fat or \"junk\" foods?  Yes    Taking medications regularly:  Yes    Medication side effects:  None    Ability to successfully perform activities of daily living:  No assistance needed    Home Safety:  No safety concerns identified    Hearing Impairment:  Difficulty following a conversation in a noisy restaurant or crowded room, feel that people are mumbling or not speaking clearly, difficulty following dialogue in the theater, difficult to understand a speaker at a public meeting or [employer] service, need to ask people to speak up or repeat themselves, find that men's voices are easier to understand than woman's, difficulty understanding soft or whispered speech and difficulty understanding speech on the telephone    In the past 6 months, have you been bothered by leaking of urine? Yes    In general, how would you rate your overall mental or emotional health?  Good    Additional concerns today:  No      Today's PHQ-2 Score:       10/11/2023     1:43 PM   PHQ-2 ( 1999 Pfizer)   Q1: Little interest or pleasure in doing things 0   Q2: Feeling down, depressed or hopeless 0   PHQ-2 Score 0   Q1: Little interest or pleasure in doing things Not at all   Q2: Feeling down, depressed or hopeless Not at all   PHQ-2 Score 0           Have you ever done Advance Care Planning? (For example, a Health Directive, POLST, or a discussion with a medical provider or your loved ones about your wishes): Yes, advance care planning is on file.    Hearing aids    Fall risk  Fallen 2 or more times in the past " year?: No  Any fall with injury in the past year?: No    Cognitive Screening   1) Repeat 3 items (Leader, Season, Table)    2) Clock draw: NORMAL  3) 3 item recall: Recalls 3 objects  Results: NORMAL clock, 1-2 items recalled: COGNITIVE IMPAIRMENT LESS LIKELY    Mini-CogTM Copyright ARVIND Gooden. Licensed by the author for use in [facility]; reprinted with permission (jg@[facility]). All rights reserved.      Do you have sleep apnea, excessive snoring or daytime drowsiness? : no    Reviewed and updated as needed this visit by clinical staff   Tobacco  Allergies  Meds              Reviewed and updated as needed this visit by Provider                 Social History     Tobacco Use     Smoking status: Former     Packs/day: 1.00     Years: 30.00     Additional pack years: 0.00     Total pack years: 30.00     Types: Cigarettes     Quit date: 1983     Years since quittin.1     Smokeless tobacco: Former     Quit date: 1986   Substance Use Topics     Alcohol use: No     Alcohol/week: 0.0 standard drinks of alcohol             10/6/2023     2:36 PM   Alcohol Use   Prescreen: >3 drinks/day or >7 drinks/week? Not Applicable     Do you have a current opioid prescription? No  Do you use any other controlled substances or medications that are not prescribed by a provider? None              Current providers sharing in care for this patient include:   Patient Care Team:  Robert Grayson MD as PCP - General (Family Medicine)  Jeff Walton MD as Assigned Musculoskeletal Provider  Robert Grayson MD as Assigned PCP  Talon Arreaga MD as Assigned Surgical Provider    The following health maintenance items are reviewed in Epic and correct as of today:  Health Maintenance   Topic Date Due     RSV VACCINE 60+ (1 - 1-dose 60+ series) Never done     ZOSTER IMMUNIZATION (2 of 3) 2011     COVID-19 Vaccine (6 - Pfizer series) 2023     INFLUENZA VACCINE (1) 2023     MEDICARE ANNUAL  "WELLNESS VISIT  10/10/2023     ASTHMA ACTION PLAN  10/10/2023     ASTHMA CONTROL TEST  04/11/2024     DTAP/TDAP/TD IMMUNIZATION (2 - Td or Tdap) 07/06/2024     LIPID  09/10/2025     COLORECTAL CANCER SCREENING  06/27/2027     ADVANCE CARE PLANNING  08/10/2028     PHQ-2 (once per calendar year)  Completed     Pneumococcal Vaccine: 65+ Years  Completed     HEPATITIS C SCREENING  Addressed     IPV IMMUNIZATION  Aged Out     HPV IMMUNIZATION  Aged Out     MENINGITIS IMMUNIZATION  Aged Out     FALL RISK ASSESSMENT  Discontinued     Lab work is in process  Flu and covid         Review of Systems   Constitutional:  Negative for chills and fever.   HENT:  Positive for hearing loss. Negative for congestion, ear pain and sore throat.    Eyes:  Negative for pain and visual disturbance.   Respiratory:  Negative for cough and shortness of breath.    Cardiovascular:  Negative for chest pain, palpitations and peripheral edema.   Gastrointestinal:  Positive for heartburn. Negative for abdominal pain, constipation, diarrhea, hematochezia and nausea.   Genitourinary:  Negative for dysuria, frequency, genital sores, hematuria, impotence, penile discharge and urgency.   Musculoskeletal:  Positive for arthralgias. Negative for joint swelling and myalgias.   Skin:  Negative for rash.   Neurological:  Negative for dizziness, weakness, headaches and paresthesias.   Psychiatric/Behavioral:  Negative for mood changes. The patient is nervous/anxious.          OBJECTIVE:   /62   Pulse 71   Temp 98.1  F (36.7  C)   Resp 20   Wt 68.3 kg (150 lb 9.6 oz)   SpO2 97%   BMI 23.59 kg/m   Estimated body mass index is 23.59 kg/m  as calculated from the following:    Height as of 9/12/23: 1.702 m (5' 7\").    Weight as of this encounter: 68.3 kg (150 lb 9.6 oz).  Physical Exam  GENERAL: healthy, alert and no distress  NECK: no adenopathy, no asymmetry, masses, or scars and thyroid normal to palpation  RESP: lungs clear to auscultation - no " rales, rhonchi or wheezes  CV: regular rate and rhythm, normal S1 S2, no S3 or S4, no murmur, click or rub, no peripheral edema and peripheral pulses strong  ABDOMEN: soft, nontender, no hepatosplenomegaly, no masses and bowel sounds normal  MS: no gross musculoskeletal defects noted, no edema    Diagnostic Test Results:  Labs reviewed in Epic    ASSESSMENT / PLAN:       ICD-10-CM    1. Tubular adenoma of colon  D12.6       2. Primary hypertension  I10 Basic Metabolic Panel     Basic Metabolic Panel      3. Atherosclerosis of native artery of left lower extremity with intermittent claudication (H24)  I70.212 Lipid Panel     Lipid Panel      4. Other insomnia  G47.09 zolpidem (AMBIEN) 5 MG tablet      5. Mild intermittent asthma, uncomplicated  J45.20               COUNSELING:          He reports that he quit smoking about 40 years ago. His smoking use included cigarettes. He has a 30.00 pack-year smoking history. He quit smokeless tobacco use about 37 years ago.      Appropriate preventive services were discussed with this patient, including applicable screening as appropriate for fall prevention, nutrition, physical activity, Tobacco-use cessation, weight loss and cognition.  Checklist reviewing preventive services available has been given to the patient.    Reviewed patients plan of care and provided an AVS. The Basic Care Plan (routine screening as documented in Health Maintenance) for Jared meets the Care Plan requirement. This Care Plan has been established and reviewed with the Patient.        Robert Grayson MD  [facility] AND \[facility]\""    Identified Health Risks:

## 2023-10-11 NOTE — NURSING NOTE
Patient here for annual wellness visit. Last eye exam summer 2023 and last dental exam August 2023. Medication Reconciliation: complete.    Ayleen Naranjo LPN  10/11/2023 1:56 PM

## 2024-05-22 ENCOUNTER — OFFICE VISIT (OUTPATIENT)
Dept: ORTHOPEDICS | Facility: OTHER | Age: 79
End: 2024-05-22
Attending: ORTHOPAEDIC SURGERY
Payer: COMMERCIAL

## 2024-05-22 DIAGNOSIS — M25.511 CHRONIC RIGHT SHOULDER PAIN: Primary | ICD-10-CM

## 2024-05-22 DIAGNOSIS — G89.29 CHRONIC RIGHT SHOULDER PAIN: Primary | ICD-10-CM

## 2024-05-22 DIAGNOSIS — M25.512 LEFT SHOULDER PAIN, UNSPECIFIED CHRONICITY: ICD-10-CM

## 2024-05-22 PROCEDURE — 250N000009 HC RX 250: Performed by: ORTHOPAEDIC SURGERY

## 2024-05-22 PROCEDURE — G0463 HOSPITAL OUTPT CLINIC VISIT: HCPCS

## 2024-05-22 PROCEDURE — 250N000011 HC RX IP 250 OP 636: Performed by: ORTHOPAEDIC SURGERY

## 2024-05-22 PROCEDURE — 20610 DRAIN/INJ JOINT/BURSA W/O US: CPT | Mod: 50 | Performed by: ORTHOPAEDIC SURGERY

## 2024-05-22 PROCEDURE — G0463 HOSPITAL OUTPT CLINIC VISIT: HCPCS | Mod: 25 | Performed by: ORTHOPAEDIC SURGERY

## 2024-05-22 RX ORDER — TRIAMCINOLONE ACETONIDE 40 MG/ML
40 INJECTION, SUSPENSION INTRA-ARTICULAR; INTRAMUSCULAR ONCE
Status: COMPLETED | OUTPATIENT
Start: 2024-05-22 | End: 2024-05-22

## 2024-05-22 RX ORDER — LIDOCAINE HYDROCHLORIDE 10 MG/ML
8 INJECTION, SOLUTION INFILTRATION; PERINEURAL ONCE
Status: COMPLETED | OUTPATIENT
Start: 2024-05-22 | End: 2024-05-22

## 2024-05-22 RX ADMIN — LIDOCAINE HYDROCHLORIDE 8 ML: 10 INJECTION, SOLUTION INFILTRATION; PERINEURAL at 12:37

## 2024-05-22 RX ADMIN — TRIAMCINOLONE ACETONIDE 40 MG: 40 INJECTION, SUSPENSION INTRA-ARTICULAR; INTRAMUSCULAR at 12:37

## 2024-05-22 NOTE — PROGRESS NOTES
SUBJECTIVE:  Patient returns in regards to bilateral shoulder pain.  Patient is interested in looking at repeat injections.  Patient also wanted to do some long-term planning potentially as well.  Right side seems more symptomatic than left at this point.    ROS: Musculoskeletal and general review of systems are negative, per review of previous clinic questionnaire.  Denies SOB and calf pain.    EXAM: Bilateral shoulder examination shows near full range of motion.  Neuro exam otherwise intact.  Crepitance throughout that arc of motion is certainly present as well.    Procedure note: Bilateral shoulder glenohumeral joints are injected via anterior approach with 4 cc 1% lidocaine and 40 mg of Kenalog under sterile conditions.    IMAGING: None    ASSESSMENT: Bilateral shoulder osteoarthrosis    PLAN: Bilateral shoulder intra-articular injections.  Repeat injections in the future as necessary.  If symptoms continue to progressively get worse I would advocate for updated x-rays on the shoulders and then a plan and discussion for total joint reconstruction.    Jeff Walton MD

## 2024-06-25 ENCOUNTER — OFFICE VISIT (OUTPATIENT)
Dept: FAMILY MEDICINE | Facility: OTHER | Age: 79
End: 2024-06-25
Payer: COMMERCIAL

## 2024-06-25 VITALS
DIASTOLIC BLOOD PRESSURE: 68 MMHG | HEART RATE: 75 BPM | TEMPERATURE: 98.1 F | HEIGHT: 68 IN | WEIGHT: 154.4 LBS | SYSTOLIC BLOOD PRESSURE: 138 MMHG | OXYGEN SATURATION: 96 % | BODY MASS INDEX: 23.4 KG/M2 | RESPIRATION RATE: 20 BRPM

## 2024-06-25 DIAGNOSIS — L57.0 ACTINIC KERATOSIS: Primary | ICD-10-CM

## 2024-06-25 DIAGNOSIS — Z85.828 HISTORY OF SKIN CANCER: ICD-10-CM

## 2024-06-25 DIAGNOSIS — L98.9 SKIN LESION: ICD-10-CM

## 2024-06-25 PROCEDURE — G0463 HOSPITAL OUTPT CLINIC VISIT: HCPCS

## 2024-06-25 PROCEDURE — 99213 OFFICE O/P EST LOW 20 MIN: CPT

## 2024-06-25 ASSESSMENT — ASTHMA QUESTIONNAIRES
QUESTION_4 LAST FOUR WEEKS HOW OFTEN HAVE YOU USED YOUR RESCUE INHALER OR NEBULIZER MEDICATION (SUCH AS ALBUTEROL): NOT AT ALL
QUESTION_3 LAST FOUR WEEKS HOW OFTEN DID YOUR ASTHMA SYMPTOMS (WHEEZING, COUGHING, SHORTNESS OF BREATH, CHEST TIGHTNESS OR PAIN) WAKE YOU UP AT NIGHT OR EARLIER THAN USUAL IN THE MORNING: NOT AT ALL
QUESTION_1 LAST FOUR WEEKS HOW MUCH OF THE TIME DID YOUR ASTHMA KEEP YOU FROM GETTING AS MUCH DONE AT WORK, SCHOOL OR AT HOME: NONE OF THE TIME
ACT_TOTALSCORE: 25
QUESTION_5 LAST FOUR WEEKS HOW WOULD YOU RATE YOUR ASTHMA CONTROL: COMPLETELY CONTROLLED
ACT_TOTALSCORE: 25
QUESTION_2 LAST FOUR WEEKS HOW OFTEN HAVE YOU HAD SHORTNESS OF BREATH: NOT AT ALL

## 2024-06-25 NOTE — PROGRESS NOTES
ASSESSMENT/PLAN:    I have reviewed the nursing notes.  I have reviewed the findings, diagnosis, plan and need for follow up with the patient.    1. Skin lesion  2. History of skin cancer  3. Actinic keratosis    - Adult Dermatology  Referral; Future    - May use over-the-counter Tylenol as needed for pain or fever    - Discussed warning signs/symptoms indicative of need to f/u    - Follow up if symptoms persist or worsen or concerns    - I explained my diagnostic considerations and recommendations to the patient, who voiced understanding and agreement with the treatment plan. All questions were answered. We discussed potential side effects of any prescribed or recommended therapies, as well as expectations for response to treatments.    Jaky Melendez, KIANNA CNP  6/25/2024  11:32 AM    HPI:    Jared Parry is a 78 year old male who presents to [facility] today for concerns of spot on top of head that was coming and going for the past 2 years but has not disappeared now for the past 2 months.  Patient states that he does have a history of skin cancer and has had several lesions removed in the past.  Denies any bleeding but lesion does not disappear anymore like it used to.  Patient states that he has lotion and the area, wears a hat and/or sunscreen while outside.    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    On plavix per vascular surgeon    Enlarged prostate without lower urinary tract symptoms (luts)     No Comments Provided    Essential (primary) hypertension     No Comments Provided    GERD (gastroesophageal reflux disease)     Hyperlipidemia     No Comments Provided    DUNIA on CPAP      Past Surgical History:   Procedure Laterality Date    APPENDECTOMY OPEN      No  Comments Provided    ARTHROSCOPY KNEE Left     AS REMOVAL OF KIDNEY STONE      COLONOSCOPY  2017    Adenomatous, f/u in     COLONOSCOPY N/A 2022    F/U  if applicable tubular adenoma, diverticulosis    CYSTOSCOPY, TRANSURETHRAL RESECTION (TUR) PROSTATE, COMBINED      No Comments Provided    EMBOLECTOMY LOWER EXTREMITY  2017    Popliteal    HERNIORRHAPHY INGUINAL BILATERAL      [place] in     OPEN REDUCTION INTERNAL FIXATION FEMUR DISTAL Left      Social History     Tobacco Use    Smoking status: Former     Current packs/day: 0.00     Average packs/day: 1 pack/day for 30.0 years (30.0 ttl pk-yrs)     Types: Cigarettes     Start date: 1953     Quit date: 1983     Years since quittin.8    Smokeless tobacco: Former     Quit date: 1986   Substance Use Topics    Alcohol use: No     Alcohol/week: 0.0 standard drinks of alcohol     Current Outpatient Medications   Medication Sig Dispense Refill    amLODIPine (NORVASC) 5 MG tablet Take 1 tablet (5 mg) by mouth daily 90 tablet 3    atorvastatin (LIPITOR) 40 MG tablet Take 1 tablet (40 mg) by mouth daily 90 tablet 4    clopidogrel (PLAVIX) 75 MG tablet Take 1 tablet (75 mg) by mouth daily 90 tablet 3    cycloSPORINE (RESTASIS) 0.05 % ophthalmic emulsion Place 1 drop into both eyes 2 times daily      famotidine (PEPCID) 10 MG tablet Take 1 tablet (10 mg) by mouth daily      Lactobacillus (PROBIOTIC ACIDOPHILUS) TABS Take 1 tablet by mouth daily       losartan (COZAAR) 100 MG tablet Take 1 tablet (100 mg) by mouth daily 90 tablet 3    triamcinolone (NASACORT) 55 MCG/ACT nasal aerosol Spray 2 sprays into both nostrils daily      zolpidem (AMBIEN) 5 MG tablet Take 1 tablet (5 mg) by mouth nightly as needed for sleep 15 tablet 0    aspirin (ASA) 81 MG chewable tablet Take 81 mg by mouth daily (Patient not taking: Reported on 2024)      hydrOXYzine (ATARAX) 10 MG tablet Take 20 mg by mouth every 6 hours as needed (Patient not  "taking: Reported on 2024)      montelukast (SINGULAIR) 10 MG tablet Take 1 tablet (10 mg) by mouth At Bedtime (Patient not taking: Reported on 2024) 90 tablet 3     Allergies   Allergen Reactions    Thiopental      Other reaction(s): *Unknown - Childhood Rxn  Patient states \"almost  from Pentothal as child\"    Cephalexin Hives    Dust Mites     Trazodone Difficulty breathing    Ciprofloxacin Hives     Past medical history, past surgical history, current medications and allergies reviewed and accurate to the best of my knowledge.      ROS:  Refer to HPI    /68 (BP Location: Left arm, Patient Position: Sitting, Cuff Size: Adult Regular)   Pulse 75   Temp 98.1  F (36.7  C) (Temporal)   Resp 20   Ht 1.721 m (5' 7.75\")   Wt 70 kg (154 lb 6.4 oz)   SpO2 96%   BMI 23.65 kg/m      EXAM:  General Appearance: Well appearing 78 year old male, appropriate appearance for age. No acute distress   Respiratory: normal chest wall and respirations.  Normal effort.  Clear to auscultation bilaterally, no wheezing, crackles or rhonchi.  No increased work of breathing.  No cough appreciated.  Cardiac: RRR with no murmurs  Musculoskeletal:  Equal movement of bilateral upper extremities.  Equal movement of bilateral lower extremities.  Normal gait.    Dermatological: See attached picture for details  Neuro: Alert and oriented to person, place, and time.    Psychological: normal affect, alert, oriented, and pleasant.     "

## 2024-06-27 DIAGNOSIS — I10 PRIMARY HYPERTENSION: ICD-10-CM

## 2024-07-02 NOTE — TELEPHONE ENCOUNTER
Patient is still waiting for prescription refill. Only has about 10 days left.         Sofya Ledbetter on 7/2/2024 at 9:44 AM

## 2024-07-02 NOTE — TELEPHONE ENCOUNTER
Pending Prescriptions:                       Disp   Refills    losartan (COZAAR) 100 MG tablet [Pharmacy*90 tab*3            Sig: TAKE 1 TABLET(100 MG) BY MOUTH DAILY    Last OV- 10/11/2023 for wellness exam     Last filled- 6/27/2023 for #90 with 3 refills     Next OV- 7/24/2024 with Dr. Grayson for skin spot (wellness exam scheduled in October)     Jennifer Boston CMA on 7/2/2024 at 12:57 PM

## 2024-07-03 RX ORDER — LOSARTAN POTASSIUM 100 MG/1
100 TABLET ORAL DAILY
Qty: 90 TABLET | Refills: 0 | Status: SHIPPED | OUTPATIENT
Start: 2024-07-03 | End: 2024-09-11

## 2024-07-03 NOTE — TELEPHONE ENCOUNTER
Requested Prescriptions   Pending Prescriptions Disp Refills    losartan (COZAAR) 100 MG tablet [Pharmacy Med Name: LOSARTAN 100MG TABLETS] 90 tablet 3     Sig: TAKE 1 TABLET(100 MG) BY MOUTH DAILY       Angiotensin-II Receptors Passed - 7/2/2024 12:57 PM        Passed - Last blood pressure under 140/90 in past 12 months     BP Readings from Last 3 Encounters:   06/25/24 138/68   10/11/23 122/62   09/12/23 118/60       No data recorded            Passed - Medication is active on med list        Passed - Has GFR on file in past 12 months and most recent value is normal        Passed - Medication indicated for associated diagnosis     The medication is prescribed for one or more of the following conditions:    Chronic Kidney Disease (CDK)    Heart Failure (HF)    Diabetes, Nephropathy   Hypertension    Coronary Artery Disease (CAD)   Raynaud's Disease          Passed - Recent (12 mo) or future (90days) visit within the authorizing provider's specialty     The patient must have completed an in-person or virtual visit within the past 12 months or has a future visit scheduled within the next 90 days with the authorizing provider s specialty.  Urgent care and e-visits do not quality as an office visit for this protocol.          Passed - Patient is age 18 or older        Passed - Normal serum potassium on file in past 12 months     Recent Labs   Lab Test 10/11/23  1425   POTASSIUM 4.4                          LOV: 10/11/2023  Future Office visit:    Next 5 appointments (look out 90 days)      Jul 24, 2024 8:40 AM  (Arrive by 8:25 AM)  Provider Visit with Robert Grayson MD  [facility] ([facility] ) [address]  [place] [id number] [phone]           Last Prescription Date:   6/27/2023  Last Fill Qty/Refills:         90, R-3     Limited script sent to cover patient to upcoming appointment.     Oralia Bates RN  ....................   7/3/2024   9:10 AM

## 2024-07-08 ENCOUNTER — TRANSFERRED RECORDS (OUTPATIENT)
Dept: HEALTH INFORMATION MANAGEMENT | Facility: OTHER | Age: 79
End: 2024-07-08
Payer: COMMERCIAL

## 2024-07-19 DIAGNOSIS — I70.212 ATHEROSCLEROSIS OF NATIVE ARTERY OF LEFT LOWER EXTREMITY WITH INTERMITTENT CLAUDICATION (H): ICD-10-CM

## 2024-07-19 NOTE — TELEPHONE ENCOUNTER
Patient has [facility] coverage and with this insurance plan, the patient is eligible to receive certain prescriptions as a 100-day supply at the 90-day supply cost.      Prescriptions to be updated to 100-day supply: atorvastatin    New prescription needed given insufficient refills so pended for PCP review and signature.       Thank you!    Susan Hall, PharmD, [facility]  Population Health Pharmacist  [phone]

## 2024-07-23 RX ORDER — ATORVASTATIN CALCIUM 40 MG/1
40 TABLET, FILM COATED ORAL DAILY
Qty: 100 TABLET | Refills: 0 | Status: SHIPPED | OUTPATIENT
Start: 2024-07-23 | End: 2024-09-11

## 2024-07-29 ENCOUNTER — HOSPITAL ENCOUNTER (OUTPATIENT)
Dept: CT IMAGING | Facility: OTHER | Age: 79
Discharge: HOME | End: 2024-07-29
Attending: INTERNAL MEDICINE | Admitting: INTERNAL MEDICINE
Payer: COMMERCIAL

## 2024-07-29 DIAGNOSIS — I10 PRIMARY HYPERTENSION: ICD-10-CM

## 2024-07-29 DIAGNOSIS — Z01.89 ENCOUNTER FOR OTHER SPECIFIED SPECIAL EXAMINATIONS: ICD-10-CM

## 2024-07-29 PROCEDURE — 71250 CT THORAX DX C-: CPT

## 2024-07-31 DIAGNOSIS — I10 PRIMARY HYPERTENSION: ICD-10-CM

## 2024-07-31 RX ORDER — AMLODIPINE BESYLATE 5 MG/1
5 TABLET ORAL DAILY
Qty: 90 TABLET | Refills: 0 | Status: SHIPPED | OUTPATIENT
Start: 2024-07-31 | End: 2024-09-11

## 2024-07-31 NOTE — TELEPHONE ENCOUNTER
[facility] sent Rx request for the following:      Requested Prescriptions   Pending Prescriptions Disp Refills    amLODIPine (NORVASC) 5 MG tablet [Pharmacy Med Name: AMLODIPINE BESYLATE 5MG TABLETS] 90 tablet 3     Sig: TAKE 1 TABLET(5 MG) BY MOUTH DAILY   Last Prescription Date:   6/27/23  Last Fill Qty/Refills:         90, R-3    Last Office Visit:              10/11/23 (HTN discussed)   Future Office visit:             Future Appointments 7/31/2024 - 1/27/2025        Date Visit Type Length Department Provider     10/14/2024  9:45 AM ANNUAL WELLNESS 30 min  FAMILY PRACTICE True Salmon MD    Location Instructions:     [facility] is located [address]                   Prescription refilled per RN Medication Refill Policy.................... Cassia Rivera RN ....................  7/31/2024   9:47 AM

## 2024-08-01 RX ORDER — AMLODIPINE BESYLATE 5 MG/1
5 TABLET ORAL DAILY
Qty: 90 TABLET | Refills: 3 | OUTPATIENT
Start: 2024-08-01

## 2024-08-02 ENCOUNTER — TELEPHONE (OUTPATIENT)
Dept: FAMILY MEDICINE | Facility: OTHER | Age: 79
End: 2024-08-02
Payer: COMMERCIAL

## 2024-08-02 NOTE — TELEPHONE ENCOUNTER
Patient states that the pharmacy said that DR Grayson would not fill his RX for Amlodipine and that it is not appropriate. Patient is confused as Kenan is the one who gave the RX.    Please advise    Elizabeth Sarmiento on 8/2/2024 at 12:27 PM

## 2024-08-02 NOTE — TELEPHONE ENCOUNTER
Call placed to patient letting him know that the norvas prescription was sent to [facility] pharmacy. Ayleen Naranjo LPN .......................8/2/2024  3:29 PM

## 2024-08-05 DIAGNOSIS — I70.212 ATHEROSCLEROSIS OF NATIVE ARTERY OF LEFT LOWER EXTREMITY WITH INTERMITTENT CLAUDICATION (H): ICD-10-CM

## 2024-08-07 RX ORDER — CLOPIDOGREL BISULFATE 75 MG/1
75 TABLET ORAL DAILY
Qty: 90 TABLET | Refills: 0 | Status: SHIPPED | OUTPATIENT
Start: 2024-08-07 | End: 2024-09-11

## 2024-08-07 NOTE — TELEPHONE ENCOUNTER
[facility] sent Rx request for the following:      Requested Prescriptions   Pending Prescriptions Disp Refills    clopidogrel (PLAVIX) 75 MG tablet [Pharmacy Med Name: CLOPIDOGREL 75MG TABLETS] 90 tablet 3     Sig: TAKE 1 TABLET(75 MG) BY MOUTH DAILY       Plavix Failed - 8/5/2024  9:20 AM        Failed - Normal HGB on file in past 12 months     Recent Labs   Lab Test 06/26/23  0745   HGB 12.7*               Failed - Normal Platelets on file in past 12 months     Recent Labs   Lab Test 06/26/23  0745                  Failed - Medication indicated for associated diagnosis     Medication is associated with one or more of the following diagnoses:  Cerebrovascular accident  Myocardial infarction  Percutaneous coronary intervention  Peripheral arterial occlusive disease         Last Prescription Date:   6/27/23  Last Fill Qty/Refills:         90, R-3    Last Office Visit:              10/11/23   Future Office visit:           10/14/24    Routing refill request to provider for review/approval because:  Labs out of range:  HGB, PLT    Lucrecia Asif RN on 8/7/2024 at 9:18 AM

## 2024-08-19 ENCOUNTER — MEDICAL CORRESPONDENCE (OUTPATIENT)
Dept: HEALTH INFORMATION MANAGEMENT | Facility: OTHER | Age: 79
End: 2024-08-19
Payer: COMMERCIAL

## 2024-09-04 ENCOUNTER — OFFICE VISIT (OUTPATIENT)
Dept: ORTHOPEDICS | Facility: OTHER | Age: 79
End: 2024-09-04
Attending: ORTHOPAEDIC SURGERY
Payer: COMMERCIAL

## 2024-09-04 DIAGNOSIS — G89.29 CHRONIC LEFT SHOULDER PAIN: ICD-10-CM

## 2024-09-04 DIAGNOSIS — G89.29 CHRONIC RIGHT SHOULDER PAIN: Primary | ICD-10-CM

## 2024-09-04 DIAGNOSIS — M25.512 CHRONIC LEFT SHOULDER PAIN: ICD-10-CM

## 2024-09-04 DIAGNOSIS — M25.511 CHRONIC RIGHT SHOULDER PAIN: Primary | ICD-10-CM

## 2024-09-04 PROCEDURE — G0463 HOSPITAL OUTPT CLINIC VISIT: HCPCS

## 2024-09-04 PROCEDURE — 250N000011 HC RX IP 250 OP 636: Mod: JZ | Performed by: ORTHOPAEDIC SURGERY

## 2024-09-04 PROCEDURE — 20610 DRAIN/INJ JOINT/BURSA W/O US: CPT | Mod: 50 | Performed by: ORTHOPAEDIC SURGERY

## 2024-09-04 PROCEDURE — 20610 DRAIN/INJ JOINT/BURSA W/O US: CPT | Mod: 50

## 2024-09-04 PROCEDURE — 250N000009 HC RX 250: Performed by: ORTHOPAEDIC SURGERY

## 2024-09-04 RX ORDER — TRIAMCINOLONE ACETONIDE 40 MG/ML
40 INJECTION, SUSPENSION INTRA-ARTICULAR; INTRAMUSCULAR ONCE
Status: COMPLETED | OUTPATIENT
Start: 2024-09-04 | End: 2024-09-04

## 2024-09-04 RX ORDER — LIDOCAINE HYDROCHLORIDE 10 MG/ML
8 INJECTION, SOLUTION INFILTRATION; PERINEURAL ONCE
Status: COMPLETED | OUTPATIENT
Start: 2024-09-04 | End: 2024-09-04

## 2024-09-04 RX ADMIN — TRIAMCINOLONE ACETONIDE 40 MG: 40 INJECTION, SUSPENSION INTRA-ARTICULAR; INTRAMUSCULAR at 14:10

## 2024-09-04 RX ADMIN — LIDOCAINE HYDROCHLORIDE 8 ML: 10 INJECTION, SOLUTION INFILTRATION; PERINEURAL at 14:10

## 2024-09-04 NOTE — PROGRESS NOTES
SUBJECTIVE:  Patient returns in regards to bilateral shoulder pain.  Patient was last seen in May for an injection.  Patient reports left side continues to bother him more so than the right.  Patient reports that last injection worked very well for him.    ROS: Musculoskeletal and general review of systems are negative, per review of previous clinic questionnaire.  Denies SOB and calf pain.    EXAM: Bilateral shoulder examination shows near full range of motion with crepitance throughout that arc of motion.  No significant deficit in regards to rotator cuff strength noted.  Neuroexam otherwise intact.    PROCEDURE NOTE: Bilateral shoulder glenohumeral joints are injected with 4 cc of 1% lidocaine and 40 mg of Kenalog under sterile conditions via anterior approach.    IMAGING: None     ASSESSMENT: bilateral shoulder glenohumeral joint arthrosis    PLAN: Bilateral shoulder injections here today.  Repeat injection as he sees fit in 3 to 4 months.  Long-term management consider joint reconstruction.    Jeff Walton MD

## 2024-09-11 ENCOUNTER — ALLIED HEALTH/NURSE VISIT (OUTPATIENT)
Dept: FAMILY MEDICINE | Facility: OTHER | Age: 79
End: 2024-09-11
Attending: FAMILY MEDICINE
Payer: COMMERCIAL

## 2024-09-11 VITALS
SYSTOLIC BLOOD PRESSURE: 120 MMHG | WEIGHT: 153.6 LBS | RESPIRATION RATE: 20 BRPM | HEART RATE: 76 BPM | OXYGEN SATURATION: 97 % | TEMPERATURE: 97.9 F | DIASTOLIC BLOOD PRESSURE: 68 MMHG | BODY MASS INDEX: 23.53 KG/M2

## 2024-09-11 DIAGNOSIS — I49.8 PERIODIC HEART FLUTTER: Primary | ICD-10-CM

## 2024-09-11 DIAGNOSIS — I10 PRIMARY HYPERTENSION: ICD-10-CM

## 2024-09-11 DIAGNOSIS — K21.9 GASTROESOPHAGEAL REFLUX DISEASE WITHOUT ESOPHAGITIS: ICD-10-CM

## 2024-09-11 DIAGNOSIS — R00.2 PALPITATIONS: ICD-10-CM

## 2024-09-11 DIAGNOSIS — G47.09 OTHER INSOMNIA: ICD-10-CM

## 2024-09-11 DIAGNOSIS — I70.212 ATHEROSCLEROSIS OF NATIVE ARTERY OF LEFT LOWER EXTREMITY WITH INTERMITTENT CLAUDICATION (H): ICD-10-CM

## 2024-09-11 DIAGNOSIS — I49.8 PERIODIC HEART FLUTTER: ICD-10-CM

## 2024-09-11 LAB
ALBUMIN SERPL BCG-MCNC: 4 G/DL (ref 3.5–5.2)
ALP SERPL-CCNC: 76 U/L (ref 40–150)
ALT SERPL W P-5'-P-CCNC: 27 U/L (ref 0–70)
ANION GAP SERPL CALCULATED.3IONS-SCNC: 10 MMOL/L (ref 7–15)
AST SERPL W P-5'-P-CCNC: 26 U/L (ref 0–45)
BILIRUB SERPL-MCNC: 0.3 MG/DL
BUN SERPL-MCNC: 28.9 MG/DL (ref 8–23)
CALCIUM SERPL-MCNC: 8.9 MG/DL (ref 8.8–10.4)
CHLORIDE SERPL-SCNC: 100 MMOL/L (ref 98–107)
CREAT SERPL-MCNC: 0.97 MG/DL (ref 0.67–1.17)
EGFRCR SERPLBLD CKD-EPI 2021: 80 ML/MIN/1.73M2
GLUCOSE SERPL-MCNC: 118 MG/DL (ref 70–99)
HCO3 SERPL-SCNC: 27 MMOL/L (ref 22–29)
MAGNESIUM SERPL-MCNC: 2.2 MG/DL (ref 1.7–2.3)
POTASSIUM SERPL-SCNC: 4.5 MMOL/L (ref 3.4–5.3)
PROT SERPL-MCNC: 6.7 G/DL (ref 6.4–8.3)
SODIUM SERPL-SCNC: 137 MMOL/L (ref 135–145)
TSH SERPL DL<=0.005 MIU/L-ACNC: 1.7 UIU/ML (ref 0.3–4.2)

## 2024-09-11 PROCEDURE — G0463 HOSPITAL OUTPT CLINIC VISIT: HCPCS

## 2024-09-11 PROCEDURE — 93010 ELECTROCARDIOGRAM REPORT: CPT | Performed by: STUDENT IN AN ORGANIZED HEALTH CARE EDUCATION/TRAINING PROGRAM

## 2024-09-11 PROCEDURE — 82040 ASSAY OF SERUM ALBUMIN: CPT | Mod: ZL | Performed by: FAMILY MEDICINE

## 2024-09-11 PROCEDURE — 83735 ASSAY OF MAGNESIUM: CPT | Mod: ZL | Performed by: FAMILY MEDICINE

## 2024-09-11 PROCEDURE — 84443 ASSAY THYROID STIM HORMONE: CPT | Mod: ZL | Performed by: FAMILY MEDICINE

## 2024-09-11 PROCEDURE — 36415 COLL VENOUS BLD VENIPUNCTURE: CPT | Mod: ZL | Performed by: FAMILY MEDICINE

## 2024-09-11 PROCEDURE — 93005 ELECTROCARDIOGRAM TRACING: CPT | Mod: XU | Performed by: FAMILY MEDICINE

## 2024-09-11 PROCEDURE — 84155 ASSAY OF PROTEIN SERUM: CPT | Mod: ZL | Performed by: FAMILY MEDICINE

## 2024-09-11 PROCEDURE — 999N000157 HC STATISTIC RCP TIME EA 10 MIN

## 2024-09-11 PROCEDURE — 93246 EXT ECG>7D<15D RECORDING: CPT

## 2024-09-11 PROCEDURE — 99214 OFFICE O/P EST MOD 30 MIN: CPT | Performed by: FAMILY MEDICINE

## 2024-09-11 PROCEDURE — G2211 COMPLEX E/M VISIT ADD ON: HCPCS | Performed by: FAMILY MEDICINE

## 2024-09-11 RX ORDER — ATORVASTATIN CALCIUM 40 MG/1
40 TABLET, FILM COATED ORAL DAILY
Qty: 100 TABLET | Refills: 4 | Status: SHIPPED | OUTPATIENT
Start: 2024-09-11

## 2024-09-11 RX ORDER — ZOLPIDEM TARTRATE 5 MG/1
5 TABLET ORAL
Qty: 30 TABLET | Refills: 0 | Status: SHIPPED | OUTPATIENT
Start: 2024-09-11

## 2024-09-11 RX ORDER — AMLODIPINE BESYLATE 5 MG/1
5 TABLET ORAL DAILY
Qty: 90 TABLET | Refills: 4 | Status: SHIPPED | OUTPATIENT
Start: 2024-09-11

## 2024-09-11 RX ORDER — FAMOTIDINE 10 MG
10 TABLET ORAL DAILY
Qty: 90 TABLET | Refills: 4 | Status: SHIPPED | OUTPATIENT
Start: 2024-09-11

## 2024-09-11 RX ORDER — LOSARTAN POTASSIUM 100 MG/1
100 TABLET ORAL DAILY
Qty: 90 TABLET | Refills: 4 | Status: SHIPPED | OUTPATIENT
Start: 2024-09-11

## 2024-09-11 RX ORDER — CLOPIDOGREL BISULFATE 75 MG/1
75 TABLET ORAL DAILY
Qty: 90 TABLET | Refills: 4 | Status: SHIPPED | OUTPATIENT
Start: 2024-09-11

## 2024-09-11 ASSESSMENT — PAIN SCALES - GENERAL: PAINLEVEL: NO PAIN (0)

## 2024-09-11 NOTE — NURSING NOTE
Patient here for Afib, fluttering of his heart, dizzy at times, denies SOB. Has noticed this for the past 3 months. Medication Reconciliation: complete.    Ayleen Naranjo LPN  9/11/2024 2:20 PM

## 2024-09-11 NOTE — PROGRESS NOTES
Patient arrived (date)9/11/2024 (time)1530 for a 14 day Zio monitor per (provider name) Dr. Grayson for palpitations (Dx).  Patient's skin was prepped per protocol.  Zio monitor was placed.  Patient verbalized understanding of instructions and plan of care.  Patient was given Zio diary and prepaid .    RT [place].

## 2024-09-12 LAB
ATRIAL RATE - MUSE: 75 BPM
DIASTOLIC BLOOD PRESSURE - MUSE: NORMAL MMHG
INTERPRETATION ECG - MUSE: NORMAL
P AXIS - MUSE: 57 DEGREES
PR INTERVAL - MUSE: 150 MS
QRS DURATION - MUSE: 78 MS
QT - MUSE: 374 MS
QTC - MUSE: 417 MS
R AXIS - MUSE: 23 DEGREES
SYSTOLIC BLOOD PRESSURE - MUSE: NORMAL MMHG
T AXIS - MUSE: 60 DEGREES
VENTRICULAR RATE- MUSE: 75 BPM

## 2024-09-12 NOTE — PROGRESS NOTES
SUBJECTIVE:   Jared Parry is a 78 year old male who presents to clinic today for the following health issues: Skipping beats    Patient arrives here because of fluttering.  Occasionally getting dizzy during the fluttering.  And skipping beats.  He has noticed his heart rate rapid.  This has been going on for 3 months.  Last for about 2 minutes and then slowly goes away.  Occasionally he gets lightheaded with it.  No previous history of it in the past.  Patient is also requesting medication refilled    History of Present Illness       Reason for visit:  Afib  Symptom onset:  More than a month  Symptoms include:  Heart skipping beats  Symptom intensity:  Mild  Symptom progression:  Staying the same  Had these symptoms before:  Yes  Has tried/received treatment for these symptoms:  No  What makes it worse:  No  What makes it better:  No   He is taking medications regularly.            Review of Systems     OBJECTIVE:     /68   Pulse 76   Temp 97.9  F (36.6  C)   Resp 20   Wt 69.7 kg (153 lb 9.6 oz)   SpO2 97%   BMI 23.53 kg/m    Body mass index is 23.53 kg/m .  Physical Exam  Constitutional:       Appearance: Normal appearance.   HENT:      Head: Normocephalic.   Cardiovascular:      Rate and Rhythm: Normal rate and regular rhythm.      Heart sounds: No murmur heard.  Pulmonary:      Effort: Pulmonary effort is normal.      Breath sounds: Normal breath sounds.   Skin:     General: Skin is warm.   Neurological:      Mental Status: He is alert.   Psychiatric:         Mood and Affect: Mood normal.         Thought Content: Thought content normal.         Diagnostic Test Results:  Results for orders placed or performed in visit on 09/11/24 (from the past 24 hour(s))   EKG 12-lead, tracing only (Same Day)   Result Value Ref Range    Systolic Blood Pressure  mmHg    Diastolic Blood Pressure  mmHg    Ventricular Rate 75 BPM    Atrial Rate 75 BPM    NE Interval 150 ms    QRS Duration 78 ms     ms    QTc  417 ms    P Axis 57 degrees    R AXIS 23 degrees    T Axis 60 degrees    Interpretation ECG       Sinus rhythm  Normal ECG  When compared with ECG of 12-Jun-2023 16:32,  No significant change was found     Comprehensive Metabolic Panel   Result Value Ref Range    Sodium 137 135 - 145 mmol/L    Potassium 4.5 3.4 - 5.3 mmol/L    Carbon Dioxide (CO2) 27 22 - 29 mmol/L    Anion Gap 10 7 - 15 mmol/L    Urea Nitrogen 28.9 (H) 8.0 - 23.0 mg/dL    Creatinine 0.97 0.67 - 1.17 mg/dL    GFR Estimate 80 >60 mL/min/1.73m2    Calcium 8.9 8.8 - 10.4 mg/dL    Chloride 100 98 - 107 mmol/L    Glucose 118 (H) 70 - 99 mg/dL    Alkaline Phosphatase 76 40 - 150 U/L    AST 26 0 - 45 U/L    ALT 27 0 - 70 U/L    Protein Total 6.7 6.4 - 8.3 g/dL    Albumin 4.0 3.5 - 5.2 g/dL    Bilirubin Total 0.3 <=1.2 mg/dL   Magnesium   Result Value Ref Range    Magnesium 2.2 1.7 - 2.3 mg/dL   TSH Reflex GH   Result Value Ref Range    TSH 1.70 0.30 - 4.20 uIU/mL       ASSESSMENT/PLAN:       (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  Will proceed with a Zio patch  Plan: EKG 12-lead, tracing only (Same Day),         Comprehensive Metabolic Panel, Magnesium, TSH         Reflex GH, HC ZIO PATCH 8-14 DAYS APPLICATION,         ZIO PATCH 8-14 DAYS (additional cost to         patient)        (R00.2) Palpitations  Comment:   Plan: HC ZIO PATCH 8-14 DAYS APPLICATION, ZIO PATCH         8-14 DAYS (additional cost to patient)            (G47.09) Other insomnia  Comment: Discussed with the patient the increased risk of dying.  Advised to try to use 2-1/2 mg instead of 5.  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 Due to the added complexity in care, I will continue to support Jared in the subsequent management and with ongoing continuity of care.      Robert Grayson MD  [facility]

## 2024-09-20 ENCOUNTER — TELEPHONE (OUTPATIENT)
Dept: FAMILY MEDICINE | Facility: OTHER | Age: 79
End: 2024-09-20
Payer: COMMERCIAL

## 2024-09-20 NOTE — TELEPHONE ENCOUNTER
Updating health Maintenance  with Immunizations   Edita Bronson LPN........................9/20/2024  11:08 AM

## 2024-09-23 ENCOUNTER — MEDICAL CORRESPONDENCE (OUTPATIENT)
Dept: HEALTH INFORMATION MANAGEMENT | Facility: OTHER | Age: 79
End: 2024-09-23
Payer: COMMERCIAL

## 2024-09-29 PROCEDURE — 93248 EXT ECG>7D<15D REV&INTERPJ: CPT | Performed by: STUDENT IN AN ORGANIZED HEALTH CARE EDUCATION/TRAINING PROGRAM

## 2024-10-01 DIAGNOSIS — J45.20 MILD INTERMITTENT ASTHMA, UNCOMPLICATED: Primary | ICD-10-CM

## 2024-10-01 RX ORDER — MONTELUKAST SODIUM 10 MG/1
10 TABLET ORAL AT BEDTIME
Qty: 90 TABLET | Refills: 3 | Status: SHIPPED | OUTPATIENT
Start: 2024-10-01

## 2024-10-01 NOTE — TELEPHONE ENCOUNTER
Patient called in regarding medications. Patient states when he was in to see MBL and renewed his prescriptions, one got missed. Montelukast never got filled. Patient uses [facility] pharmacy.    Gayle Crain on 10/1/2024 at 12:50 PM

## 2024-10-01 NOTE — TELEPHONE ENCOUNTER
[facility] sent Rx request for the following:      Requested Prescriptions   Pending Prescriptions Disp Refills    montelukast (SINGULAIR) 10 MG tablet 90 tablet 3     Sig: Take 1 tablet (10 mg) by mouth at bedtime.       Leukotriene Inhibitors Protocol Failed - 10/1/2024  1:33 PM        Failed - Medication is active on med list        Failed - Medication indicated for associated diagnosis     Medication is associated with one or more of the following diagnoses:   Allergies   Asthma   Atopic Dermatitis   Nasal Congestion   Nasal discharge   Rhinitis   Urticaria, chronic         Last Prescription Date:   06/27/23  Last Fill Qty/Refills:         90, R-3    Last Office Visit:              09/11/24 (Kenan)  Future Office visit:             Next 5 appointments (look out 90 days)      Oct 14, 2024 9:45 AM  (Arrive by 9:30 AM)  Annual Wellness Visit with True Salmon MD  [facility] ([facility] ) [address]  [place] [id number] [phone]           Unable to complete prescription refill per RN Medication Refill Policy. Per patient's report below, at his last visit with Dr. Grayson, this was missed on refills. Patient is requesting a refill. Will route to a covering provider for refill consideration.  Farhana Laura RN on 10/1/2024 at 1:38 PM

## 2024-10-02 ENCOUNTER — TELEPHONE (OUTPATIENT)
Dept: FAMILY MEDICINE | Facility: OTHER | Age: 79
End: 2024-10-02
Payer: COMMERCIAL

## 2024-10-02 NOTE — TELEPHONE ENCOUNTER
Patient is wondering if he is due for a tetanus shot at this time. He had called to schedule his Covid and flu shots and was wondering.    Lor Gauthier on 10/2/2024 at 12:04 PM

## 2024-10-02 NOTE — TELEPHONE ENCOUNTER
After verifying patient's name and date of birth, notified patient that he is due for a tetanus and can get that at the pharmacy.  Norma J. Gosselin, LPN .......  10/2/2024  1:15 PM     Norma J. Gosselin, LPN....10/2/2024 1:14 PM

## 2024-10-07 ENCOUNTER — IMMUNIZATION (OUTPATIENT)
Dept: FAMILY MEDICINE | Facility: OTHER | Age: 79
End: 2024-10-07
Attending: FAMILY MEDICINE
Payer: COMMERCIAL

## 2024-10-07 DIAGNOSIS — Z23 ENCOUNTER FOR IMMUNIZATION: Primary | ICD-10-CM

## 2024-10-07 PROCEDURE — 90480 ADMN SARSCOV2 VAC 1/ONLY CMP: CPT

## 2024-10-07 PROCEDURE — G0008 ADMIN INFLUENZA VIRUS VAC: HCPCS

## 2024-10-07 NOTE — PROGRESS NOTES
Prior to immunization administration, verified patients identity using patient s name and date of birth. Please see Immunization Activity for additional information.     Is the patient's temperature normal (100.5 or less)? Yes     Patient MEETS CRITERIA. PROCEED with vaccine administration.      Patient instructed to remain in clinic for 15 minutes afterwards, and to report any adverse reactions.      Link to Ancillary Visit Immunization Standing Orders SmartSet     Screening performed by Misti Jackson RN on 10/7/2024 at 2:46 PM.

## 2024-10-09 ENCOUNTER — TRANSFERRED RECORDS (OUTPATIENT)
Dept: HEALTH INFORMATION MANAGEMENT | Facility: OTHER | Age: 79
End: 2024-10-09
Payer: COMMERCIAL

## 2024-12-19 NOTE — NURSING NOTE
"Patient presents to the [facility] today for a painless bump he noticed on his head for the past 6 months. Patient states the bump has come and gone before but over the last 6 months has not disappeared.    Chief Complaint   Patient presents with    Derm Problem           Initial /68 (BP Location: Left arm, Patient Position: Sitting, Cuff Size: Adult Regular)   Pulse 75   Temp 98.1  F (36.7  C) (Temporal)   Resp 20   Ht 1.721 m (5' 7.75\")   Wt 70 kg (154 lb 6.4 oz)   SpO2 96%   BMI 23.65 kg/m   Estimated body mass index is 23.65 kg/m  as calculated from the following:    Height as of this encounter: 1.721 m (5' 7.75\").    Weight as of this encounter: 70 kg (154 lb 6.4 oz).     FOOD SECURITY SCREENING QUESTIONS:    The next two questions are to help us understand your food security.  If you are feeling you need any assistance in this area, we have resources available to support you today.    Hunger Vital Signs:  Within the past 12 months we worried whether our food would run out before we got money to buy more. Never  Within the past 12 months the food we bought just didn't last and we didn't have money to get more. Never      Isabel Brandon     " PSYCHOTIC SYMPTOMS

## 2025-03-19 NOTE — NURSING NOTE
March 19, 2025     Robinson Ackerman DO  [id number] [facility]  [address]  [facility] [address]    Patient: Michael Szymanski   YOB: 1946   Date of Visit: 3/19/2025       Dear Dr. Robinson Ackerman DO:    Thank you for referring Michael Szymanski to me for evaluation. Below are my notes for this consultation.  If you have questions, please do not hesitate to call me. I look forward to following your patient along with you.       Sincerely,     Tye Brizuela MD      CC: No Recipients  ______________________________________________________________________________________    History Of Present Illness  Michael Szymansik is a 78 y.o. male     Duration of type 2 diabetes mellitus:  25 years  Complications:  Chronic kidney disease     Right leg wound, healed per patient    Planning colon resection    Lantus 62 units at bedtime     Humalog still taking after meals, 6-8 units  He has difficulty choosing a dose before eating     DexCom G7, off x1 month due to non-delivery of sensors from [facility]  Patient is testing glucose 3-4 times daily off CGM  Records reviewed, on file    Last eye exam:  8/6/24    Past Medical History  He has a past medical history of Acute pansinusitis, unspecified (08/06/2019), Benign prostatic hyperplasia without lower urinary tract symptoms, Cellulitis of left lower limb (10/08/2021), Class 2 obesity with body mass index (BMI) of 37.0 to 37.9 in adult (02/27/2025), Encounter for immunization (03/21/2017), Encounter for screening for colorectal cancer in high risk patient, Hyperlipidemia, unspecified (12/10/2015), Hypertensive chronic kidney disease with stage 1 through stage 4 chronic kidney disease, or unspecified chronic kidney disease (12/09/2021), Klinefelter syndrome, unspecified (HHS-HCC), Localized edema (09/29/2021), Low back pain, unspecified (04/25/2018), Morbid (severe) obesity due to excess calories (Multi) (05/14/2020), Morbid (severe) obesity due to excess calories (Multi) (12/11/2021), Obesity,  Patient Information     Patient Name MRJared Clay [id number] Male 1945      Nursing Note by Dahiana Alexis LPN at 2017  3:00 PM     Author:  Dahiana Alexis LPN Service:  (none) Author Type:  NURS- Licensed Practical Nurse     Filed:  2017  3:01 PM Encounter Date:  2017 Status:  Signed     :  Dahiana Alexis LPN (NURS- Licensed Practical Nurse)            The patient is here today to have a annual check up. The patient has not been to the eye doctor in the last year.  Dahiana Alexis LPN......2017  2:46 PM           unspecified (08/07/2019), Other abnormal glucose, Other conditions influencing health status (03/12/2022), Other conditions influencing health status (03/12/2022), Other specified abnormal findings of blood chemistry (02/13/2020), Other specified symptoms and signs involving the circulatory and respiratory systems (12/31/2018), Other symptoms and signs involving the musculoskeletal system (04/25/2018), Other tear of medial meniscus, current injury, unspecified knee, initial encounter, Personal history of diseases of the skin and subcutaneous tissue (08/07/2019), Personal history of other diseases of male genital organs, Personal history of other diseases of the circulatory system, Personal history of other diseases of the musculoskeletal system and connective tissue, Personal history of other diseases of the musculoskeletal system and connective tissue, Personal history of other diseases of the nervous system and sense organs, Personal history of other diseases of the respiratory system (06/25/2018), Personal history of other diseases of the respiratory system, Personal history of other diseases of urinary system (04/19/2017), Personal history of other endocrine, nutritional and metabolic disease, Personal history of other endocrine, nutritional and metabolic disease, Personal history of other endocrine, nutritional and metabolic disease, Personal history of other medical treatment, Personal history of other specified conditions (05/14/2020), Personal history of other specified conditions (10/21/2021), Personal history of other specified conditions (06/19/2013), Personal history of urinary (tract) infections (01/13/2022), Proteinuria, unspecified, Spondylosis without myelopathy or radiculopathy, cervical region, Sprain of ribs, initial encounter (09/09/2021), Sprain of ribs, initial encounter (09/09/2021), Strain of muscle, fascia and tendon of lower back, subsequent encounter (09/10/2019), Type 2 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    Surgical History  He has a past surgical history that includes Tonsillectomy (05/29/2013); Gallbladder surgery (06/19/2013); Prostatectomy (06/19/2013); Cataract extraction (06/19/2013); Colonoscopy (02/06/2017); Knee surgery (02/06/2017); Esophagogastroduodenoscopy (02/06/2017); Cystoscopy (02/06/2017); Cholecystectomy (02/06/2017); and Knee surgery (02/06/2017).     Social History  He reports that he has never smoked. He has never been exposed to tobacco smoke. He has never used smokeless tobacco. He reports that he does not drink alcohol and does not use drugs.    Family History  Family History   Problem Relation Name Age of Onset   • Pancreatic cancer Mother     • Diabetes Mother     • Heart disease Mother     • Kidney disease Father     • Hearing loss Brother     • Other (elevated psa) Brother          serum   • Other (cardiac disorder) Maternal Grandmother     • Other (cardiac disorder) Maternal Grandfather     • Other (cardiac disorder) Paternal Grandmother     • Other (cardiac disorder) Paternal Grandfather         Medications  Current Outpatient Medications   Medication Instructions   • acetaminophen (Tylenol) 325 mg tablet 1 tablet as needed   • amLODIPine (NORVASC) 10 mg, oral, Every 24 hours   • aspirin 81 mg chewable tablet 1 tablet, Daily   • blood-glucose sensor (Dexcom G7 Sensor) device For continuous glucose monitoring.  Change every 10 days.   • calcitriol (Rocaltrol) 0.5 mcg capsule 1 capsule, Every 24 hours   • Dexcom G4 platinum  (Dexcom G7 ) misc For continuous glucose monitoring.   • FreeStyle Lite Strips strip For glucose testing 3 times daily   • gabapentin (NEURONTIN) 100 mg, oral, Nightly   • insulin lispro (HUMALOG KWIKPEN INSULIN) 4-10 Units, subcutaneous, 3 times daily  "before meals, Take as directed per insulin instructions.   • Lantus U-100 Insulin 62-65 Units, subcutaneous, Nightly   • metoprolol succinate XL (TOPROL-XL) 50 mg, oral, Daily   • omega 3-dha-epa-fish oil (Fish OiL) 1,200 (144-216) mg capsule Fish Oil 1200 MG Oral Capsule  Refills: 0  Start: 6-May-2021   • omeprazole 20 mg tablet,disintegrat, delay rel Take by mouth.   • pen needle, diabetic (Novofine 32) 32 gauge x 1/4\" needle 3-4 times daily   • pravastatin (Pravachol) 20 mg tablet take 1 tablet by mouth ONCE every 24 hours   • testosterone (ANDROGEL) 50 mg, transdermal, Daily   • torsemide (Demadex) 20 mg tablet 1 tablet, Daily       Allergies  Cortisone and Penicillins    Review of Systems   Constitutional:  Negative for appetite change and fever.   HENT:  Positive for dental problem and tinnitus. Negative for sore throat.         Denies dry mouth   Eyes:  Negative for visual disturbance.   Respiratory:  Negative for cough and shortness of breath.    Cardiovascular:  Negative for chest pain.   Gastrointestinal:  Negative for abdominal pain, constipation, diarrhea, nausea and vomiting.   Endocrine: Negative for polydipsia and polyuria.   Genitourinary:  Negative for frequency.   Musculoskeletal:  Positive for arthralgias and back pain.   Skin:  Negative for rash.   Neurological:  Negative for headaches.   Psychiatric/Behavioral:  The patient is not nervous/anxious.          Last Recorded Vitals  Blood pressure 143/69, pulse 57, weight 101 kg (223 lb).    Physical Exam  Constitutional:       General: He is not in acute distress.  HENT:      Head: Normocephalic.      Mouth/Throat:      Mouth: Mucous membranes are moist.   Eyes:      Extraocular Movements: Extraocular movements intact.   Neck:      Thyroid: No thyroid mass or thyromegaly.   Cardiovascular:      Pulses:           Radial pulses are 2+ on the right side and 2+ on the left side.   Lymphadenopathy:      Cervical: No cervical adenopathy.   Neurological: "      Mental Status: He is alert.      Motor: No tremor.   Psychiatric:         Mood and Affect: Affect normal.          Relevant Results  Glucose (mg/dL)   Date Value   11/19/2024 79   05/14/2024 159 (H)   02/26/2024 140 (H)     POC HEMOGLOBIN A1c (%)   Date Value   12/04/2024 7.9 (A)   08/22/2024 8.0 (A)   01/25/2024 9.7 (A)     Bicarbonate (mmol/L)   Date Value   11/19/2024 23   05/14/2024 25   02/26/2024 23     Urea Nitrogen (mg/dL)   Date Value   11/19/2024 43 (H)   05/14/2024 48 (H)   02/26/2024 62 (H)     Creatinine (mg/dL)   Date Value   11/19/2024 3.36 (H)   05/14/2024 2.84 (H)   02/26/2024 2.90 (H)       IMPRESSION  TYPE 2 DIABETES MELLITUS  LONG TERM CURRENT INSULIN USE   Rapid A1c 7.2%  Overall glucose control improved  Patient was adhering to and benefitting from continuous glucose monitoring until denied by supplier.         RECOMMENDATIONS  Lantus 60 units at bedtime    Resume DexCom G7    Follow up 3 months      For colon surgery:  Decrease Lantus to 35 units the night before prep and the night before surgery  Hold Humalog the day of prep and morning of surgery

## 2025-04-02 ENCOUNTER — ANCILLARY ORDERS (OUTPATIENT)
Dept: RADIOLOGY | Facility: CLINIC | Age: 80
End: 2025-04-02

## 2025-04-02 ENCOUNTER — OFFICE VISIT (OUTPATIENT)
Dept: ORTHOPEDICS | Facility: OTHER | Age: 80
End: 2025-04-02
Attending: ORTHOPAEDIC SURGERY
Payer: MEDICARE

## 2025-04-02 DIAGNOSIS — M25.512 CHRONIC LEFT SHOULDER PAIN: Primary | ICD-10-CM

## 2025-04-02 DIAGNOSIS — G89.29 CHRONIC LEFT SHOULDER PAIN: Primary | ICD-10-CM

## 2025-04-02 PROCEDURE — G0463 HOSPITAL OUTPT CLINIC VISIT: HCPCS

## 2025-04-02 NOTE — PROGRESS NOTES
SUBJECTIVE:  Patient returns with regards to bilateral shoulder pain.  Patient reports left side is bothering more than the right side.  Patient's had a history of previous shoulder injections which are becoming less effective for him.  Patient still is very active in swimming like to continue to do so however the shoulder is causing him trouble when he is doing too much of that.  Patient is here to consider next steps and options at this time.  Patient denies any recent falls or injuries.  Patient did have x-rays done in [place] and I reviewed that shows evidence for arthrosis on both sides but the left side more pronounced than the right right side more pronounced than left at this time.    ROS: Musculoskeletal and general review of systems are negative, per review of previous clinic questionnaire.  Denies SOB and calf pain.    EXAM: Examination both shoulders show forward elevation bilaterally of about 160.  Abduction of about 160 as well.  Tenderness with palpation noted there as well.  Discomfort with internal rotation as well as external rotation beyond 5 degrees.  Neuroexam is otherwise intact.    IMAGING: X-rays of both shoulders reviewed moderate arthrosis present as far as the left shoulder is concerned and severe on the right side at this time there is some mild posterior glenoid wear more so on the right than the left.    ASSESSMENT: Bilateral shoulder joint underlying arthrosis possibility for cuff pathology on the left side.    PLAN: MRI left shoulder is been recommended to evaluate for the status of the rotator cuff and as well as surgical planning in regards to the shoulder.  Patient will be contacted once complete as if he does plan to move forward on intervention like shoulder replacement his left side will be done before his right.    Jeff Walton MD

## 2025-04-10 ENCOUNTER — TELEPHONE (OUTPATIENT)
Dept: ORTHOPEDICS | Facility: OTHER | Age: 80
End: 2025-04-10
Payer: MEDICARE

## 2025-04-10 NOTE — TELEPHONE ENCOUNTER
Jared called to cancel the MRI Shoulder Left w/o Contrast. He stated he did not want to  reschedule at this time and will call us when he is ready. Sending this message in case you want to reach out to the patient. We will defer the order until we hear from the patient.     Brissa Prado on 4/10/2025 at 3:04 PM

## 2025-04-15 ENCOUNTER — NURSE TRIAGE (OUTPATIENT)
Dept: FAMILY MEDICINE | Facility: OTHER | Age: 80
End: 2025-04-15

## 2025-04-15 ENCOUNTER — OFFICE VISIT (OUTPATIENT)
Dept: FAMILY MEDICINE | Facility: OTHER | Age: 80
End: 2025-04-15
Attending: FAMILY MEDICINE
Payer: MEDICARE

## 2025-04-15 VITALS
SYSTOLIC BLOOD PRESSURE: 120 MMHG | DIASTOLIC BLOOD PRESSURE: 78 MMHG | BODY MASS INDEX: 23.5 KG/M2 | HEART RATE: 66 BPM | OXYGEN SATURATION: 98 % | TEMPERATURE: 97.6 F | WEIGHT: 153.4 LBS | RESPIRATION RATE: 20 BRPM

## 2025-04-15 DIAGNOSIS — I74.3: ICD-10-CM

## 2025-04-15 DIAGNOSIS — R20.0 HAND NUMBNESS: Primary | ICD-10-CM

## 2025-04-15 PROCEDURE — G0463 HOSPITAL OUTPT CLINIC VISIT: HCPCS

## 2025-04-15 ASSESSMENT — ANXIETY QUESTIONNAIRES
7. FEELING AFRAID AS IF SOMETHING AWFUL MIGHT HAPPEN: SEVERAL DAYS
1. FEELING NERVOUS, ANXIOUS, OR ON EDGE: SEVERAL DAYS
GAD7 TOTAL SCORE: 6
5. BEING SO RESTLESS THAT IT IS HARD TO SIT STILL: NOT AT ALL
2. NOT BEING ABLE TO STOP OR CONTROL WORRYING: SEVERAL DAYS
IF YOU CHECKED OFF ANY PROBLEMS ON THIS QUESTIONNAIRE, HOW DIFFICULT HAVE THESE PROBLEMS MADE IT FOR YOU TO DO YOUR WORK, TAKE CARE OF THINGS AT HOME, OR GET ALONG WITH OTHER PEOPLE: SOMEWHAT DIFFICULT
GAD7 TOTAL SCORE: 6
8. IF YOU CHECKED OFF ANY PROBLEMS, HOW DIFFICULT HAVE THESE MADE IT FOR YOU TO DO YOUR WORK, TAKE CARE OF THINGS AT HOME, OR GET ALONG WITH OTHER PEOPLE?: SOMEWHAT DIFFICULT
3. WORRYING TOO MUCH ABOUT DIFFERENT THINGS: SEVERAL DAYS
4. TROUBLE RELAXING: SEVERAL DAYS
6. BECOMING EASILY ANNOYED OR IRRITABLE: SEVERAL DAYS

## 2025-04-15 ASSESSMENT — ASTHMA QUESTIONNAIRES
QUESTION_2 LAST FOUR WEEKS HOW OFTEN HAVE YOU HAD SHORTNESS OF BREATH: NOT AT ALL
QUESTION_5 LAST FOUR WEEKS HOW WOULD YOU RATE YOUR ASTHMA CONTROL: COMPLETELY CONTROLLED
QUESTION_3 LAST FOUR WEEKS HOW OFTEN DID YOUR ASTHMA SYMPTOMS (WHEEZING, COUGHING, SHORTNESS OF BREATH, CHEST TIGHTNESS OR PAIN) WAKE YOU UP AT NIGHT OR EARLIER THAN USUAL IN THE MORNING: NOT AT ALL
QUESTION_1 LAST FOUR WEEKS HOW MUCH OF THE TIME DID YOUR ASTHMA KEEP YOU FROM GETTING AS MUCH DONE AT WORK, SCHOOL OR AT HOME: NONE OF THE TIME
ACT_TOTALSCORE: 25
QUESTION_4 LAST FOUR WEEKS HOW OFTEN HAVE YOU USED YOUR RESCUE INHALER OR NEBULIZER MEDICATION (SUCH AS ALBUTEROL): NOT AT ALL

## 2025-04-15 ASSESSMENT — PAIN SCALES - GENERAL: PAINLEVEL_OUTOF10: MILD PAIN (1)

## 2025-04-15 NOTE — NURSING NOTE
Patient here for anxiety, depression  left arm pain  and numbness. He feels it is anxiety. He said the pain started 2-3 months ago. Medication Reconciliation: complete.    Alyeen Naranjo LPN  4/15/2025 11:01 AM

## 2025-04-15 NOTE — TELEPHONE ENCOUNTER
S-(situation): patient having numbness in left arm and dizziness    B-(background): patient states he has been seeing Dr. Walton for shoulder pain.  States 3-4 days ago he developed numbness in left arm.  States it comes and goes and the other day he had numbness in both arms.      A-(assessment): numbness in left arm this morning and having dizziness even with sitting.  Denies any other symptoms.  States he can hold his coffee cup.  States he is able to walk but is unsteady on his feet.      R-(recommendations): patient is scheduled with Dr. Grayson at 11 this morning.    Will route to Dr. Grayson for review and advise if patient okay to wait for appointment or to present to ER?    Cherri Flores RN on 4/15/2025 at 8:12 AM      Reason for Disposition   Neurologic deficit of gradual onset (e.g., days to weeks), ANY of the following: * Weakness of the face, arm, or leg on one side of the body* Numbness of the face, arm, or leg on one side of the body* Loss of speech or garbled speech    Additional Information   Negative: SEVERE weakness (e.g., unable to walk or barely able to walk, requires support) and new-onset or getting worse   Negative: Difficult to awaken or acting confused (e.g., disoriented, slurred speech)   Negative: Sudden onset of weakness of the face, arm or leg on one side of the body and present now (Exception: Bell's palsy suspected: weakness on one side of the face developing over hours to days, with no other symptoms.)   Negative: Sudden onset of numbness of the face, arm or leg on one side of the body and present now   Negative: Sudden onset of loss of speech or garbled speech and present now   Negative: Sounds like a life-threatening emergency to the triager   Negative: Dizziness is main symptom   Negative: Confusion, disorientation, or hallucinations is main symptom   Negative: Followed a head injury within last 3 days   Negative: Headache (with neurologic deficit)   Negative: Unable to urinate  "(or only a few drops) and bladder feels very full   Negative: Loss of bladder or bowel control (urine or bowel incontinence; wetting self, leaking stool) of new-onset   Negative: Back pain with numbness (loss of sensation) in groin or rectal area   Negative: Neurologic deficit that was brief (now gone), ANY of the following: * Weakness of the face, arm, or leg on one side of the body * Numbness of the face, arm, or leg on one side of the body * Loss of speech or garbled speech   Negative: Patient sounds very sick or weak to the triager    Answer Assessment - Initial Assessment Questions  1. SYMPTOM: \"What is the main symptom you are concerned about?\" (e.g., weakness, numbness)      Numbness in left arm, then other day both arms  2. ONSET: \"When did this start?\" (minutes, hours, days; while sleeping)      3-4 days off and on  3. LAST NORMAL: \"When was the last time you (the patient) were normal (no symptoms)?\"      3-4 days ago  4. PATTERN \"Does this come and go, or has it been constant since it started?\"  \"Is it present now?\"      Comes and goes  5. CARDIAC SYMPTOMS: \"Have you had any of the following symptoms: chest pain, difficulty breathing, palpitations?\"      denies  6. NEUROLOGIC SYMPTOMS: \"Have you had any of the following symptoms: headache, dizziness, vision loss, double vision, changes in speech, unsteady on your feet?\"      Dizziness, unsteady on your feet  7. OTHER SYMPTOMS: \"Do you have any other symptoms?\"      denies  8. PREGNANCY: \"Is there any chance you are pregnant?\" \"When was your last menstrual period?\"      N/A    Protocols used: Neurologic Deficit-A-OH    "

## 2025-04-15 NOTE — PROGRESS NOTES
SUBJECTIVE:   Jared Parry is a 79 year old male who presents to clinic today for the following health issues: Patient arrives here for follow-up of left arm numbness.  He reports arm numbness has resolved.  He feels it might be due to anxiety.  Started about 2 months to 3 months ago.  He has had a couple episodes.  1 episodes where his hand was clumsy for few minutes and then resolved.  Patient does not have a history of atrial fibrillation.  He had a Zio patch late last year which was negative for atrial FaBB.  But he does get heart palpitations.  He did report trouble gripping pulling with his left hand.  Seems to have these during anxiety episodes.  [facility].    History of Present Illness       Mental Health Follow-up:  Patient presents to follow-up on Anxiety.    Patient's anxiety since last visit has been:  No change  The patient is having other symptoms associated with anxiety.  Any significant life events: No  Patient is feeling anxious or having panic attacks.  Patient has no concerns about alcohol or drug use.    Reason for visit:  Numbness in left arm and dizziness  Symptom onset:  3-7 days ago  Symptoms include:  Numbness o\in left arm and dizziness  Symptom intensity:  Mild  Symptom progression:  Staying the same  Had these symptoms before:  Yes  Has tried/received treatment for these symptoms:  Yes  Previous treatment was successful:  Yes  Prior treatment description:  Counseling  What makes it worse:  Pain  What makes it better:  Swimming   He is taking medications regularly.        Patient Active Problem List    Diagnosis Date Noted    Palpitations 09/11/2024     Priority: Medium    Tubular adenoma of colon 10/11/2023     Priority: Medium    Primary hypertension 05/17/2023     Priority: Medium    Sigmoid diverticulosis 06/27/2022     Priority: Medium    Other insomnia 09/07/2021     Priority: Medium    JALYN (generalized anxiety disorder) 01/08/2020     Priority:  Medium    Mild intermittent asthma without complication 2019     Priority: Medium    History of tobacco use disorder 2019     Priority: Medium    Atherosclerosis of native artery of extremity with intermittent claudication 06/15/2018     Priority: Medium    Esophageal reflux 2018     Priority: Medium    Hyperlipidemia, mild 2018     Priority: Medium    Nephrolithiasis 2018     Priority: Medium    DUNIA on CPAP 2018     Priority: Medium    Popliteal artery embolism, left (H) 2017     Priority: Medium     Past Medical History:   Diagnosis Date    Attention-deficit hyperactivity disorder     No Comments Provided    Benign neoplasm of colon     Next colonoscopy due     Calculus of kidney     No Comments Provided    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    On plavix per vascular surgeon    Enlarged prostate without lower urinary tract symptoms (luts)     No Comments Provided    Essential (primary) hypertension     No Comments Provided    GERD (gastroesophageal reflux disease)     Hyperlipidemia     No Comments Provided    DUNIA on CPAP       Past Surgical History:   Procedure Laterality Date    APPENDECTOMY OPEN      No Comments Provided    ARTHROSCOPY KNEE Left     AS REMOVAL OF KIDNEY STONE      COLONOSCOPY  2017    Adenomatous, f/u in     COLONOSCOPY N/A 2022    F/U  if applicable tubular adenoma, diverticulosis    CYSTOSCOPY, TRANSURETHRAL RESECTION (TUR) PROSTATE, COMBINED      No Comments Provided    EMBOLECTOMY LOWER EXTREMITY  2017    Popliteal    HERNIORRHAPHY INGUINAL BILATERAL      [place] in     OPEN REDUCTION INTERNAL FIXATION FEMUR DISTAL Left      Family History   Problem Relation Age of Onset    Diabetes Mother     Heart Disease Mother     Heart Disease Father          at age 56 of an MI    Hypertension Father     Hyperlipidemia Father     Substance Abuse Father         alcohol use    Other - See Comments  Sister          in her mid 60's.  Viral cardiomyopathy,    Diabetes Sister     Arthritis Sister     Hypertension Sister     Hyperlipidemia Sister     Hypertension Brother     Heart Disease Brother         Heart Disease,CAD starting in his late 50s    Hyperlipidemia Brother     Other - See Comments Brother         obesity    Diabetes Brother     Hypertension Brother     Heart Disease Brother     Hyperlipidemia Brother     Other - See Comments Brother         obesity    Diabetes Brother     Alzheimer Disease Brother     Hypertension Brother     Heart Disease Brother     Prostate Cancer Brother        Review of Systems     OBJECTIVE:     /78   Pulse 66   Temp 97.6  F (36.4  C)   Resp 20   Wt 69.6 kg (153 lb 6.4 oz)   SpO2 98%   BMI 23.50 kg/m    Body mass index is 23.5 kg/m .  Physical Exam  Constitutional:       Appearance: Normal appearance.   Musculoskeletal:         General: Normal range of motion.      Comments: Good hand grasp.  Good strength in the upper extremities flexion extension at the elbow good range of motion.  No hand weakness   Skin:     General: Skin is warm.   Neurological:      General: No focal deficit present.      Mental Status: He is alert.   Psychiatric:         Thought Content: Thought content normal.         Diagnostic Test Results:  none     ASSESSMENT/PLAN:         (R20.0) Hand numbness  (primary encounter diagnosis)  Comment:   Plan: US Carotid Bilateral  Discussed with the patient possibly anxiety related.  He seems to think it is but cannot rule out a TIA.  I discussed MRI.  Patient would like to wait discussed ultrasound the carotids and is agreeable to pursue this.  Will get back to him with the carotid ultrasounds return.       Patient is also advised if this should occur again he needs to follow-up in the ER stat.  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  The longitudinal plan of care for the diagnosis(es)/condition(s) as documented  were addressed during this visit. Due to the added complexity in care, I will continue to support Jared in the subsequent management and with ongoing continuity of care.      Robert Grayson MD  [facility]

## 2025-04-29 ENCOUNTER — HOSPITAL ENCOUNTER (OUTPATIENT)
Dept: MRI IMAGING | Facility: OTHER | Age: 80
Discharge: HOME | End: 2025-04-29
Attending: ORTHOPAEDIC SURGERY | Admitting: STUDENT IN AN ORGANIZED HEALTH CARE EDUCATION/TRAINING PROGRAM
Payer: MEDICARE

## 2025-04-29 DIAGNOSIS — M25.512 CHRONIC LEFT SHOULDER PAIN: ICD-10-CM

## 2025-04-29 DIAGNOSIS — G89.29 CHRONIC LEFT SHOULDER PAIN: ICD-10-CM

## 2025-04-29 PROCEDURE — 73221 MRI JOINT UPR EXTREM W/O DYE: CPT | Mod: LT

## 2025-04-29 PROCEDURE — 73221 MRI JOINT UPR EXTREM W/O DYE: CPT | Mod: 26 | Performed by: STUDENT IN AN ORGANIZED HEALTH CARE EDUCATION/TRAINING PROGRAM

## 2025-05-02 ENCOUNTER — HOSPITAL ENCOUNTER (OUTPATIENT)
Dept: ULTRASOUND IMAGING | Facility: OTHER | Age: 80
Discharge: HOME | End: 2025-05-02
Attending: FAMILY MEDICINE | Admitting: RADIOLOGY
Payer: MEDICARE

## 2025-05-02 DIAGNOSIS — R20.0 HAND NUMBNESS: ICD-10-CM

## 2025-05-02 PROCEDURE — 93880 EXTRACRANIAL BILAT STUDY: CPT

## 2025-05-02 PROCEDURE — 93880 EXTRACRANIAL BILAT STUDY: CPT | Mod: 26 | Performed by: RADIOLOGY

## 2025-06-04 ENCOUNTER — OFFICE VISIT (OUTPATIENT)
Dept: ORTHOPEDICS | Facility: OTHER | Age: 80
End: 2025-06-04
Attending: ORTHOPAEDIC SURGERY
Payer: MEDICARE

## 2025-06-04 DIAGNOSIS — M25.511 CHRONIC RIGHT SHOULDER PAIN: ICD-10-CM

## 2025-06-04 DIAGNOSIS — G89.29 CHRONIC LEFT SHOULDER PAIN: Primary | ICD-10-CM

## 2025-06-04 DIAGNOSIS — G89.29 CHRONIC RIGHT SHOULDER PAIN: ICD-10-CM

## 2025-06-04 DIAGNOSIS — M25.512 CHRONIC LEFT SHOULDER PAIN: Primary | ICD-10-CM

## 2025-06-04 PROCEDURE — G0463 HOSPITAL OUTPT CLINIC VISIT: HCPCS

## 2025-06-04 NOTE — PROGRESS NOTES
SUBJECTIVE:  Patient returns with regards to bilateral shoulder pain.  Patient did have MRI scan done on his left side shows small rotator cuff tear plus underlying arthrosis.  Patient has been working on some therapies did have an injection and that seems to have helped out things at this time.  Patient is here to explore long-term options as a pertains to his shoulders.  Patient is otherwise maintained good activity levels here as well.    ROS: Musculoskeletal and general review of systems are negative, per review of previous clinic questionnaire.  Denies SOB and calf pain.    EXAM: Bilateral shoulder examination shows forward elevation about 170.  Abduction 170.  Internal Tatian well past hip level.  Strength appears to be maintained very nicely.  Patient does have crepitation with range of motion as well.    IMAGING: Prior x-rays and MRI reviewed findings consistent with rotator cuff arthropathy small cuff cuff tear more so on the left side than the right.    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  We will proceed forward with injection when he needs a booster for that.  Long-term manage regards to both shoulders are given before replacement and likely reverse for him as well.  At this current time however his function is doing well and his pain levels are certainly at adequate place as well.    Jeff Walton MD

## 2025-06-05 ENCOUNTER — OFFICE VISIT (OUTPATIENT)
Dept: FAMILY MEDICINE | Facility: OTHER | Age: 80
End: 2025-06-05
Attending: FAMILY MEDICINE
Payer: MEDICARE

## 2025-06-05 VITALS
HEIGHT: 68 IN | TEMPERATURE: 97 F | RESPIRATION RATE: 20 BRPM | WEIGHT: 155.8 LBS | SYSTOLIC BLOOD PRESSURE: 151 MMHG | HEART RATE: 64 BPM | BODY MASS INDEX: 23.61 KG/M2 | OXYGEN SATURATION: 98 % | DIASTOLIC BLOOD PRESSURE: 78 MMHG

## 2025-06-05 DIAGNOSIS — I10 PRIMARY HYPERTENSION: Primary | ICD-10-CM

## 2025-06-05 LAB
ANION GAP SERPL CALCULATED.3IONS-SCNC: 12 MMOL/L (ref 7–15)
BUN SERPL-MCNC: 21.9 MG/DL (ref 8–23)
CALCIUM SERPL-MCNC: 9.4 MG/DL (ref 8.8–10.4)
CHLORIDE SERPL-SCNC: 101 MMOL/L (ref 98–107)
CREAT SERPL-MCNC: 0.68 MG/DL (ref 0.67–1.17)
EGFRCR SERPLBLD CKD-EPI 2021: >90 ML/MIN/1.73M2
GLUCOSE SERPL-MCNC: 100 MG/DL (ref 70–99)
HCO3 SERPL-SCNC: 25 MMOL/L (ref 22–29)
POTASSIUM SERPL-SCNC: 4.3 MMOL/L (ref 3.4–5.3)
SODIUM SERPL-SCNC: 138 MMOL/L (ref 135–145)

## 2025-06-05 PROCEDURE — 80048 BASIC METABOLIC PNL TOTAL CA: CPT | Mod: ZL | Performed by: FAMILY MEDICINE

## 2025-06-05 PROCEDURE — 36415 COLL VENOUS BLD VENIPUNCTURE: CPT | Mod: ZL | Performed by: FAMILY MEDICINE

## 2025-06-05 PROCEDURE — G0463 HOSPITAL OUTPT CLINIC VISIT: HCPCS

## 2025-06-05 RX ORDER — CHLORTHALIDONE 25 MG/1
25 TABLET ORAL DAILY
Qty: 90 TABLET | Refills: 3 | Status: SHIPPED | OUTPATIENT
Start: 2025-06-05

## 2025-06-05 ASSESSMENT — PAIN SCALES - GENERAL: PAINLEVEL_OUTOF10: NO PAIN (0)

## 2025-06-05 NOTE — PROGRESS NOTES
"  Assessment & Plan     Primary hypertension  Medication.  Versus venous incompetency.  Doubt CHF.  Will start [place] for his blood pressure.  Continue blood pressures at home.  Follow-up if his blood pressure is not under good control in 1 month.  Otherwise drop his flowsheet off.  BMP drawn.  - chlorthalidone (HYGROTON) 25 MG tablet; Take 1 tablet (25 mg) by mouth daily.  - Basic Metabolic Panel; Future  - Basic Metabolic Panel        The longitudinal plan of care for the diagnosis(es)/condition(s) as documented were addressed during this visit. Due to the added complexity in care, I will continue to support Jared in the subsequent management and with ongoing continuity of care.        Subjective   Jared is a 79 year old, presenting for the following health issues:  Follow up (Swelling in lower legs)      6/5/2025     3:18 PM   Additional Questions   Roomed by Sania   Accompanied by Self     History of Present Illness       Reason for visit:  Swelling in legs.  Symptoms include:  Swelling in legs.  Symptom intensity:  Mild  Symptom progression:  Worsening  Had these symptoms before:  Yes  Has tried/received treatment for these symptoms:  No  What makes it worse:  Standing  What makes it better:  Rest   He is taking medications regularly.            Patient reports leg swelling is chronic.  Seems to started about 2 years ago when he started Norvasc.  In his water pill was taken away.  No shortness of breath.  No PND and orthopnea.  Patient does report the swelling does improve in the morning worsens during the day.          Objective    BP (!) 151/78 (BP Location: Right arm, Patient Position: Sitting, Cuff Size: Adult Regular)   Pulse 64   Temp 97  F (36.1  C) (Temporal)   Resp 20   Ht 1.727 m (5' 8\")   Wt 70.7 kg (155 lb 12.8 oz)   SpO2 98%   BMI 23.69 kg/m    Body mass index is 23.69 kg/m .  Physical Exam   GENERAL: alert and no distress  NECK: no adenopathy, no asymmetry, masses, or scars  RESP: " lungs clear to auscultation - no rales, rhonchi or wheezes  CV: regular rate and rhythm, normal S1 S2, no S3 or S4, no murmur, click or rub, no peripheral edema  ABDOMEN: soft, nontender, no hepatosplenomegaly, no masses and bowel sounds normal  MS: no gross musculoskeletal defects noted, no edema            Signed Electronically by: Robert Grayson MD

## 2025-06-05 NOTE — NURSING NOTE
"Chief Complaint   Patient presents with    Follow up     Swelling in lower legs       Initial BP (!) 151/78 (BP Location: Right arm, Patient Position: Sitting, Cuff Size: Adult Regular)   Pulse 64   Temp 97  F (36.1  C) (Temporal)   Resp 20   Ht 1.727 m (5' 8\")   Wt 70.7 kg (155 lb 12.8 oz)   SpO2 98%   BMI 23.69 kg/m   Estimated body mass index is 23.69 kg/m  as calculated from the following:    Height as of this encounter: 1.727 m (5' 8\").    Weight as of this encounter: 70.7 kg (155 lb 12.8 oz).  Medication Review: complete    The next two questions are to help us understand your food security.  If you are feeling you need any assistance in this area, we have resources available to support you today.          6/5/2025   SDOH- Food Insecurity   Within the past 12 months, did you worry that your food would run out before you got money to buy more? N   Within the past 12 months, did the food you bought just not last and you didn t have money to get more? N         Health Care Directive:  Patient has a Health Care Directive on file      Sania Bustos      "

## 2025-06-07 ENCOUNTER — RESULTS FOLLOW-UP (OUTPATIENT)
Dept: FAMILY MEDICINE | Facility: OTHER | Age: 80
End: 2025-06-07

## 2025-06-20 ENCOUNTER — TELEPHONE (OUTPATIENT)
Dept: FAMILY MEDICINE | Facility: OTHER | Age: 80
End: 2025-06-20
Payer: MEDICARE

## 2025-06-20 NOTE — TELEPHONE ENCOUNTER
Patient was prescribed chlorthalidone and its upsetting pt stomach, pt wondering if he can try something different.    Ok to leave detailed message    Olamide Flores on 6/20/2025 at 9:18 AM

## 2025-07-10 ENCOUNTER — MEDICAL CORRESPONDENCE (OUTPATIENT)
Dept: HEALTH INFORMATION MANAGEMENT | Facility: OTHER | Age: 80
End: 2025-07-10
Payer: MEDICARE

## 2025-07-22 DIAGNOSIS — M25.551 PAIN OF RIGHT HIP: Primary | ICD-10-CM

## 2025-07-23 ENCOUNTER — HOSPITAL ENCOUNTER (OUTPATIENT)
Dept: GENERAL RADIOLOGY | Facility: OTHER | Age: 80
Discharge: HOME | End: 2025-07-23
Attending: ORTHOPAEDIC SURGERY
Payer: MEDICARE

## 2025-07-23 ENCOUNTER — OFFICE VISIT (OUTPATIENT)
Dept: ORTHOPEDICS | Facility: OTHER | Age: 80
End: 2025-07-23
Attending: ORTHOPAEDIC SURGERY
Payer: MEDICARE

## 2025-07-23 VITALS
HEIGHT: 67 IN | SYSTOLIC BLOOD PRESSURE: 136 MMHG | RESPIRATION RATE: 20 BRPM | WEIGHT: 150 LBS | DIASTOLIC BLOOD PRESSURE: 68 MMHG | BODY MASS INDEX: 23.54 KG/M2

## 2025-07-23 DIAGNOSIS — M25.551 PAIN OF RIGHT HIP: Primary | ICD-10-CM

## 2025-07-23 DIAGNOSIS — M25.551 PAIN OF RIGHT HIP: ICD-10-CM

## 2025-07-23 PROCEDURE — 250N000011 HC RX IP 250 OP 636: Mod: JZ | Performed by: ORTHOPAEDIC SURGERY

## 2025-07-23 PROCEDURE — 73502 X-RAY EXAM HIP UNI 2-3 VIEWS: CPT

## 2025-07-23 PROCEDURE — G0463 HOSPITAL OUTPT CLINIC VISIT: HCPCS

## 2025-07-23 PROCEDURE — 250N000009 HC RX 250: Performed by: ORTHOPAEDIC SURGERY

## 2025-07-23 RX ORDER — TRIAMCINOLONE ACETONIDE 40 MG/ML
40 INJECTION, SUSPENSION INTRA-ARTICULAR; INTRAMUSCULAR ONCE
Status: COMPLETED | OUTPATIENT
Start: 2025-07-23 | End: 2025-07-23

## 2025-07-23 RX ORDER — LIDOCAINE HYDROCHLORIDE 10 MG/ML
4 INJECTION, SOLUTION INFILTRATION; PERINEURAL ONCE
Status: COMPLETED | OUTPATIENT
Start: 2025-07-23 | End: 2025-07-23

## 2025-07-23 RX ADMIN — TRIAMCINOLONE ACETONIDE 40 MG: 40 INJECTION, SUSPENSION INTRA-ARTICULAR; INTRAMUSCULAR at 14:05

## 2025-07-23 RX ADMIN — LIDOCAINE HYDROCHLORIDE 4 ML: 10 INJECTION, SOLUTION INFILTRATION; PERINEURAL at 14:04

## 2025-07-23 ASSESSMENT — PAIN SCALES - GENERAL: PAINLEVEL_OUTOF10: MODERATE PAIN (6)

## 2025-07-23 NOTE — PROGRESS NOTES
Surgical Clinic Consult  Primary physician:     Robert Grayson    Chief complaint:   Right hip pain    History of present illness:  This is a 79 year old male I am seeing in consultation for right hip pain has been ongoing here for the last several months.  No history of prior surgery no prior injections done.  Patient reports pain along the lateral side.  Patient does have some wear-and-tear present within the lower back in addition to that.  Patient denies any radiating pain but it seems to have potentially a slight bit of S1 nerve root based irritation.    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    On plavix per vascular surgeon    Enlarged prostate without lower urinary tract symptoms (luts)     No Comments Provided    Essential (primary) hypertension     No Comments Provided    GERD (gastroesophageal reflux disease)     Hyperlipidemia     No Comments Provided    DUNIA on CPAP        Pastsurgical history:  Past Surgical History:   Procedure Laterality Date    APPENDECTOMY OPEN      No Comments Provided    ARTHROSCOPY KNEE Left     AS REMOVAL OF KIDNEY STONE  2016    COLONOSCOPY  01/25/2017    Adenomatous, f/u in 2022    COLONOSCOPY N/A 06/27/2022    F/U 2027 if applicable tubular adenoma, diverticulosis    CYSTOSCOPY, TRANSURETHRAL RESECTION (TUR) PROSTATE, COMBINED      No Comments Provided    EMBOLECTOMY LOWER EXTREMITY  2017    Popliteal    HERNIORRHAPHY INGUINAL BILATERAL      [place] in 2021    OPEN REDUCTION INTERNAL FIXATION FEMUR DISTAL Left        Current medications:  Current Outpatient Medications   Medication Sig Dispense Refill    amLODIPine (NORVASC) 5 MG tablet Take 1 tablet (5 mg) by mouth daily. 90 tablet 4    atorvastatin (LIPITOR) 40 MG  "tablet Take 1 tablet (40 mg) by mouth daily. 100 tablet 4    clopidogrel (PLAVIX) 75 MG tablet Take 1 tablet (75 mg) by mouth daily. 90 tablet 4    cycloSPORINE (RESTASIS) 0.05 % ophthalmic emulsion Place 1 drop into both eyes 2 times daily      famotidine (PEPCID) 10 MG tablet Take 1 tablet (10 mg) by mouth daily. 90 tablet 4    hydroCHLOROthiazide 12.5 MG tablet Take 1 tablet (12.5 mg) by mouth daily. 90 tablet 4    Lactobacillus (PROBIOTIC ACIDOPHILUS) TABS Take 1 tablet by mouth daily       losartan (COZAAR) 100 MG tablet Take 1 tablet (100 mg) by mouth daily. 90 tablet 4    montelukast (SINGULAIR) 10 MG tablet Take 1 tablet (10 mg) by mouth at bedtime. 90 tablet 3    triamcinolone (NASACORT) 55 MCG/ACT nasal aerosol Spray 2 sprays into both nostrils daily      zolpidem (AMBIEN) 5 MG tablet Take 1 tablet (5 mg) by mouth nightly as needed for sleep. 30 tablet 0       Allergies:  Allergies   Allergen Reactions    Thiopental      Other reaction(s): *Unknown - Childhood Rxn  Patient states \"almost  from Pentothal as child\"    Cephalexin Hives    Chlorthalidone Nausea    Dust Mites     Trazodone Difficulty breathing    Ciprofloxacin Hives       Family history:  Family History   Problem Relation Age of Onset    Diabetes Mother     Heart Disease Mother     Heart Disease Father          at age 56 of an MI    Hypertension Father     Hyperlipidemia Father     Substance Abuse Father         alcohol use    Other - See Comments Sister          in her mid 60's.  Viral cardiomyopathy,    Diabetes Sister     Arthritis Sister     Hypertension Sister     Hyperlipidemia Sister     Hypertension Brother     Heart Disease Brother         Heart Disease,CAD starting in his late 50s    Hyperlipidemia Brother     Other - See Comments Brother         obesity    Diabetes Brother     Hypertension Brother     Heart Disease Brother     Hyperlipidemia Brother     Other - See Comments Brother         obesity    Diabetes " Brother     Alzheimer Disease Brother     Hypertension Brother     Heart Disease Brother     Prostate Cancer Brother        Social history:  Social History     Socioeconomic History    Marital status:      Spouse name: Not on file    Number of children: Not on file    Years of education: Not on file    Highest education level: Not on file   Occupational History    Not on file   Tobacco Use    Smoking status: Former     Current packs/day: 0.00     Average packs/day: 1 pack/day for 30.0 years (30.0 ttl pk-yrs)     Types: Cigarettes     Start date: 1953     Quit date: 1983     Years since quittin.8    Smokeless tobacco: Former     Quit date: 1986   Vaping Use    Vaping status: Never Used   Substance and Sexual Activity    Alcohol use: No     Alcohol/week: 0.0 standard drinks of alcohol    Drug use: Never    Sexual activity: Yes     Partners: Female   Other Topics Concern    Not on file   Social History Narrative     with two daughters from a previous marriage and five grandchildren.  Retired as a .  Lives with his wife on [address] with wife Samantha in [place].  [place] in [place].     Social Drivers of Health     Financial Resource Strain: Low Risk  (10/6/2023)    Financial Resource Strain     Within the past 12 months, have you or your family members you live with been unable to get utilities (heat, electricity) when it was really needed?: No   Food Insecurity: Low Risk  (2025)    Food Insecurity     Within the past 12 months, did you worry that your food would run out before you got money to buy more?: No     Within the past 12 months, did the food you bought just not last and you didn t have money to get more?: No   Transportation Needs: Low Risk  (10/6/2023)    Transportation Needs     Within the past 12 months, has lack of transportation kept you from medical appointments, getting your medicines, non-medical meetings or appointments, work, or from  "getting things that you need?: No   Physical Activity: Not on file   Stress: Not on file   Social Connections: Not on file   Interpersonal Safety: Low Risk  (6/5/2025)    Interpersonal Safety     Do you feel physically and emotionally safe where you currently live?: Yes     Within the past 12 months, have you been hit, slapped, kicked or otherwise physically hurt by someone?: No     Within the past 12 months, have you been humiliated or emotionally abused in other ways by your partner or ex-partner?: No   Housing Stability: Low Risk  (10/6/2023)    Housing Stability     Do you have housing? : Yes     Are you worried about losing your housing?: No       PROBLEM LIST:  Patient Active Problem List   Diagnosis    Esophageal reflux    Hyperlipidemia, mild    Nephrolithiasis    DUNIA on CPAP    Popliteal artery embolism, left (H)    Atherosclerosis of native artery of extremity with intermittent claudication    History of tobacco use disorder    Mild intermittent asthma without complication    JALYN (generalized anxiety disorder)    Other insomnia    Sigmoid diverticulosis    Primary hypertension    Tubular adenoma of colon    Palpitations       Review of Systems:  COMPLETE 12 point REVIEW OF SYSTEMS is otherwise negative with the exception of which is stated above.    Physical exam: /68   Resp 20   Ht 1.702 m (5' 7\")   Wt 68 kg (150 lb)   BMI 23.49 kg/m      General: this is a pleasant male patient in no acute distress.  Patient is awake alert and oriented x3 .   EXAM:  Chest/Respiratory Exam: Normal - Clear to auscultation without rales, rhonchi, or wheezing.  Cardiovascular Exam: normal  Musculoskeletal: Right hip examination shows hip flexion to 100 degrees.  Internal and external rotation to 20 and 20.  Tenderness across the bursa.  Hip abduction strength testing is 5 out of 5.  LING testing is otherwise negative.    PROCEDURE NOTE: Right hip bursa is injected with 4 cc 1% lidocaine and 40 mg of Kenalog under " sterile conditions.    Imagin views right hip reviewed shows evidence for no significant arthrosis    Assessment:   Right hip bursitis with possibility of lumbar stenosis activation as well    Plan:    Right hip bursal injection today.  Continue stretching regimen.  Repeat injection in the future as noted.  If incomplete resolution noted I would recommend he does consider MRI lumbar spine as this certainly could be a contributing factor.      Jeff Walton MD

## 2025-08-18 ENCOUNTER — TELEPHONE (OUTPATIENT)
Dept: FAMILY MEDICINE | Facility: OTHER | Age: 80
End: 2025-08-18
Payer: MEDICARE

## 2025-08-18 DIAGNOSIS — I10 PRIMARY HYPERTENSION: Primary | ICD-10-CM

## 2025-08-19 RX ORDER — CHLORTHALIDONE 25 MG/1
25 TABLET ORAL DAILY
Qty: 30 TABLET | Refills: 0 | Status: SHIPPED | OUTPATIENT
Start: 2025-08-19

## (undated) DEVICE — SUCTION MANIFOLD NEPTUNE 2 SYS 4 PORT 0702-020-000

## (undated) DEVICE — ENDO TRAP POLYP E-TRAP 00711099

## (undated) DEVICE — SOL WATER 1500ML

## (undated) DEVICE — ENDO BRUSH CHANNEL MASTER CLEANING 2-4.2MM BW-412T

## (undated) DEVICE — ENDO KIT COMPLIANCE DYKENDOCMPLY

## (undated) DEVICE — TUBING SUCTION 10'X3/16" N510

## (undated) DEVICE — ESU GROUND PAD ADULT W/CORD E7507

## (undated) DEVICE — ESU ENDO FORCEP BX HOT FD-210U

## (undated) RX ORDER — LIDOCAINE HYDROCHLORIDE 10 MG/ML
INJECTION, SOLUTION EPIDURAL; INFILTRATION; INTRACAUDAL; PERINEURAL
Status: DISPENSED
Start: 2020-07-15

## (undated) RX ORDER — AMLODIPINE BESYLATE 5 MG/1
TABLET ORAL
Status: DISPENSED
Start: 2023-06-12

## (undated) RX ORDER — TRIAMCINOLONE ACETONIDE 40 MG/ML
INJECTION, SUSPENSION INTRA-ARTICULAR; INTRAMUSCULAR
Status: DISPENSED
Start: 2024-05-22

## (undated) RX ORDER — MAGNESIUM HYDROXIDE/ALUMINUM HYDROXICE/SIMETHICONE 120; 1200; 1200 MG/30ML; MG/30ML; MG/30ML
SUSPENSION ORAL
Status: DISPENSED
Start: 2023-06-12

## (undated) RX ORDER — TRIAMCINOLONE ACETONIDE 40 MG/ML
INJECTION, SUSPENSION INTRA-ARTICULAR; INTRAMUSCULAR
Status: DISPENSED
Start: 2021-03-09

## (undated) RX ORDER — LIDOCAINE HYDROCHLORIDE 10 MG/ML
INJECTION, SOLUTION INFILTRATION; PERINEURAL
Status: DISPENSED
Start: 2025-07-23

## (undated) RX ORDER — PROPOFOL 10 MG/ML
INJECTION, EMULSION INTRAVENOUS
Status: DISPENSED
Start: 2022-06-27

## (undated) RX ORDER — LIDOCAINE HYDROCHLORIDE 10 MG/ML
INJECTION, SOLUTION INFILTRATION; PERINEURAL
Status: DISPENSED
Start: 2023-08-09

## (undated) RX ORDER — TRIAMCINOLONE ACETONIDE 40 MG/ML
INJECTION, SUSPENSION INTRA-ARTICULAR; INTRAMUSCULAR
Status: DISPENSED
Start: 2023-05-17

## (undated) RX ORDER — SODIUM CHLORIDE 9 MG/ML
INJECTION, SOLUTION INTRAVENOUS
Status: DISPENSED
Start: 2021-07-02

## (undated) RX ORDER — TRIAMCINOLONE ACETONIDE 40 MG/ML
INJECTION, SUSPENSION INTRA-ARTICULAR; INTRAMUSCULAR
Status: DISPENSED
Start: 2024-09-04

## (undated) RX ORDER — TRIAMCINOLONE ACETONIDE 40 MG/ML
INJECTION, SUSPENSION INTRA-ARTICULAR; INTRAMUSCULAR
Status: DISPENSED
Start: 2025-07-23

## (undated) RX ORDER — CEFTRIAXONE SODIUM 1 G/50ML
INJECTION, SOLUTION INTRAVENOUS
Status: DISPENSED
Start: 2021-07-02

## (undated) RX ORDER — HEPARIN SODIUM 10000 [USP'U]/100ML
INJECTION, SOLUTION INTRAVENOUS
Status: DISPENSED
Start: 2023-06-04

## (undated) RX ORDER — LIDOCAINE HYDROCHLORIDE 10 MG/ML
INJECTION, SOLUTION EPIDURAL; INFILTRATION; INTRACAUDAL; PERINEURAL
Status: DISPENSED
Start: 2021-06-22

## (undated) RX ORDER — ASPIRIN 81 MG/1
TABLET, CHEWABLE ORAL
Status: DISPENSED
Start: 2023-06-04

## (undated) RX ORDER — TRIAMCINOLONE ACETONIDE 40 MG/ML
INJECTION, SUSPENSION INTRA-ARTICULAR; INTRAMUSCULAR
Status: DISPENSED
Start: 2021-06-22

## (undated) RX ORDER — DIPHENHYDRAMINE HYDROCHLORIDE 50 MG/ML
INJECTION INTRAMUSCULAR; INTRAVENOUS
Status: DISPENSED
Start: 2021-07-02

## (undated) RX ORDER — LIDOCAINE HYDROCHLORIDE 10 MG/ML
INJECTION, SOLUTION INFILTRATION; PERINEURAL
Status: DISPENSED
Start: 2023-05-17

## (undated) RX ORDER — LIDOCAINE HYDROCHLORIDE 10 MG/ML
INJECTION, SOLUTION INFILTRATION; PERINEURAL
Status: DISPENSED
Start: 2024-05-22

## (undated) RX ORDER — LIDOCAINE HYDROCHLORIDE 20 MG/ML
INJECTION, SOLUTION EPIDURAL; INFILTRATION; INTRACAUDAL; PERINEURAL
Status: DISPENSED
Start: 2022-06-27

## (undated) RX ORDER — TRIAMCINOLONE ACETONIDE 40 MG/ML
INJECTION, SUSPENSION INTRA-ARTICULAR; INTRAMUSCULAR
Status: DISPENSED
Start: 2020-07-15

## (undated) RX ORDER — ACETAMINOPHEN 500 MG
TABLET ORAL
Status: DISPENSED
Start: 2021-07-02

## (undated) RX ORDER — TRIAMCINOLONE ACETONIDE 40 MG/ML
INJECTION, SUSPENSION INTRA-ARTICULAR; INTRAMUSCULAR
Status: DISPENSED
Start: 2023-08-09

## (undated) RX ORDER — LIDOCAINE HYDROCHLORIDE 10 MG/ML
INJECTION, SOLUTION INFILTRATION; PERINEURAL
Status: DISPENSED
Start: 2024-09-04

## (undated) RX ORDER — LIDOCAINE HYDROCHLORIDE 10 MG/ML
INJECTION, SOLUTION EPIDURAL; INFILTRATION; INTRACAUDAL; PERINEURAL
Status: DISPENSED
Start: 2021-03-09